# Patient Record
Sex: MALE | Race: WHITE | NOT HISPANIC OR LATINO | Employment: OTHER | URBAN - METROPOLITAN AREA
[De-identification: names, ages, dates, MRNs, and addresses within clinical notes are randomized per-mention and may not be internally consistent; named-entity substitution may affect disease eponyms.]

---

## 2024-01-01 ENCOUNTER — HOSPITAL ENCOUNTER (INPATIENT)
Dept: OTHER | Facility: HOSPITAL | Age: 79
Discharge: HOME | DRG: 871 | End: 2024-10-28
Payer: MEDICARE

## 2024-01-01 ENCOUNTER — APPOINTMENT (OUTPATIENT)
Dept: GENERAL RADIOLOGY | Facility: HOSPITAL | Age: 79
DRG: 871 | End: 2024-01-01
Payer: MEDICARE

## 2024-01-01 ENCOUNTER — APPOINTMENT (OUTPATIENT)
Dept: CARDIOLOGY | Facility: HOSPITAL | Age: 79
DRG: 871 | End: 2024-01-01
Payer: MEDICARE

## 2024-01-01 ENCOUNTER — APPOINTMENT (OUTPATIENT)
Dept: NEPHROLOGY | Facility: HOSPITAL | Age: 79
DRG: 871 | End: 2024-01-01
Payer: MEDICARE

## 2024-01-01 ENCOUNTER — HOSPITAL ENCOUNTER (INPATIENT)
Facility: HOSPITAL | Age: 79
LOS: 7 days | DRG: 871 | End: 2024-11-04
Attending: STUDENT IN AN ORGANIZED HEALTH CARE EDUCATION/TRAINING PROGRAM | Admitting: INTERNAL MEDICINE
Payer: MEDICARE

## 2024-01-01 VITALS
HEIGHT: 74 IN | WEIGHT: 246.91 LBS | BODY MASS INDEX: 31.69 KG/M2 | RESPIRATION RATE: 24 BRPM | TEMPERATURE: 99.5 F | OXYGEN SATURATION: 89 % | SYSTOLIC BLOOD PRESSURE: 78 MMHG | HEART RATE: 75 BPM | DIASTOLIC BLOOD PRESSURE: 40 MMHG

## 2024-01-01 DIAGNOSIS — R13.12 DYSPHAGIA, OROPHARYNGEAL: Primary | ICD-10-CM

## 2024-01-01 LAB
ALBUMIN SERPL-MCNC: 2.5 G/DL (ref 3.5–5.2)
ALBUMIN SERPL-MCNC: 2.5 G/DL (ref 3.5–5.2)
ALBUMIN SERPL-MCNC: 2.6 G/DL (ref 3.5–5.2)
ALBUMIN SERPL-MCNC: 2.7 G/DL (ref 3.5–5.2)
ALBUMIN SERPL-MCNC: 2.8 G/DL (ref 3.5–5.2)
ALBUMIN SERPL-MCNC: 3.1 G/DL (ref 3.5–5.2)
ALBUMIN/GLOB SERPL: 0.6 G/DL
ALBUMIN/GLOB SERPL: 0.7 G/DL
ALP SERPL-CCNC: 197 U/L (ref 39–117)
ALP SERPL-CCNC: 65 U/L (ref 39–117)
ALP SERPL-CCNC: 68 U/L (ref 39–117)
ALP SERPL-CCNC: 87 U/L (ref 39–117)
ALT SERPL W P-5'-P-CCNC: 10 U/L (ref 1–41)
ALT SERPL W P-5'-P-CCNC: 11 U/L (ref 1–41)
ALT SERPL W P-5'-P-CCNC: 12 U/L (ref 1–41)
ALT SERPL W P-5'-P-CCNC: 15 U/L (ref 1–41)
AMMONIA BLD-SCNC: 20 UMOL/L (ref 16–60)
ANION GAP SERPL CALCULATED.3IONS-SCNC: 13.9 MMOL/L (ref 5–15)
ANION GAP SERPL CALCULATED.3IONS-SCNC: 14.3 MMOL/L (ref 5–15)
ANION GAP SERPL CALCULATED.3IONS-SCNC: 15.6 MMOL/L (ref 5–15)
ANION GAP SERPL CALCULATED.3IONS-SCNC: 18.9 MMOL/L (ref 5–15)
ANION GAP SERPL CALCULATED.3IONS-SCNC: 19 MMOL/L (ref 5–15)
ANION GAP SERPL CALCULATED.3IONS-SCNC: 19.2 MMOL/L (ref 5–15)
ANION GAP SERPL CALCULATED.3IONS-SCNC: 19.5 MMOL/L (ref 5–15)
ANION GAP SERPL CALCULATED.3IONS-SCNC: 19.8 MMOL/L (ref 5–15)
ANION GAP SERPL CALCULATED.3IONS-SCNC: 25.4 MMOL/L (ref 5–15)
ANION GAP SERPL CALCULATED.3IONS-SCNC: 25.4 MMOL/L (ref 5–15)
ARTERIAL PATENCY WRIST A: ABNORMAL
ARTERIAL PATENCY WRIST A: POSITIVE
AST SERPL-CCNC: 22 U/L (ref 1–40)
AST SERPL-CCNC: 25 U/L (ref 1–40)
AST SERPL-CCNC: 28 U/L (ref 1–40)
AST SERPL-CCNC: 28 U/L (ref 1–40)
ATMOSPHERIC PRESS: 745.8 MMHG
ATMOSPHERIC PRESS: 748.2 MMHG
ATMOSPHERIC PRESS: 748.3 MMHG
ATMOSPHERIC PRESS: 753.1 MMHG
B BURGDOR IGG+IGM SER QL IA: NEGATIVE
B PARAPERT DNA SPEC QL NAA+PROBE: NOT DETECTED
B PERT DNA SPEC QL NAA+PROBE: NOT DETECTED
BACTERIA SPEC AEROBE CULT: NORMAL
BACTERIA SPEC AEROBE CULT: NORMAL
BACTERIA SPEC RESP CULT: ABNORMAL
BACTERIA SPEC RESP CULT: ABNORMAL
BASE EXCESS BLDA CALC-SCNC: -0.7 MMOL/L (ref -2–2)
BASE EXCESS BLDA CALC-SCNC: -2.2 MMOL/L (ref -2–2)
BASE EXCESS BLDA CALC-SCNC: -2.2 MMOL/L (ref -2–2)
BASE EXCESS BLDA CALC-SCNC: -3.7 MMOL/L (ref -2–2)
BASOPHILS # BLD AUTO: 0.05 10*3/MM3 (ref 0–0.2)
BASOPHILS # BLD AUTO: 0.05 10*3/MM3 (ref 0–0.2)
BASOPHILS NFR BLD AUTO: 0.4 % (ref 0–1.5)
BASOPHILS NFR BLD AUTO: 0.4 % (ref 0–1.5)
BDY SITE: ABNORMAL
BH CV ECHO MEAS - EDV(MOD-SP4): 64.9 ML
BH CV ECHO MEAS - EF(MOD-SP4): 61 %
BH CV ECHO MEAS - ESV(MOD-SP4): 25.3 ML
BH CV ECHO MEAS - IVS/LVPW: 0.93 CM
BH CV ECHO MEAS - IVSD: 1.3 CM
BH CV ECHO MEAS - LA DIMENSION: 4.6 CM
BH CV ECHO MEAS - LVIDD: 1.3 CM
BH CV ECHO MEAS - LVIDS: 1.1 CM
BH CV ECHO MEAS - LVOT DIAM: 2.1 CM
BH CV ECHO MEAS - LVPWD: 1.4 CM
BH CV ECHO MEAS - SV(MOD-SP4): 39.6 ML
BH CV ECHO MEAS - TR MAX PG: 20.4 MMHG
BH CV ECHO MEAS - TR MAX VEL: 225.8 CM/SEC
BH CV XLRA - TDI S': 15.9 CM/SEC
BILIRUB SERPL-MCNC: 1.4 MG/DL (ref 0–1.2)
BILIRUB SERPL-MCNC: 1.7 MG/DL (ref 0–1.2)
BILIRUB SERPL-MCNC: 1.8 MG/DL (ref 0–1.2)
BILIRUB SERPL-MCNC: 2.1 MG/DL (ref 0–1.2)
BUN SERPL-MCNC: 36 MG/DL (ref 8–23)
BUN SERPL-MCNC: 36 MG/DL (ref 8–23)
BUN SERPL-MCNC: 39 MG/DL (ref 8–23)
BUN SERPL-MCNC: 51 MG/DL (ref 8–23)
BUN SERPL-MCNC: 51 MG/DL (ref 8–23)
BUN SERPL-MCNC: 53 MG/DL (ref 8–23)
BUN SERPL-MCNC: 53 MG/DL (ref 8–23)
BUN SERPL-MCNC: 57 MG/DL (ref 8–23)
BUN SERPL-MCNC: 59 MG/DL (ref 8–23)
BUN SERPL-MCNC: 61 MG/DL (ref 8–23)
BUN/CREAT SERPL: 5.5 (ref 7–25)
BUN/CREAT SERPL: 5.9 (ref 7–25)
BUN/CREAT SERPL: 6 (ref 7–25)
BUN/CREAT SERPL: 6.1 (ref 7–25)
BUN/CREAT SERPL: 6.8 (ref 7–25)
BUN/CREAT SERPL: 6.8 (ref 7–25)
BUN/CREAT SERPL: 6.9 (ref 7–25)
BUN/CREAT SERPL: 7.8 (ref 7–25)
BUN/CREAT SERPL: 8.3 (ref 7–25)
BUN/CREAT SERPL: 9.6 (ref 7–25)
C PNEUM DNA NPH QL NAA+NON-PROBE: NOT DETECTED
CA-I BLDA-SCNC: 1.01 MMOL/L (ref 1.13–1.32)
CA-I BLDA-SCNC: 1.13 MMOL/L (ref 1.13–1.32)
CALCIUM SPEC-SCNC: 8 MG/DL (ref 8.6–10.5)
CALCIUM SPEC-SCNC: 8.4 MG/DL (ref 8.6–10.5)
CALCIUM SPEC-SCNC: 8.5 MG/DL (ref 8.6–10.5)
CALCIUM SPEC-SCNC: 8.5 MG/DL (ref 8.6–10.5)
CALCIUM SPEC-SCNC: 8.7 MG/DL (ref 8.6–10.5)
CALCIUM SPEC-SCNC: 8.7 MG/DL (ref 8.6–10.5)
CALCIUM SPEC-SCNC: 8.8 MG/DL (ref 8.6–10.5)
CALCIUM SPEC-SCNC: 9 MG/DL (ref 8.6–10.5)
CALCIUM SPEC-SCNC: 9.1 MG/DL (ref 8.6–10.5)
CALCIUM SPEC-SCNC: 9.1 MG/DL (ref 8.6–10.5)
CHLORIDE BLDA-SCNC: 101 MMOL/L (ref 98–107)
CHLORIDE BLDA-SCNC: 98 MMOL/L (ref 98–107)
CHLORIDE SERPL-SCNC: 91 MMOL/L (ref 98–107)
CHLORIDE SERPL-SCNC: 93 MMOL/L (ref 98–107)
CHLORIDE SERPL-SCNC: 93 MMOL/L (ref 98–107)
CHLORIDE SERPL-SCNC: 94 MMOL/L (ref 98–107)
CHLORIDE SERPL-SCNC: 94 MMOL/L (ref 98–107)
CHLORIDE SERPL-SCNC: 96 MMOL/L (ref 98–107)
CHLORIDE SERPL-SCNC: 97 MMOL/L (ref 98–107)
CHLORIDE SERPL-SCNC: 99 MMOL/L (ref 98–107)
CO2 SERPL-SCNC: 11.6 MMOL/L (ref 22–29)
CO2 SERPL-SCNC: 11.6 MMOL/L (ref 22–29)
CO2 SERPL-SCNC: 18.4 MMOL/L (ref 22–29)
CO2 SERPL-SCNC: 19.5 MMOL/L (ref 22–29)
CO2 SERPL-SCNC: 20 MMOL/L (ref 22–29)
CO2 SERPL-SCNC: 20.1 MMOL/L (ref 22–29)
CO2 SERPL-SCNC: 20.2 MMOL/L (ref 22–29)
CO2 SERPL-SCNC: 20.8 MMOL/L (ref 22–29)
CO2 SERPL-SCNC: 22.1 MMOL/L (ref 22–29)
CO2 SERPL-SCNC: 22.7 MMOL/L (ref 22–29)
CORTIS SERPL-MCNC: 39.74 MCG/DL
CREAT SERPL-MCNC: 10.09 MG/DL (ref 0.76–1.27)
CREAT SERPL-MCNC: 4.71 MG/DL (ref 0.76–1.27)
CREAT SERPL-MCNC: 5.25 MG/DL (ref 0.76–1.27)
CREAT SERPL-MCNC: 5.91 MG/DL (ref 0.76–1.27)
CREAT SERPL-MCNC: 6.52 MG/DL (ref 0.76–1.27)
CREAT SERPL-MCNC: 6.53 MG/DL (ref 0.76–1.27)
CREAT SERPL-MCNC: 7.76 MG/DL (ref 0.76–1.27)
CREAT SERPL-MCNC: 7.76 MG/DL (ref 0.76–1.27)
CREAT SERPL-MCNC: 8.7 MG/DL (ref 0.76–1.27)
CREAT SERPL-MCNC: 9.74 MG/DL (ref 0.76–1.27)
D-LACTATE SERPL-SCNC: 1.1 MMOL/L
D-LACTATE SERPL-SCNC: 1.3 MMOL/L
D-LACTATE SERPL-SCNC: 1.4 MMOL/L (ref 0.5–2)
D-LACTATE SERPL-SCNC: 2.1 MMOL/L (ref 0.5–2)
DEPRECATED RDW RBC AUTO: 53.5 FL (ref 37–54)
DEPRECATED RDW RBC AUTO: 54 FL (ref 37–54)
DEPRECATED RDW RBC AUTO: 54 FL (ref 37–54)
DEPRECATED RDW RBC AUTO: 54.2 FL (ref 37–54)
DEPRECATED RDW RBC AUTO: 54.7 FL (ref 37–54)
DEPRECATED RDW RBC AUTO: 58.1 FL (ref 37–54)
DEPRECATED RDW RBC AUTO: 59.1 FL (ref 37–54)
DEPRECATED RDW RBC AUTO: 61.4 FL (ref 37–54)
EGFRCR SERPLBLD CKD-EPI 2021: 10.7 ML/MIN/1.73
EGFRCR SERPLBLD CKD-EPI 2021: 12.1 ML/MIN/1.73
EGFRCR SERPLBLD CKD-EPI 2021: 4.9 ML/MIN/1.73
EGFRCR SERPLBLD CKD-EPI 2021: 5.1 ML/MIN/1.73
EGFRCR SERPLBLD CKD-EPI 2021: 5.8 ML/MIN/1.73
EGFRCR SERPLBLD CKD-EPI 2021: 6.7 ML/MIN/1.73
EGFRCR SERPLBLD CKD-EPI 2021: 6.7 ML/MIN/1.73
EGFRCR SERPLBLD CKD-EPI 2021: 8.2 ML/MIN/1.73
EGFRCR SERPLBLD CKD-EPI 2021: 8.2 ML/MIN/1.73
EGFRCR SERPLBLD CKD-EPI 2021: 9.2 ML/MIN/1.73
EOSINOPHIL # BLD AUTO: 0.07 10*3/MM3 (ref 0–0.4)
EOSINOPHIL # BLD AUTO: 0.09 10*3/MM3 (ref 0–0.4)
EOSINOPHIL NFR BLD AUTO: 0.5 % (ref 0.3–6.2)
EOSINOPHIL NFR BLD AUTO: 0.6 % (ref 0.3–6.2)
ERYTHROCYTE [DISTWIDTH] IN BLOOD BY AUTOMATED COUNT: 16 % (ref 12.3–15.4)
ERYTHROCYTE [DISTWIDTH] IN BLOOD BY AUTOMATED COUNT: 16.1 % (ref 12.3–15.4)
ERYTHROCYTE [DISTWIDTH] IN BLOOD BY AUTOMATED COUNT: 16.1 % (ref 12.3–15.4)
ERYTHROCYTE [DISTWIDTH] IN BLOOD BY AUTOMATED COUNT: 16.7 % (ref 12.3–15.4)
ERYTHROCYTE [DISTWIDTH] IN BLOOD BY AUTOMATED COUNT: 16.8 % (ref 12.3–15.4)
ERYTHROCYTE [DISTWIDTH] IN BLOOD BY AUTOMATED COUNT: 17.1 % (ref 12.3–15.4)
ERYTHROCYTE [DISTWIDTH] IN BLOOD BY AUTOMATED COUNT: 17.8 % (ref 12.3–15.4)
ERYTHROCYTE [DISTWIDTH] IN BLOOD BY AUTOMATED COUNT: 18.2 % (ref 12.3–15.4)
FLUAV SUBTYP SPEC NAA+PROBE: NOT DETECTED
FLUBV RNA ISLT QL NAA+PROBE: NOT DETECTED
FOLATE SERPL-MCNC: 4.88 NG/ML (ref 4.78–24.2)
GAS FLOW AIRWAY: 2 LPM
GAS FLOW AIRWAY: 3 LPM
GEN 5 2HR TROPONIN T REFLEX: 117 NG/L
GLOBULIN UR ELPH-MCNC: 3.7 GM/DL
GLOBULIN UR ELPH-MCNC: 3.9 GM/DL
GLOBULIN UR ELPH-MCNC: 4.3 GM/DL
GLOBULIN UR ELPH-MCNC: 4.4 GM/DL
GLUCOSE BLDC GLUCOMTR-MCNC: 112 MG/DL (ref 70–99)
GLUCOSE BLDC GLUCOMTR-MCNC: 117 MG/DL (ref 70–99)
GLUCOSE BLDC GLUCOMTR-MCNC: 131 MG/DL (ref 70–99)
GLUCOSE BLDC GLUCOMTR-MCNC: 140 MG/DL (ref 70–99)
GLUCOSE BLDC GLUCOMTR-MCNC: 142 MG/DL (ref 70–99)
GLUCOSE BLDC GLUCOMTR-MCNC: 143 MG/DL (ref 70–99)
GLUCOSE BLDC GLUCOMTR-MCNC: 147 MG/DL (ref 70–99)
GLUCOSE BLDC GLUCOMTR-MCNC: 153 MG/DL (ref 70–99)
GLUCOSE BLDC GLUCOMTR-MCNC: 154 MG/DL (ref 70–99)
GLUCOSE BLDC GLUCOMTR-MCNC: 155 MG/DL (ref 70–99)
GLUCOSE BLDC GLUCOMTR-MCNC: 157 MG/DL (ref 70–99)
GLUCOSE BLDC GLUCOMTR-MCNC: 164 MG/DL (ref 70–99)
GLUCOSE BLDC GLUCOMTR-MCNC: 165 MG/DL (ref 70–99)
GLUCOSE BLDC GLUCOMTR-MCNC: 167 MG/DL (ref 70–99)
GLUCOSE BLDC GLUCOMTR-MCNC: 170 MG/DL (ref 70–99)
GLUCOSE BLDC GLUCOMTR-MCNC: 172 MG/DL (ref 70–99)
GLUCOSE BLDC GLUCOMTR-MCNC: 178 MG/DL (ref 70–99)
GLUCOSE BLDC GLUCOMTR-MCNC: 185 MG/DL (ref 70–99)
GLUCOSE BLDC GLUCOMTR-MCNC: 185 MG/DL (ref 70–99)
GLUCOSE BLDC GLUCOMTR-MCNC: 189 MG/DL (ref 70–99)
GLUCOSE BLDC GLUCOMTR-MCNC: 189 MG/DL (ref 70–99)
GLUCOSE BLDC GLUCOMTR-MCNC: 201 MG/DL (ref 70–99)
GLUCOSE BLDC GLUCOMTR-MCNC: 214 MG/DL (ref 70–99)
GLUCOSE BLDC GLUCOMTR-MCNC: 220 MG/DL (ref 70–99)
GLUCOSE BLDC GLUCOMTR-MCNC: 234 MG/DL (ref 70–99)
GLUCOSE BLDC GLUCOMTR-MCNC: 236 MG/DL (ref 70–99)
GLUCOSE BLDC GLUCOMTR-MCNC: 243 MG/DL (ref 70–99)
GLUCOSE BLDC GLUCOMTR-MCNC: 246 MG/DL (ref 70–99)
GLUCOSE BLDC GLUCOMTR-MCNC: 251 MG/DL (ref 70–99)
GLUCOSE BLDC GLUCOMTR-MCNC: 256 MG/DL (ref 70–99)
GLUCOSE BLDC GLUCOMTR-MCNC: 277 MG/DL (ref 70–99)
GLUCOSE BLDC GLUCOMTR-MCNC: 282 MG/DL (ref 70–99)
GLUCOSE BLDC GLUCOMTR-MCNC: 288 MG/DL (ref 70–99)
GLUCOSE BLDC GLUCOMTR-MCNC: NORMAL MG/DL
GLUCOSE SERPL-MCNC: 125 MG/DL (ref 65–99)
GLUCOSE SERPL-MCNC: 146 MG/DL (ref 65–99)
GLUCOSE SERPL-MCNC: 171 MG/DL (ref 65–99)
GLUCOSE SERPL-MCNC: 178 MG/DL (ref 65–99)
GLUCOSE SERPL-MCNC: 178 MG/DL (ref 65–99)
GLUCOSE SERPL-MCNC: 184 MG/DL (ref 65–99)
GLUCOSE SERPL-MCNC: 197 MG/DL (ref 65–99)
GLUCOSE SERPL-MCNC: 214 MG/DL (ref 65–99)
GLUCOSE SERPL-MCNC: 267 MG/DL (ref 65–99)
GLUCOSE SERPL-MCNC: 329 MG/DL (ref 65–99)
GRAM STN SPEC: ABNORMAL
GRAM STN SPEC: ABNORMAL
HADV DNA SPEC NAA+PROBE: NOT DETECTED
HBV CORE AB SERPL QL IA: NEGATIVE
HBV SURFACE AB SER RIA-ACNC: NORMAL
HBV SURFACE AG SERPL QL IA: NORMAL
HCO3 BLDA-SCNC: 23 MMOL/L (ref 22–26)
HCO3 BLDA-SCNC: 23.4 MMOL/L (ref 22–26)
HCO3 BLDA-SCNC: 25.1 MMOL/L (ref 22–26)
HCO3 BLDA-SCNC: 26.1 MMOL/L (ref 22–26)
HCOV 229E RNA SPEC QL NAA+PROBE: NOT DETECTED
HCOV HKU1 RNA SPEC QL NAA+PROBE: NOT DETECTED
HCOV NL63 RNA SPEC QL NAA+PROBE: NOT DETECTED
HCOV OC43 RNA SPEC QL NAA+PROBE: NOT DETECTED
HCT VFR BLD AUTO: 32.5 % (ref 37.5–51)
HCT VFR BLD AUTO: 33.2 % (ref 37.5–51)
HCT VFR BLD AUTO: 33.8 % (ref 37.5–51)
HCT VFR BLD AUTO: 34.3 % (ref 37.5–51)
HCT VFR BLD AUTO: 34.5 % (ref 37.5–51)
HCT VFR BLD AUTO: 34.9 % (ref 37.5–51)
HCT VFR BLD AUTO: 38.3 % (ref 37.5–51)
HCT VFR BLD AUTO: 39.8 % (ref 37.5–51)
HCT VFR BLD CALC: 38 % (ref 38–51)
HCT VFR BLD CALC: 39 % (ref 38–51)
HCT VFR BLD CALC: 39 % (ref 38–51)
HCT VFR BLD CALC: 40 % (ref 38–51)
HEMODILUTION: NO
HGB BLD-MCNC: 10.3 G/DL (ref 13–17.7)
HGB BLD-MCNC: 10.5 G/DL (ref 13–17.7)
HGB BLD-MCNC: 10.6 G/DL (ref 13–17.7)
HGB BLD-MCNC: 10.6 G/DL (ref 13–17.7)
HGB BLD-MCNC: 10.9 G/DL (ref 13–17.7)
HGB BLD-MCNC: 11 G/DL (ref 13–17.7)
HGB BLD-MCNC: 11.8 G/DL (ref 13–17.7)
HGB BLD-MCNC: 12.4 G/DL (ref 13–17.7)
HGB BLDA-MCNC: 12.8 G/DL (ref 12–18)
HGB BLDA-MCNC: 13.2 G/DL (ref 12–18)
HGB BLDA-MCNC: 13.3 G/DL (ref 12–18)
HGB BLDA-MCNC: 13.6 G/DL (ref 12–18)
HMPV RNA NPH QL NAA+NON-PROBE: NOT DETECTED
HOLD SPECIMEN: NORMAL
HPIV1 RNA ISLT QL NAA+PROBE: NOT DETECTED
HPIV2 RNA SPEC QL NAA+PROBE: NOT DETECTED
HPIV3 RNA NPH QL NAA+PROBE: NOT DETECTED
HPIV4 P GENE NPH QL NAA+PROBE: NOT DETECTED
IMM GRANULOCYTES # BLD AUTO: 0.1 10*3/MM3 (ref 0–0.05)
IMM GRANULOCYTES # BLD AUTO: 0.14 10*3/MM3 (ref 0–0.05)
IMM GRANULOCYTES NFR BLD AUTO: 0.8 % (ref 0–0.5)
IMM GRANULOCYTES NFR BLD AUTO: 1 % (ref 0–0.5)
INHALED O2 CONCENTRATION: 28 %
INHALED O2 CONCENTRATION: 28 %
INHALED O2 CONCENTRATION: 30 %
INHALED O2 CONCENTRATION: 32 %
INR PPP: 1.44 (ref 0.86–1.15)
INR PPP: 1.47 (ref 0.86–1.15)
INR PPP: 1.48 (ref 0.86–1.15)
LYMPHOCYTES # BLD AUTO: 2.05 10*3/MM3 (ref 0.7–3.1)
LYMPHOCYTES # BLD AUTO: 2.21 10*3/MM3 (ref 0.7–3.1)
LYMPHOCYTES NFR BLD AUTO: 14.4 % (ref 19.6–45.3)
LYMPHOCYTES NFR BLD AUTO: 17.3 % (ref 19.6–45.3)
Lab: ABNORMAL
M PNEUMO IGG SER IA-ACNC: NOT DETECTED
MAGNESIUM SERPL-MCNC: 1.9 MG/DL (ref 1.6–2.4)
MAGNESIUM SERPL-MCNC: 1.9 MG/DL (ref 1.6–2.4)
MAGNESIUM SERPL-MCNC: 2 MG/DL (ref 1.6–2.4)
MAGNESIUM SERPL-MCNC: 2.2 MG/DL (ref 1.6–2.4)
MAGNESIUM SERPL-MCNC: 2.2 MG/DL (ref 1.6–2.4)
MCH RBC QN AUTO: 29.1 PG (ref 26.6–33)
MCH RBC QN AUTO: 29.2 PG (ref 26.6–33)
MCH RBC QN AUTO: 29.3 PG (ref 26.6–33)
MCH RBC QN AUTO: 29.6 PG (ref 26.6–33)
MCH RBC QN AUTO: 29.9 PG (ref 26.6–33)
MCH RBC QN AUTO: 29.9 PG (ref 26.6–33)
MCH RBC QN AUTO: 30 PG (ref 26.6–33)
MCH RBC QN AUTO: 30.2 PG (ref 26.6–33)
MCHC RBC AUTO-ENTMCNC: 30 G/DL (ref 31.5–35.7)
MCHC RBC AUTO-ENTMCNC: 30.8 G/DL (ref 31.5–35.7)
MCHC RBC AUTO-ENTMCNC: 31.1 G/DL (ref 31.5–35.7)
MCHC RBC AUTO-ENTMCNC: 31.2 G/DL (ref 31.5–35.7)
MCHC RBC AUTO-ENTMCNC: 31.2 G/DL (ref 31.5–35.7)
MCHC RBC AUTO-ENTMCNC: 31.9 G/DL (ref 31.5–35.7)
MCHC RBC AUTO-ENTMCNC: 31.9 G/DL (ref 31.5–35.7)
MCHC RBC AUTO-ENTMCNC: 32.6 G/DL (ref 31.5–35.7)
MCV RBC AUTO: 92.1 FL (ref 79–97)
MCV RBC AUTO: 92.7 FL (ref 79–97)
MCV RBC AUTO: 93.3 FL (ref 79–97)
MCV RBC AUTO: 93.8 FL (ref 79–97)
MCV RBC AUTO: 94.2 FL (ref 79–97)
MCV RBC AUTO: 95.9 FL (ref 79–97)
MCV RBC AUTO: 97.7 FL (ref 79–97)
MCV RBC AUTO: 98 FL (ref 79–97)
MODALITY: ABNORMAL
MONOCYTES # BLD AUTO: 1.5 10*3/MM3 (ref 0.1–0.9)
MONOCYTES # BLD AUTO: 1.66 10*3/MM3 (ref 0.1–0.9)
MONOCYTES NFR BLD AUTO: 11.6 % (ref 5–12)
MONOCYTES NFR BLD AUTO: 11.8 % (ref 5–12)
MRSA DNA SPEC QL NAA+PROBE: NORMAL
NEUTROPHILS NFR BLD AUTO: 10.28 10*3/MM3 (ref 1.7–7)
NEUTROPHILS NFR BLD AUTO: 69.2 % (ref 42.7–76)
NEUTROPHILS NFR BLD AUTO: 72 % (ref 42.7–76)
NEUTROPHILS NFR BLD AUTO: 8.82 10*3/MM3 (ref 1.7–7)
NOTIFIED WHO: ABNORMAL
NRBC BLD AUTO-RTO: 0.5 /100 WBC (ref 0–0.2)
NRBC BLD AUTO-RTO: 0.7 /100 WBC (ref 0–0.2)
NT-PROBNP SERPL-MCNC: ABNORMAL PG/ML (ref 0–1800)
NT-PROBNP SERPL-MCNC: ABNORMAL PG/ML (ref 0–1800)
PCO2 BLDA: 42.5 MM HG (ref 35–45)
PCO2 BLDA: 47.5 MM HG (ref 35–45)
PCO2 BLDA: 50.6 MM HG (ref 35–45)
PCO2 BLDA: 53.2 MM HG (ref 35–45)
PEEP RESPIRATORY: 5 CM[H2O]
PH BLDA: 7.28 PH UNITS (ref 7.35–7.45)
PH BLDA: 7.29 PH UNITS (ref 7.35–7.45)
PH BLDA: 7.32 PH UNITS (ref 7.35–7.45)
PH BLDA: 7.35 PH UNITS (ref 7.35–7.45)
PHOSPHATE SERPL-MCNC: 3.1 MG/DL (ref 2.5–4.5)
PHOSPHATE SERPL-MCNC: 3.2 MG/DL (ref 2.5–4.5)
PHOSPHATE SERPL-MCNC: 4.9 MG/DL (ref 2.5–4.5)
PHOSPHATE SERPL-MCNC: 5 MG/DL (ref 2.5–4.5)
PHOSPHATE SERPL-MCNC: 6.3 MG/DL (ref 2.5–4.5)
PHOSPHATE SERPL-MCNC: 8.2 MG/DL (ref 2.5–4.5)
PLATELET # BLD AUTO: 185 10*3/MM3 (ref 140–450)
PLATELET # BLD AUTO: 235 10*3/MM3 (ref 140–450)
PLATELET # BLD AUTO: 242 10*3/MM3 (ref 140–450)
PLATELET # BLD AUTO: 254 10*3/MM3 (ref 140–450)
PLATELET # BLD AUTO: 271 10*3/MM3 (ref 140–450)
PLATELET # BLD AUTO: 292 10*3/MM3 (ref 140–450)
PLATELET # BLD AUTO: 298 10*3/MM3 (ref 140–450)
PLATELET # BLD AUTO: 309 10*3/MM3 (ref 140–450)
PMV BLD AUTO: 11.1 FL (ref 6–12)
PMV BLD AUTO: 11.2 FL (ref 6–12)
PMV BLD AUTO: 11.3 FL (ref 6–12)
PMV BLD AUTO: 11.3 FL (ref 6–12)
PMV BLD AUTO: 11.4 FL (ref 6–12)
PMV BLD AUTO: 11.6 FL (ref 6–12)
PMV BLD AUTO: 11.9 FL (ref 6–12)
PMV BLD AUTO: 12.4 FL (ref 6–12)
PO2 BLD: 287 MM[HG] (ref 0–500)
PO2 BLD: 288 MM[HG] (ref 0–500)
PO2 BLD: 300 MM[HG] (ref 0–500)
PO2 BLD: 305 MM[HG] (ref 0–500)
PO2 BLDA: 80.6 MM HG (ref 80–100)
PO2 BLDA: 85.3 MM HG (ref 80–100)
PO2 BLDA: 86.2 MM HG (ref 80–100)
PO2 BLDA: 96.1 MM HG (ref 80–100)
POTASSIUM BLDA-SCNC: 4.1 MMOL/L (ref 3.5–5)
POTASSIUM BLDA-SCNC: 4.2 MMOL/L (ref 3.5–5)
POTASSIUM SERPL-SCNC: 3.8 MMOL/L (ref 3.5–5.2)
POTASSIUM SERPL-SCNC: 3.8 MMOL/L (ref 3.5–5.2)
POTASSIUM SERPL-SCNC: 4.1 MMOL/L (ref 3.5–5.2)
POTASSIUM SERPL-SCNC: 4.1 MMOL/L (ref 3.5–5.2)
POTASSIUM SERPL-SCNC: 4.2 MMOL/L (ref 3.5–5.2)
POTASSIUM SERPL-SCNC: 4.5 MMOL/L (ref 3.5–5.2)
POTASSIUM SERPL-SCNC: 4.5 MMOL/L (ref 3.5–5.2)
POTASSIUM SERPL-SCNC: 4.6 MMOL/L (ref 3.5–5.2)
POTASSIUM SERPL-SCNC: 5.6 MMOL/L (ref 3.5–5.2)
POTASSIUM SERPL-SCNC: 5.6 MMOL/L (ref 3.5–5.2)
PROCALCITONIN SERPL-MCNC: 0.9 NG/ML (ref 0–0.25)
PROCALCITONIN SERPL-MCNC: 1.96 NG/ML (ref 0–0.25)
PROT SERPL-MCNC: 6.3 G/DL (ref 6–8.5)
PROT SERPL-MCNC: 6.6 G/DL (ref 6–8.5)
PROT SERPL-MCNC: 6.9 G/DL (ref 6–8.5)
PROT SERPL-MCNC: 7.1 G/DL (ref 6–8.5)
PROTHROMBIN TIME: 17.8 SECONDS (ref 11.8–14.9)
PROTHROMBIN TIME: 18.1 SECONDS (ref 11.8–14.9)
PROTHROMBIN TIME: 18.2 SECONDS (ref 11.8–14.9)
PSV: 12 CMH2O
QT INTERVAL: 445 MS
QT INTERVAL: 500 MS
QTC INTERVAL: 499 MS
QTC INTERVAL: 586 MS
RBC # BLD AUTO: 3.51 10*6/MM3 (ref 4.14–5.8)
RBC # BLD AUTO: 3.53 10*6/MM3 (ref 4.14–5.8)
RBC # BLD AUTO: 3.58 10*6/MM3 (ref 4.14–5.8)
RBC # BLD AUTO: 3.59 10*6/MM3 (ref 4.14–5.8)
RBC # BLD AUTO: 3.68 10*6/MM3 (ref 4.14–5.8)
RBC # BLD AUTO: 3.74 10*6/MM3 (ref 4.14–5.8)
RBC # BLD AUTO: 3.91 10*6/MM3 (ref 4.14–5.8)
RBC # BLD AUTO: 4.15 10*6/MM3 (ref 4.14–5.8)
READ BACK: YES
RESPIRATORY RATE: 14
RHINOVIRUS RNA SPEC NAA+PROBE: NOT DETECTED
RSV RNA NPH QL NAA+NON-PROBE: NOT DETECTED
SAO2 % BLDCOA: 94.6 % (ref 95–99)
SAO2 % BLDCOA: 94.9 % (ref 95–99)
SAO2 % BLDCOA: 96 % (ref 95–99)
SAO2 % BLDCOA: 96.5 % (ref 95–99)
SARS-COV-2 RNA RESP QL NAA+PROBE: NOT DETECTED
SINUS: 2.6 CM
SODIUM BLD-SCNC: 133 MMOL/L (ref 131–143)
SODIUM BLD-SCNC: 136 MMOL/L (ref 131–143)
SODIUM SERPL-SCNC: 128 MMOL/L (ref 136–145)
SODIUM SERPL-SCNC: 129 MMOL/L (ref 136–145)
SODIUM SERPL-SCNC: 131 MMOL/L (ref 136–145)
SODIUM SERPL-SCNC: 131 MMOL/L (ref 136–145)
SODIUM SERPL-SCNC: 133 MMOL/L (ref 136–145)
SODIUM SERPL-SCNC: 135 MMOL/L (ref 136–145)
SODIUM SERPL-SCNC: 136 MMOL/L (ref 136–145)
SODIUM SERPL-SCNC: 138 MMOL/L (ref 136–145)
T-UPTAKE NFR SERPL: 0.92 TBI (ref 0.8–1.3)
T4 SERPL-MCNC: 4.38 MCG/DL (ref 4.5–11.7)
TROPONIN T DELTA: -2 NG/L
TROPONIN T SERPL HS-MCNC: 119 NG/L
TSH SERPL DL<=0.05 MIU/L-ACNC: 2.32 UIU/ML (ref 0.27–4.2)
VANCOMYCIN SERPL-MCNC: 15.1 MCG/ML (ref 5–40)
VIT B12 BLD-MCNC: 647 PG/ML (ref 211–946)
WBC NRBC COR # BLD AUTO: 12.75 10*3/MM3 (ref 3.4–10.8)
WBC NRBC COR # BLD AUTO: 13.53 10*3/MM3 (ref 3.4–10.8)
WBC NRBC COR # BLD AUTO: 14.27 10*3/MM3 (ref 3.4–10.8)
WBC NRBC COR # BLD AUTO: 14.52 10*3/MM3 (ref 3.4–10.8)
WBC NRBC COR # BLD AUTO: 15.86 10*3/MM3 (ref 3.4–10.8)
WBC NRBC COR # BLD AUTO: 16.23 10*3/MM3 (ref 3.4–10.8)
WBC NRBC COR # BLD AUTO: 18.44 10*3/MM3 (ref 3.4–10.8)
WBC NRBC COR # BLD AUTO: 18.62 10*3/MM3 (ref 3.4–10.8)

## 2024-01-01 PROCEDURE — 93306 TTE W/DOPPLER COMPLETE: CPT

## 2024-01-01 PROCEDURE — 25010000002 LEVOFLOXACIN PER 250 MG

## 2024-01-01 PROCEDURE — 87340 HEPATITIS B SURFACE AG IA: CPT | Performed by: INTERNAL MEDICINE

## 2024-01-01 PROCEDURE — 84100 ASSAY OF PHOSPHORUS: CPT | Performed by: INTERNAL MEDICINE

## 2024-01-01 PROCEDURE — 25010000002 HEPARIN (PORCINE) PER 1000 UNITS: Performed by: INTERNAL MEDICINE

## 2024-01-01 PROCEDURE — 80053 COMPREHEN METABOLIC PANEL: CPT | Performed by: INTERNAL MEDICINE

## 2024-01-01 PROCEDURE — 84145 PROCALCITONIN (PCT): CPT | Performed by: NURSE PRACTITIONER

## 2024-01-01 PROCEDURE — 86704 HEP B CORE ANTIBODY TOTAL: CPT | Performed by: INTERNAL MEDICINE

## 2024-01-01 PROCEDURE — 82948 REAGENT STRIP/BLOOD GLUCOSE: CPT

## 2024-01-01 PROCEDURE — 80051 ELECTROLYTE PANEL: CPT

## 2024-01-01 PROCEDURE — 80048 BASIC METABOLIC PNL TOTAL CA: CPT | Performed by: INTERNAL MEDICINE

## 2024-01-01 PROCEDURE — 80202 ASSAY OF VANCOMYCIN: CPT | Performed by: STUDENT IN AN ORGANIZED HEALTH CARE EDUCATION/TRAINING PROGRAM

## 2024-01-01 PROCEDURE — 94668 MNPJ CHEST WALL SBSQ: CPT

## 2024-01-01 PROCEDURE — 84436 ASSAY OF TOTAL THYROXINE: CPT | Performed by: PHYSICIAN ASSISTANT

## 2024-01-01 PROCEDURE — 82948 REAGENT STRIP/BLOOD GLUCOSE: CPT | Performed by: STUDENT IN AN ORGANIZED HEALTH CARE EDUCATION/TRAINING PROGRAM

## 2024-01-01 PROCEDURE — 94799 UNLISTED PULMONARY SVC/PX: CPT

## 2024-01-01 PROCEDURE — 84479 ASSAY OF THYROID (T3 OR T4): CPT | Performed by: PHYSICIAN ASSISTANT

## 2024-01-01 PROCEDURE — 87484 EHRLICHA CHAFFEENSIS AMP PRB: CPT | Performed by: INTERNAL MEDICINE

## 2024-01-01 PROCEDURE — 94664 DEMO&/EVAL PT USE INHALER: CPT

## 2024-01-01 PROCEDURE — 87077 CULTURE AEROBIC IDENTIFY: CPT | Performed by: NURSE PRACTITIONER

## 2024-01-01 PROCEDURE — 99291 CRITICAL CARE FIRST HOUR: CPT | Performed by: INTERNAL MEDICINE

## 2024-01-01 PROCEDURE — 93005 ELECTROCARDIOGRAM TRACING: CPT | Performed by: STUDENT IN AN ORGANIZED HEALTH CARE EDUCATION/TRAINING PROGRAM

## 2024-01-01 PROCEDURE — 85027 COMPLETE CBC AUTOMATED: CPT | Performed by: INTERNAL MEDICINE

## 2024-01-01 PROCEDURE — 25010000002 CEFEPIME PER 500 MG: Performed by: STUDENT IN AN ORGANIZED HEALTH CARE EDUCATION/TRAINING PROGRAM

## 2024-01-01 PROCEDURE — 5A1D70Z PERFORMANCE OF URINARY FILTRATION, INTERMITTENT, LESS THAN 6 HOURS PER DAY: ICD-10-PCS | Performed by: INTERNAL MEDICINE

## 2024-01-01 PROCEDURE — 86706 HEP B SURFACE ANTIBODY: CPT | Performed by: INTERNAL MEDICINE

## 2024-01-01 PROCEDURE — 25010000002 ONDANSETRON PER 1 MG: Performed by: STUDENT IN AN ORGANIZED HEALTH CARE EDUCATION/TRAINING PROGRAM

## 2024-01-01 PROCEDURE — 71045 X-RAY EXAM CHEST 1 VIEW: CPT

## 2024-01-01 PROCEDURE — 82607 VITAMIN B-12: CPT | Performed by: PHYSICIAN ASSISTANT

## 2024-01-01 PROCEDURE — 25010000002 SULFUR HEXAFLUORIDE MICROSPH 60.7-25 MG RECONSTITUTED SUSPENSION: Performed by: INTERNAL MEDICINE

## 2024-01-01 PROCEDURE — 82803 BLOOD GASES ANY COMBINATION: CPT

## 2024-01-01 PROCEDURE — 99233 SBSQ HOSP IP/OBS HIGH 50: CPT | Performed by: STUDENT IN AN ORGANIZED HEALTH CARE EDUCATION/TRAINING PROGRAM

## 2024-01-01 PROCEDURE — 94660 CPAP INITIATION&MGMT: CPT

## 2024-01-01 PROCEDURE — 36600 WITHDRAWAL OF ARTERIAL BLOOD: CPT

## 2024-01-01 PROCEDURE — 99291 CRITICAL CARE FIRST HOUR: CPT | Performed by: STUDENT IN AN ORGANIZED HEALTH CARE EDUCATION/TRAINING PROGRAM

## 2024-01-01 PROCEDURE — 83735 ASSAY OF MAGNESIUM: CPT | Performed by: INTERNAL MEDICINE

## 2024-01-01 PROCEDURE — 82746 ASSAY OF FOLIC ACID SERUM: CPT | Performed by: PHYSICIAN ASSISTANT

## 2024-01-01 PROCEDURE — 84100 ASSAY OF PHOSPHORUS: CPT | Performed by: STUDENT IN AN ORGANIZED HEALTH CARE EDUCATION/TRAINING PROGRAM

## 2024-01-01 PROCEDURE — 85610 PROTHROMBIN TIME: CPT | Performed by: STUDENT IN AN ORGANIZED HEALTH CARE EDUCATION/TRAINING PROGRAM

## 2024-01-01 PROCEDURE — 99233 SBSQ HOSP IP/OBS HIGH 50: CPT | Performed by: INTERNAL MEDICINE

## 2024-01-01 PROCEDURE — 25010000002 VANCOMYCIN 5 G RECONSTITUTED SOLUTION: Performed by: STUDENT IN AN ORGANIZED HEALTH CARE EDUCATION/TRAINING PROGRAM

## 2024-01-01 PROCEDURE — 63710000001 INSULIN LISPRO (HUMAN) PER 5 UNITS: Performed by: STUDENT IN AN ORGANIZED HEALTH CARE EDUCATION/TRAINING PROGRAM

## 2024-01-01 PROCEDURE — 82330 ASSAY OF CALCIUM: CPT

## 2024-01-01 PROCEDURE — 94760 N-INVAS EAR/PLS OXIMETRY 1: CPT

## 2024-01-01 PROCEDURE — 87798 DETECT AGENT NOS DNA AMP: CPT | Performed by: INTERNAL MEDICINE

## 2024-01-01 PROCEDURE — 84484 ASSAY OF TROPONIN QUANT: CPT | Performed by: STUDENT IN AN ORGANIZED HEALTH CARE EDUCATION/TRAINING PROGRAM

## 2024-01-01 PROCEDURE — 82040 ASSAY OF SERUM ALBUMIN: CPT | Performed by: STUDENT IN AN ORGANIZED HEALTH CARE EDUCATION/TRAINING PROGRAM

## 2024-01-01 PROCEDURE — 83605 ASSAY OF LACTIC ACID: CPT

## 2024-01-01 PROCEDURE — 99222 1ST HOSP IP/OBS MODERATE 55: CPT | Performed by: STUDENT IN AN ORGANIZED HEALTH CARE EDUCATION/TRAINING PROGRAM

## 2024-01-01 PROCEDURE — 87040 BLOOD CULTURE FOR BACTERIA: CPT | Performed by: STUDENT IN AN ORGANIZED HEALTH CARE EDUCATION/TRAINING PROGRAM

## 2024-01-01 PROCEDURE — 93306 TTE W/DOPPLER COMPLETE: CPT | Performed by: STUDENT IN AN ORGANIZED HEALTH CARE EDUCATION/TRAINING PROGRAM

## 2024-01-01 PROCEDURE — 83880 ASSAY OF NATRIURETIC PEPTIDE: CPT | Performed by: INTERNAL MEDICINE

## 2024-01-01 PROCEDURE — 83880 ASSAY OF NATRIURETIC PEPTIDE: CPT | Performed by: NURSE PRACTITIONER

## 2024-01-01 PROCEDURE — 82140 ASSAY OF AMMONIA: CPT | Performed by: PHYSICIAN ASSISTANT

## 2024-01-01 PROCEDURE — 83735 ASSAY OF MAGNESIUM: CPT | Performed by: STUDENT IN AN ORGANIZED HEALTH CARE EDUCATION/TRAINING PROGRAM

## 2024-01-01 PROCEDURE — 80048 BASIC METABOLIC PNL TOTAL CA: CPT | Performed by: STUDENT IN AN ORGANIZED HEALTH CARE EDUCATION/TRAINING PROGRAM

## 2024-01-01 PROCEDURE — 84145 PROCALCITONIN (PCT): CPT | Performed by: INTERNAL MEDICINE

## 2024-01-01 PROCEDURE — 87040 BLOOD CULTURE FOR BACTERIA: CPT | Performed by: INTERNAL MEDICINE

## 2024-01-01 PROCEDURE — 83605 ASSAY OF LACTIC ACID: CPT | Performed by: STUDENT IN AN ORGANIZED HEALTH CARE EDUCATION/TRAINING PROGRAM

## 2024-01-01 PROCEDURE — 0202U NFCT DS 22 TRGT SARS-COV-2: CPT | Performed by: STUDENT IN AN ORGANIZED HEALTH CARE EDUCATION/TRAINING PROGRAM

## 2024-01-01 PROCEDURE — 94761 N-INVAS EAR/PLS OXIMETRY MLT: CPT

## 2024-01-01 PROCEDURE — 5A09357 ASSISTANCE WITH RESPIRATORY VENTILATION, LESS THAN 24 CONSECUTIVE HOURS, CONTINUOUS POSITIVE AIRWAY PRESSURE: ICD-10-PCS | Performed by: STUDENT IN AN ORGANIZED HEALTH CARE EDUCATION/TRAINING PROGRAM

## 2024-01-01 PROCEDURE — 93010 ELECTROCARDIOGRAM REPORT: CPT | Performed by: INTERNAL MEDICINE

## 2024-01-01 PROCEDURE — 25810000003 SODIUM CHLORIDE 0.9 % SOLUTION: Performed by: STUDENT IN AN ORGANIZED HEALTH CARE EDUCATION/TRAINING PROGRAM

## 2024-01-01 PROCEDURE — 87205 SMEAR GRAM STAIN: CPT | Performed by: NURSE PRACTITIONER

## 2024-01-01 PROCEDURE — 86618 LYME DISEASE ANTIBODY: CPT | Performed by: INTERNAL MEDICINE

## 2024-01-01 PROCEDURE — 85025 COMPLETE CBC W/AUTO DIFF WBC: CPT | Performed by: STUDENT IN AN ORGANIZED HEALTH CARE EDUCATION/TRAINING PROGRAM

## 2024-01-01 PROCEDURE — 94667 MNPJ CHEST WALL 1ST: CPT

## 2024-01-01 PROCEDURE — 99223 1ST HOSP IP/OBS HIGH 75: CPT | Performed by: STUDENT IN AN ORGANIZED HEALTH CARE EDUCATION/TRAINING PROGRAM

## 2024-01-01 PROCEDURE — 84443 ASSAY THYROID STIM HORMONE: CPT | Performed by: PHYSICIAN ASSISTANT

## 2024-01-01 PROCEDURE — 87070 CULTURE OTHR SPECIMN AEROBIC: CPT | Performed by: NURSE PRACTITIONER

## 2024-01-01 PROCEDURE — 87186 SC STD MICRODIL/AGAR DIL: CPT | Performed by: NURSE PRACTITIONER

## 2024-01-01 PROCEDURE — 80048 BASIC METABOLIC PNL TOTAL CA: CPT | Performed by: NURSE PRACTITIONER

## 2024-01-01 PROCEDURE — 87468 ANAPLSMA PHGCYTOPHLM AMP PRB: CPT | Performed by: INTERNAL MEDICINE

## 2024-01-01 PROCEDURE — 82533 TOTAL CORTISOL: CPT | Performed by: PHYSICIAN ASSISTANT

## 2024-01-01 PROCEDURE — 25010000002 GLYCOPYRROLATE 0.2 MG/ML SOLUTION: Performed by: INTERNAL MEDICINE

## 2024-01-01 PROCEDURE — 94640 AIRWAY INHALATION TREATMENT: CPT

## 2024-01-01 PROCEDURE — 92610 EVALUATE SWALLOWING FUNCTION: CPT

## 2024-01-01 PROCEDURE — 87641 MR-STAPH DNA AMP PROBE: CPT | Performed by: NURSE PRACTITIONER

## 2024-01-01 RX ORDER — LORAZEPAM 0.5 MG/1
1 TABLET ORAL
Status: DISCONTINUED | OUTPATIENT
Start: 2024-01-01 | End: 2024-01-01

## 2024-01-01 RX ORDER — ACETAMINOPHEN 650 MG/1
650 SUPPOSITORY RECTAL EVERY 4 HOURS PRN
Status: DISCONTINUED | OUTPATIENT
Start: 2024-01-01 | End: 2024-01-01

## 2024-01-01 RX ORDER — IPRATROPIUM BROMIDE AND ALBUTEROL SULFATE 2.5; .5 MG/3ML; MG/3ML
3 SOLUTION RESPIRATORY (INHALATION)
Status: DISCONTINUED | OUTPATIENT
Start: 2024-01-01 | End: 2024-01-01

## 2024-01-01 RX ORDER — LORAZEPAM 2 MG/ML
0.5 CONCENTRATE ORAL
Status: DISCONTINUED | OUTPATIENT
Start: 2024-01-01 | End: 2024-01-01

## 2024-01-01 RX ORDER — BUDESONIDE 0.5 MG/2ML
0.5 INHALANT ORAL
Status: DISCONTINUED | OUTPATIENT
Start: 2024-01-01 | End: 2024-01-01

## 2024-01-01 RX ORDER — NITROGLYCERIN 0.4 MG/1
0.4 TABLET SUBLINGUAL
Status: DISCONTINUED | OUTPATIENT
Start: 2024-01-01 | End: 2024-11-05 | Stop reason: HOSPADM

## 2024-01-01 RX ORDER — GLYCOPYRROLATE 0.2 MG/ML
0.2 INJECTION INTRAMUSCULAR; INTRAVENOUS ONCE
Status: COMPLETED | OUTPATIENT
Start: 2024-01-01 | End: 2024-01-01

## 2024-01-01 RX ORDER — INSULIN LISPRO 100 [IU]/ML
2-9 INJECTION, SOLUTION INTRAVENOUS; SUBCUTANEOUS EVERY 6 HOURS SCHEDULED
Status: DISCONTINUED | OUTPATIENT
Start: 2024-01-01 | End: 2024-01-01

## 2024-01-01 RX ORDER — AMOXICILLIN 250 MG
2 CAPSULE ORAL 2 TIMES DAILY
Status: DISCONTINUED | OUTPATIENT
Start: 2024-01-01 | End: 2024-11-05 | Stop reason: HOSPADM

## 2024-01-01 RX ORDER — ACETAMINOPHEN 325 MG/1
650 TABLET ORAL EVERY 4 HOURS PRN
Status: DISCONTINUED | OUTPATIENT
Start: 2024-01-01 | End: 2024-01-01

## 2024-01-01 RX ORDER — ACETAMINOPHEN 160 MG/5ML
650 SOLUTION ORAL EVERY 4 HOURS PRN
Status: DISCONTINUED | OUTPATIENT
Start: 2024-01-01 | End: 2024-01-01

## 2024-01-01 RX ORDER — LORAZEPAM 2 MG/ML
0.5 INJECTION INTRAMUSCULAR
Status: DISCONTINUED | OUTPATIENT
Start: 2024-01-01 | End: 2024-01-01

## 2024-01-01 RX ORDER — SODIUM CHLORIDE 0.9 % (FLUSH) 0.9 %
10 SYRINGE (ML) INJECTION AS NEEDED
Status: DISCONTINUED | OUTPATIENT
Start: 2024-01-01 | End: 2024-11-05 | Stop reason: HOSPADM

## 2024-01-01 RX ORDER — LORAZEPAM 0.5 MG/1
0.5 TABLET ORAL
Status: DISCONTINUED | OUTPATIENT
Start: 2024-01-01 | End: 2024-01-01

## 2024-01-01 RX ORDER — ONDANSETRON 4 MG/1
4 TABLET, ORALLY DISINTEGRATING ORAL EVERY 6 HOURS PRN
Status: DISCONTINUED | OUTPATIENT
Start: 2024-01-01 | End: 2024-01-01

## 2024-01-01 RX ORDER — LORAZEPAM 2 MG/ML
2 CONCENTRATE ORAL
Status: DISCONTINUED | OUTPATIENT
Start: 2024-01-01 | End: 2024-01-01

## 2024-01-01 RX ORDER — MIDODRINE HYDROCHLORIDE 10 MG/1
10 TABLET ORAL
Status: DISCONTINUED | OUTPATIENT
Start: 2024-01-01 | End: 2024-01-01

## 2024-01-01 RX ORDER — HEPARIN SODIUM 1000 [USP'U]/ML
3800 INJECTION, SOLUTION INTRAVENOUS; SUBCUTANEOUS AS NEEDED
Status: DISCONTINUED | OUTPATIENT
Start: 2024-01-01 | End: 2024-01-01

## 2024-01-01 RX ORDER — DIPHENOXYLATE HYDROCHLORIDE AND ATROPINE SULFATE 2.5; .025 MG/1; MG/1
1 TABLET ORAL
Status: DISCONTINUED | OUTPATIENT
Start: 2024-01-01 | End: 2024-01-01

## 2024-01-01 RX ORDER — ONDANSETRON 2 MG/ML
4 INJECTION INTRAMUSCULAR; INTRAVENOUS EVERY 6 HOURS PRN
Status: DISCONTINUED | OUTPATIENT
Start: 2024-01-01 | End: 2024-11-05 | Stop reason: HOSPADM

## 2024-01-01 RX ORDER — POLYETHYLENE GLYCOL 3350 17 G/17G
17 POWDER, FOR SOLUTION ORAL DAILY PRN
Status: DISCONTINUED | OUTPATIENT
Start: 2024-01-01 | End: 2024-11-05 | Stop reason: HOSPADM

## 2024-01-01 RX ORDER — DEXTROSE MONOHYDRATE 25 G/50ML
25 INJECTION, SOLUTION INTRAVENOUS
Status: DISCONTINUED | OUTPATIENT
Start: 2024-01-01 | End: 2024-01-01

## 2024-01-01 RX ORDER — LEVOFLOXACIN 5 MG/ML
750 INJECTION, SOLUTION INTRAVENOUS ONCE
Status: COMPLETED | OUTPATIENT
Start: 2024-01-01 | End: 2024-01-01

## 2024-01-01 RX ORDER — NOREPINEPHRINE BITARTRATE 0.03 MG/ML
.02-.3 INJECTION, SOLUTION INTRAVENOUS
Status: DISCONTINUED | OUTPATIENT
Start: 2024-01-01 | End: 2024-01-01

## 2024-01-01 RX ORDER — ONDANSETRON 4 MG/1
4 TABLET, ORALLY DISINTEGRATING ORAL EVERY 6 HOURS PRN
Status: DISCONTINUED | OUTPATIENT
Start: 2024-01-01 | End: 2024-11-05 | Stop reason: HOSPADM

## 2024-01-01 RX ORDER — BISACODYL 10 MG
10 SUPPOSITORY, RECTAL RECTAL DAILY PRN
Status: DISCONTINUED | OUTPATIENT
Start: 2024-01-01 | End: 2024-01-01

## 2024-01-01 RX ORDER — LORAZEPAM 2 MG/ML
2 CONCENTRATE ORAL
Status: DISCONTINUED | OUTPATIENT
Start: 2024-01-01 | End: 2024-11-05 | Stop reason: HOSPADM

## 2024-01-01 RX ORDER — SODIUM CHLORIDE 0.9 % (FLUSH) 0.9 %
10 SYRINGE (ML) INJECTION EVERY 12 HOURS SCHEDULED
Status: DISCONTINUED | OUTPATIENT
Start: 2024-01-01 | End: 2024-11-05 | Stop reason: HOSPADM

## 2024-01-01 RX ORDER — LORAZEPAM 2 MG/1
2 TABLET ORAL
Status: DISCONTINUED | OUTPATIENT
Start: 2024-01-01 | End: 2024-01-01

## 2024-01-01 RX ORDER — DIPHENOXYLATE HYDROCHLORIDE AND ATROPINE SULFATE 2.5; .025 MG/1; MG/1
1 TABLET ORAL
Status: DISCONTINUED | OUTPATIENT
Start: 2024-01-01 | End: 2024-11-05 | Stop reason: HOSPADM

## 2024-01-01 RX ORDER — ACETAMINOPHEN 160 MG/5ML
650 SOLUTION ORAL EVERY 4 HOURS PRN
Status: DISCONTINUED | OUTPATIENT
Start: 2024-01-01 | End: 2024-11-05 | Stop reason: HOSPADM

## 2024-01-01 RX ORDER — LORAZEPAM 2 MG/ML
0.5 CONCENTRATE ORAL
Status: DISCONTINUED | OUTPATIENT
Start: 2024-01-01 | End: 2024-11-05 | Stop reason: HOSPADM

## 2024-01-01 RX ORDER — ACETAMINOPHEN 650 MG/1
650 SUPPOSITORY RECTAL EVERY 4 HOURS PRN
Status: DISCONTINUED | OUTPATIENT
Start: 2024-01-01 | End: 2024-11-05 | Stop reason: HOSPADM

## 2024-01-01 RX ORDER — LORAZEPAM 2 MG/ML
1 INJECTION INTRAMUSCULAR
Status: DISCONTINUED | OUTPATIENT
Start: 2024-01-01 | End: 2024-01-01

## 2024-01-01 RX ORDER — ACETAMINOPHEN 325 MG/1
650 TABLET ORAL EVERY 4 HOURS PRN
Status: DISCONTINUED | OUTPATIENT
Start: 2024-01-01 | End: 2024-11-05 | Stop reason: HOSPADM

## 2024-01-01 RX ORDER — LORAZEPAM 2 MG/ML
2 INJECTION INTRAMUSCULAR
Status: DISCONTINUED | OUTPATIENT
Start: 2024-01-01 | End: 2024-01-01

## 2024-01-01 RX ORDER — LORAZEPAM 2 MG/ML
0.5 INJECTION INTRAMUSCULAR
Status: DISCONTINUED | OUTPATIENT
Start: 2024-01-01 | End: 2024-11-05 | Stop reason: HOSPADM

## 2024-01-01 RX ORDER — LEVOFLOXACIN 5 MG/ML
500 INJECTION, SOLUTION INTRAVENOUS
Status: DISCONTINUED | OUTPATIENT
Start: 2024-01-01 | End: 2024-01-01

## 2024-01-01 RX ORDER — ONDANSETRON 2 MG/ML
4 INJECTION INTRAMUSCULAR; INTRAVENOUS EVERY 6 HOURS PRN
Status: DISCONTINUED | OUTPATIENT
Start: 2024-01-01 | End: 2024-01-01

## 2024-01-01 RX ORDER — AMOXICILLIN 250 MG
2 CAPSULE ORAL 2 TIMES DAILY
Status: DISCONTINUED | OUTPATIENT
Start: 2024-01-01 | End: 2024-01-01

## 2024-01-01 RX ORDER — MORPHINE SULFATE 2 MG/ML
1 INJECTION, SOLUTION INTRAMUSCULAR; INTRAVENOUS
Status: DISCONTINUED | OUTPATIENT
Start: 2024-01-01 | End: 2024-11-05 | Stop reason: HOSPADM

## 2024-01-01 RX ORDER — MORPHINE SULFATE 20 MG/ML
10 SOLUTION ORAL
Status: DISCONTINUED | OUTPATIENT
Start: 2024-01-01 | End: 2024-11-05 | Stop reason: HOSPADM

## 2024-01-01 RX ORDER — SODIUM CHLORIDE 9 MG/ML
40 INJECTION, SOLUTION INTRAVENOUS AS NEEDED
Status: DISCONTINUED | OUTPATIENT
Start: 2024-01-01 | End: 2024-01-01

## 2024-01-01 RX ORDER — BISACODYL 10 MG
10 SUPPOSITORY, RECTAL RECTAL DAILY PRN
Status: DISCONTINUED | OUTPATIENT
Start: 2024-01-01 | End: 2024-11-05 | Stop reason: HOSPADM

## 2024-01-01 RX ORDER — COLCHICINE 0.6 MG/1
0.3 TABLET ORAL EVERY 12 HOURS SCHEDULED
Status: DISCONTINUED | OUTPATIENT
Start: 2024-01-01 | End: 2024-01-01

## 2024-01-01 RX ORDER — ARFORMOTEROL TARTRATE 15 UG/2ML
15 SOLUTION RESPIRATORY (INHALATION)
Status: DISCONTINUED | OUTPATIENT
Start: 2024-01-01 | End: 2024-01-01

## 2024-01-01 RX ORDER — IBUPROFEN 600 MG/1
1 TABLET ORAL
Status: DISCONTINUED | OUTPATIENT
Start: 2024-01-01 | End: 2024-01-01

## 2024-01-01 RX ORDER — INSULIN LISPRO 100 [IU]/ML
2-9 INJECTION, SOLUTION INTRAVENOUS; SUBCUTANEOUS
Status: DISCONTINUED | OUTPATIENT
Start: 2024-01-01 | End: 2024-01-01

## 2024-01-01 RX ORDER — GLYCOPYRROLATE 1 MG/1
1 TABLET ORAL 3 TIMES DAILY
Status: DISCONTINUED | OUTPATIENT
Start: 2024-01-01 | End: 2024-01-01

## 2024-01-01 RX ORDER — LORAZEPAM 2 MG/ML
2 INJECTION INTRAMUSCULAR
Status: DISCONTINUED | OUTPATIENT
Start: 2024-01-01 | End: 2024-11-05 | Stop reason: HOSPADM

## 2024-01-01 RX ORDER — LORAZEPAM 2 MG/ML
1 INJECTION INTRAMUSCULAR
Status: DISCONTINUED | OUTPATIENT
Start: 2024-01-01 | End: 2024-11-05 | Stop reason: HOSPADM

## 2024-01-01 RX ORDER — MORPHINE SULFATE 20 MG/ML
20 SOLUTION ORAL
Status: DISCONTINUED | OUTPATIENT
Start: 2024-01-01 | End: 2024-11-05 | Stop reason: HOSPADM

## 2024-01-01 RX ORDER — NICOTINE POLACRILEX 4 MG
15 LOZENGE BUCCAL
Status: DISCONTINUED | OUTPATIENT
Start: 2024-01-01 | End: 2024-01-01

## 2024-01-01 RX ORDER — POLYETHYLENE GLYCOL 3350 17 G/17G
17 POWDER, FOR SOLUTION ORAL DAILY PRN
Status: DISCONTINUED | OUTPATIENT
Start: 2024-01-01 | End: 2024-01-01

## 2024-01-01 RX ORDER — LORAZEPAM 2 MG/ML
1 CONCENTRATE ORAL
Status: DISCONTINUED | OUTPATIENT
Start: 2024-01-01 | End: 2024-01-01

## 2024-01-01 RX ORDER — BISACODYL 5 MG/1
5 TABLET, DELAYED RELEASE ORAL DAILY PRN
Status: DISCONTINUED | OUTPATIENT
Start: 2024-01-01 | End: 2024-01-01

## 2024-01-01 RX ORDER — IPRATROPIUM BROMIDE AND ALBUTEROL SULFATE 2.5; .5 MG/3ML; MG/3ML
3 SOLUTION RESPIRATORY (INHALATION) EVERY 4 HOURS PRN
Status: DISCONTINUED | OUTPATIENT
Start: 2024-01-01 | End: 2024-11-05 | Stop reason: HOSPADM

## 2024-01-01 RX ORDER — LORAZEPAM 2 MG/ML
1 CONCENTRATE ORAL
Status: DISCONTINUED | OUTPATIENT
Start: 2024-01-01 | End: 2024-11-05 | Stop reason: HOSPADM

## 2024-01-01 RX ORDER — HEPARIN SODIUM 5000 [USP'U]/ML
5000 INJECTION, SOLUTION INTRAVENOUS; SUBCUTANEOUS EVERY 8 HOURS SCHEDULED
Status: DISCONTINUED | OUTPATIENT
Start: 2024-01-01 | End: 2024-01-01

## 2024-01-01 RX ORDER — GLYCOPYRROLATE 0.2 MG/ML
0.2 INJECTION INTRAMUSCULAR; INTRAVENOUS EVERY 6 HOURS PRN
Status: DISCONTINUED | OUTPATIENT
Start: 2024-01-01 | End: 2024-11-05 | Stop reason: HOSPADM

## 2024-01-01 RX ORDER — MIDODRINE HYDROCHLORIDE 5 MG/1
5 TABLET ORAL
Status: DISCONTINUED | OUTPATIENT
Start: 2024-01-01 | End: 2024-01-01

## 2024-01-01 RX ADMIN — NOREPINEPHRINE BITARTRATE 0.1 MCG/KG/MIN: 0.03 INJECTION, SOLUTION INTRAVENOUS at 04:04

## 2024-01-01 RX ADMIN — LEVOTHYROXINE SODIUM 125 MCG: 75 TABLET ORAL at 06:05

## 2024-01-01 RX ADMIN — INSULIN LISPRO 6 UNITS: 100 INJECTION, SOLUTION INTRAVENOUS; SUBCUTANEOUS at 06:30

## 2024-01-01 RX ADMIN — IPRATROPIUM BROMIDE AND ALBUTEROL SULFATE 3 ML: .5; 3 SOLUTION RESPIRATORY (INHALATION) at 07:16

## 2024-01-01 RX ADMIN — Medication 10 ML: at 21:17

## 2024-01-01 RX ADMIN — BUDESONIDE 0.5 MG: 0.5 SUSPENSION RESPIRATORY (INHALATION) at 07:09

## 2024-01-01 RX ADMIN — NOREPINEPHRINE BITARTRATE 0.1 MCG/KG/MIN: 0.03 INJECTION, SOLUTION INTRAVENOUS at 20:04

## 2024-01-01 RX ADMIN — MIDODRINE HYDROCHLORIDE 10 MG: 10 TABLET ORAL at 10:02

## 2024-01-01 RX ADMIN — IPRATROPIUM BROMIDE AND ALBUTEROL SULFATE 3 ML: .5; 3 SOLUTION RESPIRATORY (INHALATION) at 01:13

## 2024-01-01 RX ADMIN — IPRATROPIUM BROMIDE AND ALBUTEROL SULFATE 3 ML: .5; 3 SOLUTION RESPIRATORY (INHALATION) at 06:54

## 2024-01-01 RX ADMIN — HEPARIN SODIUM 5000 UNITS: 5000 INJECTION INTRAVENOUS; SUBCUTANEOUS at 21:31

## 2024-01-01 RX ADMIN — IPRATROPIUM BROMIDE AND ALBUTEROL SULFATE 3 ML: .5; 3 SOLUTION RESPIRATORY (INHALATION) at 19:17

## 2024-01-01 RX ADMIN — APIXABAN 2.5 MG: 2.5 TABLET, FILM COATED ORAL at 20:29

## 2024-01-01 RX ADMIN — NOREPINEPHRINE BITARTRATE 0.1 MCG/KG/MIN: 0.03 INJECTION, SOLUTION INTRAVENOUS at 02:18

## 2024-01-01 RX ADMIN — BUDESONIDE 0.5 MG: 0.5 SUSPENSION RESPIRATORY (INHALATION) at 07:16

## 2024-01-01 RX ADMIN — BUDESONIDE 0.5 MG: 0.5 SUSPENSION RESPIRATORY (INHALATION) at 19:16

## 2024-01-01 RX ADMIN — NOREPINEPHRINE BITARTRATE 0.08 MCG/KG/MIN: 0.03 INJECTION, SOLUTION INTRAVENOUS at 15:53

## 2024-01-01 RX ADMIN — VASOPRESSIN 0.04 UNITS/MIN: 0.2 SOLUTION INTRAVENOUS at 23:00

## 2024-01-01 RX ADMIN — BUDESONIDE 0.5 MG: 0.5 SUSPENSION RESPIRATORY (INHALATION) at 07:54

## 2024-01-01 RX ADMIN — ARFORMOTEROL TARTRATE 15 MCG: 15 SOLUTION RESPIRATORY (INHALATION) at 07:16

## 2024-01-01 RX ADMIN — CEFEPIME 2000 MG: 2 INJECTION, POWDER, FOR SOLUTION INTRAVENOUS at 23:19

## 2024-01-01 RX ADMIN — APIXABAN 2.5 MG: 2.5 TABLET, FILM COATED ORAL at 10:02

## 2024-01-01 RX ADMIN — INSULIN LISPRO 4 UNITS: 100 INJECTION, SOLUTION INTRAVENOUS; SUBCUTANEOUS at 17:58

## 2024-01-01 RX ADMIN — HEPARIN SODIUM 3800 UNITS: 1000 INJECTION INTRAVENOUS; SUBCUTANEOUS at 14:35

## 2024-01-01 RX ADMIN — IPRATROPIUM BROMIDE AND ALBUTEROL SULFATE 3 ML: .5; 3 SOLUTION RESPIRATORY (INHALATION) at 07:09

## 2024-01-01 RX ADMIN — IPRATROPIUM BROMIDE AND ALBUTEROL SULFATE 3 ML: .5; 3 SOLUTION RESPIRATORY (INHALATION) at 00:25

## 2024-01-01 RX ADMIN — Medication 10 ML: at 21:32

## 2024-01-01 RX ADMIN — BUDESONIDE 0.5 MG: 0.5 SUSPENSION RESPIRATORY (INHALATION) at 06:54

## 2024-01-01 RX ADMIN — BUDESONIDE 0.5 MG: 0.5 SUSPENSION RESPIRATORY (INHALATION) at 19:35

## 2024-01-01 RX ADMIN — GLYCOPYRROLATE 0.2 MG: 0.2 INJECTION INTRAMUSCULAR; INTRAVENOUS at 18:57

## 2024-01-01 RX ADMIN — INSULIN LISPRO 4 UNITS: 100 INJECTION, SOLUTION INTRAVENOUS; SUBCUTANEOUS at 17:49

## 2024-01-01 RX ADMIN — BUDESONIDE 0.5 MG: 0.5 SUSPENSION RESPIRATORY (INHALATION) at 19:19

## 2024-01-01 RX ADMIN — INSULIN LISPRO 2 UNITS: 100 INJECTION, SOLUTION INTRAVENOUS; SUBCUTANEOUS at 07:56

## 2024-01-01 RX ADMIN — IPRATROPIUM BROMIDE AND ALBUTEROL SULFATE 3 ML: .5; 3 SOLUTION RESPIRATORY (INHALATION) at 13:35

## 2024-01-01 RX ADMIN — HEPARIN SODIUM 5000 UNITS: 5000 INJECTION INTRAVENOUS; SUBCUTANEOUS at 06:21

## 2024-01-01 RX ADMIN — HEPARIN SODIUM 5000 UNITS: 5000 INJECTION INTRAVENOUS; SUBCUTANEOUS at 14:22

## 2024-01-01 RX ADMIN — Medication 10 ML: at 08:18

## 2024-01-01 RX ADMIN — MIDODRINE HYDROCHLORIDE 10 MG: 10 TABLET ORAL at 14:09

## 2024-01-01 RX ADMIN — HEPARIN SODIUM 5000 UNITS: 5000 INJECTION INTRAVENOUS; SUBCUTANEOUS at 21:39

## 2024-01-01 RX ADMIN — INSULIN LISPRO 2 UNITS: 100 INJECTION, SOLUTION INTRAVENOUS; SUBCUTANEOUS at 20:05

## 2024-01-01 RX ADMIN — IPRATROPIUM BROMIDE AND ALBUTEROL SULFATE 3 ML: .5; 3 SOLUTION RESPIRATORY (INHALATION) at 02:01

## 2024-01-01 RX ADMIN — LEVOFLOXACIN 750 MG: 750 INJECTION, SOLUTION INTRAVENOUS at 14:05

## 2024-01-01 RX ADMIN — NOREPINEPHRINE BITARTRATE 0.16 MCG/KG/MIN: 0.03 INJECTION, SOLUTION INTRAVENOUS at 08:58

## 2024-01-01 RX ADMIN — NOREPINEPHRINE BITARTRATE 0.1 MCG/KG/MIN: 0.03 INJECTION, SOLUTION INTRAVENOUS at 20:30

## 2024-01-01 RX ADMIN — Medication 10 ML: at 10:06

## 2024-01-01 RX ADMIN — ARFORMOTEROL TARTRATE 15 MCG: 15 SOLUTION RESPIRATORY (INHALATION) at 07:13

## 2024-01-01 RX ADMIN — MORPHINE SULFATE 10 MG: 10 SOLUTION ORAL at 12:28

## 2024-01-01 RX ADMIN — MUPIROCIN 1 APPLICATION: 20 OINTMENT TOPICAL at 21:38

## 2024-01-01 RX ADMIN — BUDESONIDE 0.5 MG: 0.5 SUSPENSION RESPIRATORY (INHALATION) at 07:13

## 2024-01-01 RX ADMIN — APIXABAN 2.5 MG: 2.5 TABLET, FILM COATED ORAL at 17:15

## 2024-01-01 RX ADMIN — LEVOTHYROXINE SODIUM 125 MCG: 75 TABLET ORAL at 05:21

## 2024-01-01 RX ADMIN — MORPHINE SULFATE 20 MG: 10 SOLUTION ORAL at 14:17

## 2024-01-01 RX ADMIN — MUPIROCIN 1 APPLICATION: 20 OINTMENT TOPICAL at 11:40

## 2024-01-01 RX ADMIN — INSULIN LISPRO 4 UNITS: 100 INJECTION, SOLUTION INTRAVENOUS; SUBCUTANEOUS at 20:29

## 2024-01-01 RX ADMIN — NOREPINEPHRINE BITARTRATE 0.12 MCG/KG/MIN: 0.03 INJECTION, SOLUTION INTRAVENOUS at 15:07

## 2024-01-01 RX ADMIN — ACETAMINOPHEN 650 MG: 325 TABLET ORAL at 22:23

## 2024-01-01 RX ADMIN — ARFORMOTEROL TARTRATE 15 MCG: 15 SOLUTION RESPIRATORY (INHALATION) at 19:35

## 2024-01-01 RX ADMIN — ONDANSETRON 4 MG: 2 INJECTION INTRAMUSCULAR; INTRAVENOUS at 07:27

## 2024-01-01 RX ADMIN — SENNOSIDES AND DOCUSATE SODIUM 2 TABLET: 50; 8.6 TABLET ORAL at 20:29

## 2024-01-01 RX ADMIN — IPRATROPIUM BROMIDE AND ALBUTEROL SULFATE 3 ML: .5; 3 SOLUTION RESPIRATORY (INHALATION) at 07:13

## 2024-01-01 RX ADMIN — Medication 10 ML: at 20:25

## 2024-01-01 RX ADMIN — Medication 10 ML: at 21:56

## 2024-01-01 RX ADMIN — VASOPRESSIN 0.04 UNITS/MIN: 0.2 SOLUTION INTRAVENOUS at 17:19

## 2024-01-01 RX ADMIN — APIXABAN 2.5 MG: 2.5 TABLET, FILM COATED ORAL at 21:17

## 2024-01-01 RX ADMIN — MORPHINE SULFATE 10 MG: 10 SOLUTION ORAL at 10:11

## 2024-01-01 RX ADMIN — INSULIN LISPRO 4 UNITS: 100 INJECTION, SOLUTION INTRAVENOUS; SUBCUTANEOUS at 08:17

## 2024-01-01 RX ADMIN — CEFEPIME 2000 MG: 2 INJECTION, POWDER, FOR SOLUTION INTRAVENOUS at 21:39

## 2024-01-01 RX ADMIN — HEPARIN SODIUM 5000 UNITS: 5000 INJECTION INTRAVENOUS; SUBCUTANEOUS at 13:34

## 2024-01-01 RX ADMIN — IPRATROPIUM BROMIDE AND ALBUTEROL SULFATE 3 ML: .5; 3 SOLUTION RESPIRATORY (INHALATION) at 19:27

## 2024-01-01 RX ADMIN — ARFORMOTEROL TARTRATE 15 MCG: 15 SOLUTION RESPIRATORY (INHALATION) at 19:19

## 2024-01-01 RX ADMIN — ARFORMOTEROL TARTRATE 15 MCG: 15 SOLUTION RESPIRATORY (INHALATION) at 07:54

## 2024-01-01 RX ADMIN — INSULIN LISPRO 6 UNITS: 100 INJECTION, SOLUTION INTRAVENOUS; SUBCUTANEOUS at 06:40

## 2024-01-01 RX ADMIN — MUPIROCIN 1 APPLICATION: 20 OINTMENT TOPICAL at 08:52

## 2024-01-01 RX ADMIN — ACETAMINOPHEN 650 MG: 325 TABLET ORAL at 18:03

## 2024-01-01 RX ADMIN — MUPIROCIN 1 APPLICATION: 20 OINTMENT TOPICAL at 21:32

## 2024-01-01 RX ADMIN — INSULIN LISPRO 6 UNITS: 100 INJECTION, SOLUTION INTRAVENOUS; SUBCUTANEOUS at 21:17

## 2024-01-01 RX ADMIN — CEFEPIME 2000 MG: 2 INJECTION, POWDER, FOR SOLUTION INTRAVENOUS at 23:37

## 2024-01-01 RX ADMIN — MUPIROCIN 1 APPLICATION: 20 OINTMENT TOPICAL at 10:08

## 2024-01-01 RX ADMIN — NOREPINEPHRINE BITARTRATE 0.1 MCG/KG/MIN: 0.03 INJECTION, SOLUTION INTRAVENOUS at 14:46

## 2024-01-01 RX ADMIN — MIDODRINE HYDROCHLORIDE 10 MG: 10 TABLET ORAL at 21:17

## 2024-01-01 RX ADMIN — CEFEPIME 2000 MG: 2 INJECTION, POWDER, FOR SOLUTION INTRAVENOUS at 22:08

## 2024-01-01 RX ADMIN — IPRATROPIUM BROMIDE AND ALBUTEROL SULFATE 3 ML: .5; 3 SOLUTION RESPIRATORY (INHALATION) at 00:18

## 2024-01-01 RX ADMIN — MORPHINE SULFATE 10 MG: 10 SOLUTION ORAL at 18:57

## 2024-01-01 RX ADMIN — MIDODRINE HYDROCHLORIDE 10 MG: 10 TABLET ORAL at 14:05

## 2024-01-01 RX ADMIN — MUPIROCIN 1 APPLICATION: 20 OINTMENT TOPICAL at 21:39

## 2024-01-01 RX ADMIN — HEPARIN SODIUM 5000 UNITS: 5000 INJECTION INTRAVENOUS; SUBCUTANEOUS at 06:45

## 2024-01-01 RX ADMIN — Medication 10 ML: at 08:36

## 2024-01-01 RX ADMIN — NOREPINEPHRINE BITARTRATE 0.08 MCG/KG/MIN: 0.03 INJECTION, SOLUTION INTRAVENOUS at 01:02

## 2024-01-01 RX ADMIN — VANCOMYCIN HYDROCHLORIDE 2250 MG: 5 INJECTION, POWDER, LYOPHILIZED, FOR SOLUTION INTRAVENOUS at 00:58

## 2024-01-01 RX ADMIN — IPRATROPIUM BROMIDE AND ALBUTEROL SULFATE 3 ML: .5; 3 SOLUTION RESPIRATORY (INHALATION) at 19:02

## 2024-01-01 RX ADMIN — MIDODRINE HYDROCHLORIDE 10 MG: 10 TABLET ORAL at 08:36

## 2024-01-01 RX ADMIN — HEPARIN SODIUM 5000 UNITS: 5000 INJECTION INTRAVENOUS; SUBCUTANEOUS at 21:38

## 2024-01-01 RX ADMIN — INSULIN LISPRO 2 UNITS: 100 INJECTION, SOLUTION INTRAVENOUS; SUBCUTANEOUS at 17:46

## 2024-01-01 RX ADMIN — MIDODRINE HYDROCHLORIDE 10 MG: 10 TABLET ORAL at 17:15

## 2024-01-01 RX ADMIN — IPRATROPIUM BROMIDE AND ALBUTEROL SULFATE 3 ML: .5; 3 SOLUTION RESPIRATORY (INHALATION) at 11:49

## 2024-01-01 RX ADMIN — ARFORMOTEROL TARTRATE 15 MCG: 15 SOLUTION RESPIRATORY (INHALATION) at 10:39

## 2024-01-01 RX ADMIN — Medication 10 ML: at 20:30

## 2024-01-01 RX ADMIN — ACETAMINOPHEN 650 MG: 325 TABLET ORAL at 20:29

## 2024-01-01 RX ADMIN — SENNOSIDES AND DOCUSATE SODIUM 2 TABLET: 50; 8.6 TABLET ORAL at 21:38

## 2024-01-01 RX ADMIN — INSULIN LISPRO 2 UNITS: 100 INJECTION, SOLUTION INTRAVENOUS; SUBCUTANEOUS at 17:14

## 2024-01-01 RX ADMIN — ARFORMOTEROL TARTRATE 15 MCG: 15 SOLUTION RESPIRATORY (INHALATION) at 06:24

## 2024-01-01 RX ADMIN — ARFORMOTEROL TARTRATE 15 MCG: 15 SOLUTION RESPIRATORY (INHALATION) at 19:17

## 2024-01-01 RX ADMIN — ARFORMOTEROL TARTRATE 15 MCG: 15 SOLUTION RESPIRATORY (INHALATION) at 19:02

## 2024-01-01 RX ADMIN — HEPARIN SODIUM 3800 UNITS: 1000 INJECTION INTRAVENOUS; SUBCUTANEOUS at 13:00

## 2024-01-01 RX ADMIN — ACETAMINOPHEN 650 MG: 325 TABLET ORAL at 15:28

## 2024-01-01 RX ADMIN — IPRATROPIUM BROMIDE AND ALBUTEROL SULFATE 3 ML: .5; 3 SOLUTION RESPIRATORY (INHALATION) at 06:58

## 2024-01-01 RX ADMIN — CEFEPIME 2000 MG: 2 INJECTION, POWDER, FOR SOLUTION INTRAVENOUS at 21:37

## 2024-01-01 RX ADMIN — APIXABAN 2.5 MG: 2.5 TABLET, FILM COATED ORAL at 08:36

## 2024-01-01 RX ADMIN — GLYCOPYRROLATE 0.2 MG: 0.2 INJECTION INTRAMUSCULAR; INTRAVENOUS at 16:05

## 2024-01-01 RX ADMIN — MIDODRINE HYDROCHLORIDE 10 MG: 10 TABLET ORAL at 20:29

## 2024-01-01 RX ADMIN — HEPARIN SODIUM 3800 UNITS: 1000 INJECTION INTRAVENOUS; SUBCUTANEOUS at 16:50

## 2024-01-01 RX ADMIN — IPRATROPIUM BROMIDE AND ALBUTEROL SULFATE 3 ML: .5; 3 SOLUTION RESPIRATORY (INHALATION) at 06:24

## 2024-01-01 RX ADMIN — BUDESONIDE 0.5 MG: 0.5 SUSPENSION RESPIRATORY (INHALATION) at 19:17

## 2024-01-01 RX ADMIN — ARFORMOTEROL TARTRATE 15 MCG: 15 SOLUTION RESPIRATORY (INHALATION) at 19:16

## 2024-01-01 RX ADMIN — SENNOSIDES AND DOCUSATE SODIUM 2 TABLET: 50; 8.6 TABLET ORAL at 20:05

## 2024-01-01 RX ADMIN — BUDESONIDE 0.5 MG: 0.5 SUSPENSION RESPIRATORY (INHALATION) at 10:39

## 2024-01-01 RX ADMIN — NOREPINEPHRINE BITARTRATE 0.1 MCG/KG/MIN: 0.03 INJECTION, SOLUTION INTRAVENOUS at 03:14

## 2024-01-01 RX ADMIN — IPRATROPIUM BROMIDE AND ALBUTEROL SULFATE 3 ML: .5; 3 SOLUTION RESPIRATORY (INHALATION) at 00:31

## 2024-01-01 RX ADMIN — HEPARIN SODIUM 5000 UNITS: 5000 INJECTION INTRAVENOUS; SUBCUTANEOUS at 15:53

## 2024-01-01 RX ADMIN — Medication 10 ML: at 21:40

## 2024-01-01 RX ADMIN — INSULIN LISPRO 2 UNITS: 100 INJECTION, SOLUTION INTRAVENOUS; SUBCUTANEOUS at 11:40

## 2024-01-01 RX ADMIN — Medication 10 ML: at 08:52

## 2024-01-01 RX ADMIN — LEVOTHYROXINE SODIUM 125 MCG: 75 TABLET ORAL at 05:37

## 2024-01-01 RX ADMIN — INSULIN LISPRO 4 UNITS: 100 INJECTION, SOLUTION INTRAVENOUS; SUBCUTANEOUS at 11:27

## 2024-01-01 RX ADMIN — IPRATROPIUM BROMIDE AND ALBUTEROL SULFATE 3 ML: .5; 3 SOLUTION RESPIRATORY (INHALATION) at 19:19

## 2024-01-01 RX ADMIN — MUPIROCIN 1 APPLICATION: 20 OINTMENT TOPICAL at 20:05

## 2024-01-01 RX ADMIN — IPRATROPIUM BROMIDE AND ALBUTEROL SULFATE 3 ML: .5; 3 SOLUTION RESPIRATORY (INHALATION) at 19:35

## 2024-01-01 RX ADMIN — IPRATROPIUM BROMIDE AND ALBUTEROL SULFATE 3 ML: .5; 3 SOLUTION RESPIRATORY (INHALATION) at 11:42

## 2024-01-01 RX ADMIN — HEPARIN SODIUM 5000 UNITS: 5000 INJECTION INTRAVENOUS; SUBCUTANEOUS at 05:20

## 2024-01-01 RX ADMIN — INSULIN LISPRO 2 UNITS: 100 INJECTION, SOLUTION INTRAVENOUS; SUBCUTANEOUS at 21:40

## 2024-01-01 RX ADMIN — BUDESONIDE 0.5 MG: 0.5 SUSPENSION RESPIRATORY (INHALATION) at 19:27

## 2024-01-01 RX ADMIN — IPRATROPIUM BROMIDE AND ALBUTEROL SULFATE 3 ML: .5; 3 SOLUTION RESPIRATORY (INHALATION) at 19:16

## 2024-01-01 RX ADMIN — ARFORMOTEROL TARTRATE 15 MCG: 15 SOLUTION RESPIRATORY (INHALATION) at 07:09

## 2024-01-01 RX ADMIN — MUPIROCIN 1 APPLICATION: 20 OINTMENT TOPICAL at 21:31

## 2024-01-01 RX ADMIN — IPRATROPIUM BROMIDE AND ALBUTEROL SULFATE 3 ML: .5; 3 SOLUTION RESPIRATORY (INHALATION) at 11:08

## 2024-01-01 RX ADMIN — NOREPINEPHRINE BITARTRATE 0.04 MCG/KG/MIN: 0.03 INJECTION, SOLUTION INTRAVENOUS at 10:07

## 2024-01-01 RX ADMIN — SENNOSIDES AND DOCUSATE SODIUM 2 TABLET: 50; 8.6 TABLET ORAL at 08:36

## 2024-01-01 RX ADMIN — SENNOSIDES AND DOCUSATE SODIUM 2 TABLET: 50; 8.6 TABLET ORAL at 21:17

## 2024-01-01 RX ADMIN — INSULIN LISPRO 2 UNITS: 100 INJECTION, SOLUTION INTRAVENOUS; SUBCUTANEOUS at 11:29

## 2024-01-01 RX ADMIN — NOREPINEPHRINE BITARTRATE 0.1 MCG/KG/MIN: 0.03 INJECTION, SOLUTION INTRAVENOUS at 03:48

## 2024-01-01 RX ADMIN — MUPIROCIN 1 APPLICATION: 20 OINTMENT TOPICAL at 08:17

## 2024-01-01 RX ADMIN — BUDESONIDE 0.5 MG: 0.5 SUSPENSION RESPIRATORY (INHALATION) at 06:24

## 2024-01-01 RX ADMIN — IPRATROPIUM BROMIDE AND ALBUTEROL SULFATE 3 ML: .5; 3 SOLUTION RESPIRATORY (INHALATION) at 07:54

## 2024-01-01 RX ADMIN — SULFUR HEXAFLUORIDE 5 ML: KIT at 10:38

## 2024-01-01 RX ADMIN — ARFORMOTEROL TARTRATE 15 MCG: 15 SOLUTION RESPIRATORY (INHALATION) at 06:54

## 2024-01-01 RX ADMIN — IPRATROPIUM BROMIDE AND ALBUTEROL SULFATE 3 ML: .5; 3 SOLUTION RESPIRATORY (INHALATION) at 12:34

## 2024-01-01 RX ADMIN — BUDESONIDE 0.5 MG: 0.5 SUSPENSION RESPIRATORY (INHALATION) at 19:02

## 2024-01-01 RX ADMIN — ARFORMOTEROL TARTRATE 15 MCG: 15 SOLUTION RESPIRATORY (INHALATION) at 19:27

## 2024-01-01 RX ADMIN — IPRATROPIUM BROMIDE AND ALBUTEROL SULFATE 3 ML: .5; 3 SOLUTION RESPIRATORY (INHALATION) at 23:59

## 2024-09-02 ENCOUNTER — APPOINTMENT (OUTPATIENT)
Dept: GENERAL RADIOLOGY | Facility: HOSPITAL | Age: 79
End: 2024-09-02
Payer: MEDICARE

## 2024-09-02 ENCOUNTER — HOSPITAL ENCOUNTER (INPATIENT)
Facility: HOSPITAL | Age: 79
LOS: 8 days | Discharge: HOME HEALTH | End: 2024-09-10
Attending: INTERNAL MEDICINE | Admitting: FAMILY MEDICINE
Payer: MEDICARE

## 2024-09-02 DIAGNOSIS — Z78.9 IMPAIRED MOBILITY AND ADLS: Primary | ICD-10-CM

## 2024-09-02 DIAGNOSIS — R26.2 DIFFICULTY WALKING: ICD-10-CM

## 2024-09-02 DIAGNOSIS — Z74.09 IMPAIRED MOBILITY AND ADLS: Primary | ICD-10-CM

## 2024-09-02 PROBLEM — E87.5 HYPERKALEMIA: Status: ACTIVE | Noted: 2024-09-02

## 2024-09-02 LAB
ALBUMIN SERPL-MCNC: 2.8 G/DL (ref 3.5–5.2)
ALBUMIN/GLOB SERPL: 0.7 G/DL
ALP SERPL-CCNC: 119 U/L (ref 39–117)
ALT SERPL W P-5'-P-CCNC: 5 U/L (ref 1–41)
ANION GAP SERPL CALCULATED.3IONS-SCNC: 16.8 MMOL/L (ref 5–15)
AST SERPL-CCNC: 25 U/L (ref 1–40)
BASOPHILS # BLD AUTO: 0.07 10*3/MM3 (ref 0–0.2)
BASOPHILS NFR BLD AUTO: 0.8 % (ref 0–1.5)
BILIRUB SERPL-MCNC: 0.5 MG/DL (ref 0–1.2)
BUN SERPL-MCNC: 66 MG/DL (ref 8–23)
BUN/CREAT SERPL: 6.7 (ref 7–25)
CALCIUM SPEC-SCNC: 7.8 MG/DL (ref 8.6–10.5)
CHLORIDE SERPL-SCNC: 100 MMOL/L (ref 98–107)
CO2 SERPL-SCNC: 23.2 MMOL/L (ref 22–29)
CREAT SERPL-MCNC: 9.88 MG/DL (ref 0.76–1.27)
D-LACTATE SERPL-SCNC: 0.8 MMOL/L (ref 0.5–2)
DEPRECATED RDW RBC AUTO: 56.7 FL (ref 37–54)
EGFRCR SERPLBLD CKD-EPI 2021: 5 ML/MIN/1.73
EOSINOPHIL # BLD AUTO: 0.21 10*3/MM3 (ref 0–0.4)
EOSINOPHIL NFR BLD AUTO: 2.4 % (ref 0.3–6.2)
ERYTHROCYTE [DISTWIDTH] IN BLOOD BY AUTOMATED COUNT: 15.9 % (ref 12.3–15.4)
GLOBULIN UR ELPH-MCNC: 3.9 GM/DL
GLUCOSE SERPL-MCNC: 114 MG/DL (ref 65–99)
HCT VFR BLD AUTO: 34.9 % (ref 37.5–51)
HGB BLD-MCNC: 11 G/DL (ref 13–17.7)
IMM GRANULOCYTES # BLD AUTO: 0.05 10*3/MM3 (ref 0–0.05)
IMM GRANULOCYTES NFR BLD AUTO: 0.6 % (ref 0–0.5)
LYMPHOCYTES # BLD AUTO: 3.12 10*3/MM3 (ref 0.7–3.1)
LYMPHOCYTES NFR BLD AUTO: 35.3 % (ref 19.6–45.3)
MAGNESIUM SERPL-MCNC: 2 MG/DL (ref 1.6–2.4)
MCH RBC QN AUTO: 30.9 PG (ref 26.6–33)
MCHC RBC AUTO-ENTMCNC: 31.5 G/DL (ref 31.5–35.7)
MCV RBC AUTO: 98 FL (ref 79–97)
MONOCYTES # BLD AUTO: 1.17 10*3/MM3 (ref 0.1–0.9)
MONOCYTES NFR BLD AUTO: 13.2 % (ref 5–12)
NEUTROPHILS NFR BLD AUTO: 4.22 10*3/MM3 (ref 1.7–7)
NEUTROPHILS NFR BLD AUTO: 47.7 % (ref 42.7–76)
NRBC BLD AUTO-RTO: 0 /100 WBC (ref 0–0.2)
NT-PROBNP SERPL-MCNC: ABNORMAL PG/ML (ref 0–1800)
PHOSPHATE SERPL-MCNC: 8.7 MG/DL (ref 2.5–4.5)
PLATELET # BLD AUTO: 237 10*3/MM3 (ref 140–450)
PMV BLD AUTO: 10.2 FL (ref 6–12)
POTASSIUM SERPL-SCNC: 5.1 MMOL/L (ref 3.5–5.2)
PROCALCITONIN SERPL-MCNC: 0.57 NG/ML (ref 0–0.25)
PROT SERPL-MCNC: 6.7 G/DL (ref 6–8.5)
RBC # BLD AUTO: 3.56 10*6/MM3 (ref 4.14–5.8)
SODIUM SERPL-SCNC: 140 MMOL/L (ref 136–145)
WBC NRBC COR # BLD AUTO: 8.84 10*3/MM3 (ref 3.4–10.8)

## 2024-09-02 PROCEDURE — 71045 X-RAY EXAM CHEST 1 VIEW: CPT

## 2024-09-02 PROCEDURE — 83605 ASSAY OF LACTIC ACID: CPT | Performed by: FAMILY MEDICINE

## 2024-09-02 PROCEDURE — 25010000002 HEPARIN (PORCINE) PER 1000 UNITS: Performed by: FAMILY MEDICINE

## 2024-09-02 PROCEDURE — 94799 UNLISTED PULMONARY SVC/PX: CPT

## 2024-09-02 PROCEDURE — 85025 COMPLETE CBC W/AUTO DIFF WBC: CPT | Performed by: FAMILY MEDICINE

## 2024-09-02 PROCEDURE — 80053 COMPREHEN METABOLIC PANEL: CPT | Performed by: FAMILY MEDICINE

## 2024-09-02 PROCEDURE — 83735 ASSAY OF MAGNESIUM: CPT | Performed by: FAMILY MEDICINE

## 2024-09-02 PROCEDURE — 93010 ELECTROCARDIOGRAM REPORT: CPT | Performed by: INTERNAL MEDICINE

## 2024-09-02 PROCEDURE — 84100 ASSAY OF PHOSPHORUS: CPT | Performed by: FAMILY MEDICINE

## 2024-09-02 PROCEDURE — 83880 ASSAY OF NATRIURETIC PEPTIDE: CPT | Performed by: FAMILY MEDICINE

## 2024-09-02 PROCEDURE — 93005 ELECTROCARDIOGRAM TRACING: CPT | Performed by: FAMILY MEDICINE

## 2024-09-02 PROCEDURE — 84145 PROCALCITONIN (PCT): CPT | Performed by: FAMILY MEDICINE

## 2024-09-02 PROCEDURE — 99223 1ST HOSP IP/OBS HIGH 75: CPT | Performed by: FAMILY MEDICINE

## 2024-09-02 RX ORDER — BISACODYL 5 MG/1
5 TABLET, DELAYED RELEASE ORAL DAILY PRN
Status: DISCONTINUED | OUTPATIENT
Start: 2024-09-02 | End: 2024-09-10 | Stop reason: HOSPADM

## 2024-09-02 RX ORDER — POLYETHYLENE GLYCOL 3350 17 G/17G
17 POWDER, FOR SOLUTION ORAL DAILY PRN
Status: DISCONTINUED | OUTPATIENT
Start: 2024-09-02 | End: 2024-09-10 | Stop reason: HOSPADM

## 2024-09-02 RX ORDER — AMOXICILLIN 250 MG
2 CAPSULE ORAL 2 TIMES DAILY PRN
Status: DISCONTINUED | OUTPATIENT
Start: 2024-09-02 | End: 2024-09-10 | Stop reason: HOSPADM

## 2024-09-02 RX ORDER — SODIUM CHLORIDE 9 MG/ML
40 INJECTION, SOLUTION INTRAVENOUS AS NEEDED
Status: DISCONTINUED | OUTPATIENT
Start: 2024-09-02 | End: 2024-09-10 | Stop reason: HOSPADM

## 2024-09-02 RX ORDER — BISACODYL 10 MG
10 SUPPOSITORY, RECTAL RECTAL DAILY PRN
Status: DISCONTINUED | OUTPATIENT
Start: 2024-09-02 | End: 2024-09-10 | Stop reason: HOSPADM

## 2024-09-02 RX ORDER — SODIUM CHLORIDE 0.9 % (FLUSH) 0.9 %
10 SYRINGE (ML) INJECTION EVERY 12 HOURS SCHEDULED
Status: DISCONTINUED | OUTPATIENT
Start: 2024-09-02 | End: 2024-09-10 | Stop reason: HOSPADM

## 2024-09-02 RX ORDER — IPRATROPIUM BROMIDE AND ALBUTEROL SULFATE 2.5; .5 MG/3ML; MG/3ML
3 SOLUTION RESPIRATORY (INHALATION)
Status: DISCONTINUED | OUTPATIENT
Start: 2024-09-03 | End: 2024-09-10 | Stop reason: HOSPADM

## 2024-09-02 RX ORDER — HEPARIN SODIUM 5000 [USP'U]/ML
5000 INJECTION, SOLUTION INTRAVENOUS; SUBCUTANEOUS EVERY 12 HOURS SCHEDULED
Status: DISCONTINUED | OUTPATIENT
Start: 2024-09-02 | End: 2024-09-03

## 2024-09-02 RX ORDER — IPRATROPIUM BROMIDE AND ALBUTEROL SULFATE 2.5; .5 MG/3ML; MG/3ML
3 SOLUTION RESPIRATORY (INHALATION) EVERY 4 HOURS PRN
Status: DISCONTINUED | OUTPATIENT
Start: 2024-09-02 | End: 2024-09-10 | Stop reason: HOSPADM

## 2024-09-02 RX ORDER — GUAIFENESIN/DEXTROMETHORPHAN 100-10MG/5
10 SYRUP ORAL EVERY 6 HOURS PRN
Status: DISCONTINUED | OUTPATIENT
Start: 2024-09-02 | End: 2024-09-10 | Stop reason: HOSPADM

## 2024-09-02 RX ORDER — ONDANSETRON 2 MG/ML
4 INJECTION INTRAMUSCULAR; INTRAVENOUS EVERY 6 HOURS PRN
Status: DISCONTINUED | OUTPATIENT
Start: 2024-09-02 | End: 2024-09-10 | Stop reason: HOSPADM

## 2024-09-02 RX ORDER — GUAIFENESIN 600 MG/1
600 TABLET, EXTENDED RELEASE ORAL EVERY 12 HOURS SCHEDULED
Status: DISCONTINUED | OUTPATIENT
Start: 2024-09-02 | End: 2024-09-10 | Stop reason: HOSPADM

## 2024-09-02 RX ORDER — SODIUM CHLORIDE 0.9 % (FLUSH) 0.9 %
10 SYRINGE (ML) INJECTION AS NEEDED
Status: DISCONTINUED | OUTPATIENT
Start: 2024-09-02 | End: 2024-09-10 | Stop reason: HOSPADM

## 2024-09-02 RX ADMIN — Medication 10 ML: at 21:48

## 2024-09-02 RX ADMIN — HEPARIN SODIUM 5000 UNITS: 5000 INJECTION INTRAVENOUS; SUBCUTANEOUS at 21:47

## 2024-09-02 RX ADMIN — GUAIFENESIN 600 MG: 600 TABLET ORAL at 21:47

## 2024-09-02 NOTE — PLAN OF CARE
Pedro Tilley is a 78-year-old gentleman with past medical history of end-stage renal disease on dialysis who presented to [facility] not feeling well.  Patient was found to be COVID-positive.  He is also found to have a new oxygen requirement and bilateral infiltrates on chest x-ray.  There is concern for viral pneumonia versus bacterial pneumonia patient was given ceftriaxone.  He had potassium of 5.6 but all other electrolytes are within normal limits.  Nephrology was consulted and stated the patient could be transferred here as he would not need emergent dialysis.  The patient will be admitted here to the hospital for acute hypoxic respiratory failure, hyperkalemia, and COVID-19.

## 2024-09-03 ENCOUNTER — APPOINTMENT (OUTPATIENT)
Dept: CT IMAGING | Facility: HOSPITAL | Age: 79
End: 2024-09-03
Payer: MEDICARE

## 2024-09-03 PROBLEM — I25.10 CAD (CORONARY ARTERY DISEASE): Status: ACTIVE | Noted: 2024-09-03

## 2024-09-03 PROBLEM — J44.1 COPD WITH ACUTE EXACERBATION: Status: ACTIVE | Noted: 2024-09-03

## 2024-09-03 PROBLEM — N18.6 ESRD (END STAGE RENAL DISEASE): Status: ACTIVE | Noted: 2024-09-03

## 2024-09-03 PROBLEM — E11.9 DM2 (DIABETES MELLITUS, TYPE 2): Status: ACTIVE | Noted: 2024-09-03

## 2024-09-03 PROBLEM — J96.20 ACUTE ON CHRONIC RESPIRATORY FAILURE: Status: ACTIVE | Noted: 2024-09-03

## 2024-09-03 PROBLEM — I10 HTN (HYPERTENSION): Status: ACTIVE | Noted: 2024-09-03

## 2024-09-03 LAB
ATMOSPHERIC PRESS: 747.8 MMHG
BASE EXCESS BLDV CALC-SCNC: -4.6 MMOL/L (ref -2–2)
GLUCOSE BLDC GLUCOMTR-MCNC: 147 MG/DL (ref 70–99)
GLUCOSE BLDC GLUCOMTR-MCNC: 198 MG/DL (ref 70–99)
GLUCOSE BLDC GLUCOMTR-MCNC: 245 MG/DL (ref 70–99)
HCO3 BLDV-SCNC: 23 MMOL/L (ref 22–26)
HGB BLDA-MCNC: 11.3 G/DL (ref 12–18)
INHALED O2 CONCENTRATION: 21 %
L PNEUMO1 AG UR QL IA: NEGATIVE
M PNEUMO IGM SER QL: POSITIVE
MODALITY: ABNORMAL
NOTIFIED WHO: ABNORMAL
PCO2 BLDV: 53.3 MM HG (ref 41–51)
PH BLDV: 7.24 PH UNITS (ref 7.31–7.41)
PO2 BLDV: 44.7 MM HG (ref 35–42)
QT INTERVAL: 445 MS
QTC INTERVAL: 499 MS
S PNEUM AG SPEC QL LA: NEGATIVE
SAO2 % BLDCOV: 71.9 % (ref 45–75)

## 2024-09-03 PROCEDURE — 94799 UNLISTED PULMONARY SVC/PX: CPT

## 2024-09-03 PROCEDURE — 93010 ELECTROCARDIOGRAM REPORT: CPT | Performed by: SPECIALIST

## 2024-09-03 PROCEDURE — 99223 1ST HOSP IP/OBS HIGH 75: CPT | Performed by: INTERNAL MEDICINE

## 2024-09-03 PROCEDURE — 86738 MYCOPLASMA ANTIBODY: CPT | Performed by: FAMILY MEDICINE

## 2024-09-03 PROCEDURE — 82948 REAGENT STRIP/BLOOD GLUCOSE: CPT | Performed by: FAMILY MEDICINE

## 2024-09-03 PROCEDURE — 63710000001 INSULIN LISPRO (HUMAN) PER 5 UNITS: Performed by: FAMILY MEDICINE

## 2024-09-03 PROCEDURE — 94761 N-INVAS EAR/PLS OXIMETRY MLT: CPT

## 2024-09-03 PROCEDURE — 82948 REAGENT STRIP/BLOOD GLUCOSE: CPT

## 2024-09-03 PROCEDURE — 99232 SBSQ HOSP IP/OBS MODERATE 35: CPT | Performed by: FAMILY MEDICINE

## 2024-09-03 PROCEDURE — 82803 BLOOD GASES ANY COMBINATION: CPT

## 2024-09-03 PROCEDURE — 71250 CT THORAX DX C-: CPT

## 2024-09-03 PROCEDURE — 87899 AGENT NOS ASSAY W/OPTIC: CPT | Performed by: FAMILY MEDICINE

## 2024-09-03 PROCEDURE — 93005 ELECTROCARDIOGRAM TRACING: CPT | Performed by: FAMILY MEDICINE

## 2024-09-03 PROCEDURE — 25010000002 ALTEPLASE PER 1 MG: Performed by: INTERNAL MEDICINE

## 2024-09-03 PROCEDURE — 25010000002 CEFTRIAXONE PER 250 MG: Performed by: FAMILY MEDICINE

## 2024-09-03 PROCEDURE — 5A1D70Z PERFORMANCE OF URINARY FILTRATION, INTERMITTENT, LESS THAN 6 HOURS PER DAY: ICD-10-PCS | Performed by: STUDENT IN AN ORGANIZED HEALTH CARE EDUCATION/TRAINING PROGRAM

## 2024-09-03 PROCEDURE — 94660 CPAP INITIATION&MGMT: CPT

## 2024-09-03 PROCEDURE — 25010000002 METHYLPREDNISOLONE PER 40 MG: Performed by: FAMILY MEDICINE

## 2024-09-03 PROCEDURE — 94640 AIRWAY INHALATION TREATMENT: CPT

## 2024-09-03 PROCEDURE — 87449 NOS EACH ORGANISM AG IA: CPT | Performed by: FAMILY MEDICINE

## 2024-09-03 RX ORDER — FENOFIBRATE 160 MG/1
160 TABLET ORAL DAILY
COMMUNITY

## 2024-09-03 RX ORDER — LEVOTHYROXINE SODIUM 125 UG/1
125 TABLET ORAL DAILY
COMMUNITY

## 2024-09-03 RX ORDER — AZITHROMYCIN 250 MG/1
250 TABLET, FILM COATED ORAL EVERY 24 HOURS
Status: COMPLETED | OUTPATIENT
Start: 2024-09-03 | End: 2024-09-06

## 2024-09-03 RX ORDER — GABAPENTIN 100 MG/1
100 CAPSULE ORAL EVERY MORNING
COMMUNITY

## 2024-09-03 RX ORDER — CARVEDILOL 6.25 MG/1
6.25 TABLET ORAL 2 TIMES DAILY WITH MEALS
COMMUNITY

## 2024-09-03 RX ORDER — CETIRIZINE HYDROCHLORIDE 10 MG/1
10 TABLET ORAL DAILY
Status: ON HOLD | COMMUNITY
End: 2024-09-03

## 2024-09-03 RX ORDER — DEXTROSE MONOHYDRATE 25 G/50ML
25 INJECTION, SOLUTION INTRAVENOUS
Status: DISCONTINUED | OUTPATIENT
Start: 2024-09-03 | End: 2024-09-10 | Stop reason: HOSPADM

## 2024-09-03 RX ORDER — PRIMIDONE 50 MG/1
50 TABLET ORAL 3 TIMES WEEKLY
COMMUNITY

## 2024-09-03 RX ORDER — FERROUS SULFATE 325(65) MG
325 TABLET ORAL
Status: DISCONTINUED | OUTPATIENT
Start: 2024-09-03 | End: 2024-09-07

## 2024-09-03 RX ORDER — HYDROCORTISONE 2.5 %
1 CREAM (GRAM) TOPICAL 2 TIMES DAILY
COMMUNITY
End: 2024-09-10 | Stop reason: HOSPADM

## 2024-09-03 RX ORDER — ATORVASTATIN CALCIUM 10 MG/1
10 TABLET, FILM COATED ORAL NIGHTLY
Status: DISCONTINUED | OUTPATIENT
Start: 2024-09-03 | End: 2024-09-10 | Stop reason: HOSPADM

## 2024-09-03 RX ORDER — FERROUS SULFATE 325(65) MG
325 TABLET ORAL
COMMUNITY

## 2024-09-03 RX ORDER — BUDESONIDE 0.5 MG/2ML
0.5 INHALANT ORAL
Status: DISCONTINUED | OUTPATIENT
Start: 2024-09-03 | End: 2024-09-10 | Stop reason: HOSPADM

## 2024-09-03 RX ORDER — NICOTINE POLACRILEX 4 MG
15 LOZENGE BUCCAL
Status: DISCONTINUED | OUTPATIENT
Start: 2024-09-03 | End: 2024-09-10 | Stop reason: HOSPADM

## 2024-09-03 RX ORDER — CALCIUM ACETATE 667 MG/1
1334 CAPSULE ORAL 3 TIMES DAILY
COMMUNITY

## 2024-09-03 RX ORDER — MUPIROCIN 20 MG/G
1 OINTMENT TOPICAL 3 TIMES DAILY
COMMUNITY

## 2024-09-03 RX ORDER — GABAPENTIN 100 MG/1
100 CAPSULE ORAL 3 TIMES DAILY
Status: DISCONTINUED | OUTPATIENT
Start: 2024-09-03 | End: 2024-09-04

## 2024-09-03 RX ORDER — GABAPENTIN 300 MG/1
300 CAPSULE ORAL NIGHTLY
COMMUNITY

## 2024-09-03 RX ORDER — LEVOTHYROXINE SODIUM 125 UG/1
125 TABLET ORAL
Status: DISCONTINUED | OUTPATIENT
Start: 2024-09-03 | End: 2024-09-10 | Stop reason: HOSPADM

## 2024-09-03 RX ORDER — METOCLOPRAMIDE 5 MG/1
5 TABLET ORAL 2 TIMES DAILY
COMMUNITY

## 2024-09-03 RX ORDER — PANTOPRAZOLE SODIUM 40 MG/1
40 TABLET, DELAYED RELEASE ORAL DAILY
Status: DISCONTINUED | OUTPATIENT
Start: 2024-09-03 | End: 2024-09-10 | Stop reason: HOSPADM

## 2024-09-03 RX ORDER — ARFORMOTEROL TARTRATE 15 UG/2ML
15 SOLUTION RESPIRATORY (INHALATION)
Status: DISCONTINUED | OUTPATIENT
Start: 2024-09-03 | End: 2024-09-10 | Stop reason: HOSPADM

## 2024-09-03 RX ORDER — IBUPROFEN 600 MG/1
1 TABLET ORAL
Status: DISCONTINUED | OUTPATIENT
Start: 2024-09-03 | End: 2024-09-10 | Stop reason: HOSPADM

## 2024-09-03 RX ORDER — BUDESONIDE, GLYCOPYRROLATE, AND FORMOTEROL FUMARATE 160; 9; 4.8 UG/1; UG/1; UG/1
2 AEROSOL, METERED RESPIRATORY (INHALATION) 2 TIMES DAILY
COMMUNITY

## 2024-09-03 RX ORDER — ALLOPURINOL 100 MG/1
100 TABLET ORAL DAILY
COMMUNITY

## 2024-09-03 RX ORDER — LORATADINE 10 MG/1
10 TABLET ORAL DAILY
COMMUNITY

## 2024-09-03 RX ORDER — MIDODRINE HYDROCHLORIDE 5 MG/1
5 TABLET ORAL
COMMUNITY

## 2024-09-03 RX ORDER — ERGOCALCIFEROL 1.25 MG/1
50000 CAPSULE, LIQUID FILLED ORAL WEEKLY
COMMUNITY

## 2024-09-03 RX ORDER — BUMETANIDE 2 MG/1
2 TABLET ORAL 2 TIMES DAILY
COMMUNITY

## 2024-09-03 RX ORDER — PANTOPRAZOLE SODIUM 40 MG/1
40 TABLET, DELAYED RELEASE ORAL DAILY
COMMUNITY

## 2024-09-03 RX ORDER — ATORVASTATIN CALCIUM 10 MG/1
10 TABLET, FILM COATED ORAL NIGHTLY
COMMUNITY

## 2024-09-03 RX ORDER — INSULIN LISPRO 100 [IU]/ML
2-7 INJECTION, SOLUTION INTRAVENOUS; SUBCUTANEOUS
Status: DISCONTINUED | OUTPATIENT
Start: 2024-09-03 | End: 2024-09-10 | Stop reason: HOSPADM

## 2024-09-03 RX ADMIN — GUAIFENESIN 600 MG: 600 TABLET ORAL at 21:59

## 2024-09-03 RX ADMIN — FERROUS SULFATE TAB 325 MG (65 MG ELEMENTAL FE) 325 MG: 325 (65 FE) TAB at 10:00

## 2024-09-03 RX ADMIN — Medication 10 ML: at 10:01

## 2024-09-03 RX ADMIN — APIXABAN 2.5 MG: 2.5 TABLET, FILM COATED ORAL at 10:00

## 2024-09-03 RX ADMIN — Medication 1 APPLICATION: at 22:04

## 2024-09-03 RX ADMIN — WATER 40 MG: 1 INJECTION INTRAMUSCULAR; INTRAVENOUS; SUBCUTANEOUS at 17:23

## 2024-09-03 RX ADMIN — CEFTRIAXONE SODIUM 1000 MG: 1 INJECTION, POWDER, FOR SOLUTION INTRAMUSCULAR; INTRAVENOUS at 12:36

## 2024-09-03 RX ADMIN — AZITHROMYCIN DIHYDRATE 250 MG: 250 TABLET ORAL at 12:37

## 2024-09-03 RX ADMIN — Medication: at 14:47

## 2024-09-03 RX ADMIN — PANTOPRAZOLE SODIUM 40 MG: 40 TABLET, DELAYED RELEASE ORAL at 10:00

## 2024-09-03 RX ADMIN — IPRATROPIUM BROMIDE AND ALBUTEROL SULFATE 3 ML: .5; 3 SOLUTION RESPIRATORY (INHALATION) at 06:53

## 2024-09-03 RX ADMIN — ARFORMOTEROL TARTRATE 15 MCG: 15 SOLUTION RESPIRATORY (INHALATION) at 06:53

## 2024-09-03 RX ADMIN — CARVEDILOL 9.38 MG: 6.25 TABLET, FILM COATED ORAL at 17:24

## 2024-09-03 RX ADMIN — BUDESONIDE 0.5 MG: 0.5 SUSPENSION RESPIRATORY (INHALATION) at 06:53

## 2024-09-03 RX ADMIN — WATER 40 MG: 1 INJECTION INTRAMUSCULAR; INTRAVENOUS; SUBCUTANEOUS at 03:39

## 2024-09-03 RX ADMIN — INSULIN LISPRO 2 UNITS: 100 INJECTION, SOLUTION INTRAVENOUS; SUBCUTANEOUS at 12:36

## 2024-09-03 RX ADMIN — INSULIN LISPRO 3 UNITS: 100 INJECTION, SOLUTION INTRAVENOUS; SUBCUTANEOUS at 22:03

## 2024-09-03 RX ADMIN — ATORVASTATIN CALCIUM 10 MG: 10 TABLET, FILM COATED ORAL at 21:59

## 2024-09-03 RX ADMIN — LEVOTHYROXINE SODIUM 125 MCG: 125 TABLET ORAL at 10:00

## 2024-09-03 RX ADMIN — GABAPENTIN 100 MG: 100 CAPSULE ORAL at 21:59

## 2024-09-03 RX ADMIN — GABAPENTIN 100 MG: 100 CAPSULE ORAL at 17:23

## 2024-09-03 RX ADMIN — APIXABAN 2.5 MG: 2.5 TABLET, FILM COATED ORAL at 21:59

## 2024-09-03 RX ADMIN — IPRATROPIUM BROMIDE AND ALBUTEROL SULFATE 3 ML: .5; 3 SOLUTION RESPIRATORY (INHALATION) at 00:07

## 2024-09-03 RX ADMIN — GUAIFENESIN 600 MG: 600 TABLET ORAL at 10:00

## 2024-09-03 RX ADMIN — GABAPENTIN 100 MG: 100 CAPSULE ORAL at 12:37

## 2024-09-03 RX ADMIN — INSULIN LISPRO 2 UNITS: 100 INJECTION, SOLUTION INTRAVENOUS; SUBCUTANEOUS at 17:23

## 2024-09-03 RX ADMIN — Medication 10 ML: at 21:59

## 2024-09-03 NOTE — PROGRESS NOTES
RT EQUIPMENT DEVICE RELATED - SKIN ASSESSMENT    RT Medical Equipment/Device:     NIV Mask:  Under-the-nose   size:  B    Skin Assessment:      MOUTh, CHEEKS, NOSE:  Intact    Device Skin Pressure Protection:  Positioning supports utilized    Nurse Notification:  Ernestina Matthews, RRT

## 2024-09-03 NOTE — CONSULTS
Pulmonary / Critical Care Consult Note      Patient Name: Pedro Tilley  : 1945  MRN: [id number]  Primary Care Physician:  Yessica Sr, WENDY  Referring Physician: Yessica Sr,*  Date of admission: 2024    Subjective   Subjective     Reason for Consult/ Chief Complaint: Acute on chronic COPD exacerbation, COVID-19 respiratory infection    HPI:  Pedro Tilley is a 78 y.o. male past medical history of COPD with as needed oxygen, CHF, ESRD on dialysis, and CAD presented to the ED from [facility] facility for worsening shortness of breath and fatigue with a productive cough.  While at [facility] patient tested positive for COVID-19 and CXR demonstrated possible pneumonia.  In the ED, proBNP was 30,538, and procalcitonin was 0.57.   CXR demonstrating stable cardiac enlargement with chronic changes in the lung bases.  Chest CT was ordered.  The service was consulted to assist in management treatment of the patient's acute issues.  Upon assessment, patient was returning from CT while on 2 L nasal cannula in no apparent respiratory distress.  Findings and plan were discussed with the patient.      Personal History     Past Medical History:   Diagnosis Date    Arthritis     Cancer     Coronary artery disease     Diabetes mellitus     Dialysis patient     History of transfusion     Hypertension     Kidney disease        History reviewed. No pertinent surgical history.    Family History: No known family history of chronic lung or heart disease.     Social History:  reports that he has been smoking cigarettes. He started smoking about 65 years ago. He has a 65 pack-year smoking history. He does not have any smokeless tobacco history on file. He reports that he does not drink alcohol and does not use drugs.    Home Medications:  Insulin Degludec, SITagliptin, allopurinol, apixaban, atorvastatin, bumetanide, calcium acetate, carvedilol, cetirizine, fenofibrate, ferrous sulfate, gabapentin,  hydrocortisone, levothyroxine, pantoprazole, primidone, and sodium zirconium cyclosilicate    Allergies:  Allergies   Allergen Reactions    Ketamine Anaphylaxis       Objective    Objective     Vitals:   Temp:  [97.2 °F (36.2 °C)-98.7 °F (37.1 °C)] 97.2 °F (36.2 °C)  Heart Rate:  [75-87] 87  Resp:  [16-20] 20  BP: ()/(46-74) 129/46  Flow (L/min):  [1-2] 1    Physical Exam:  Vital Signs Reviewed   General:  WDWN, Alert, NAD.  Chronically ill-appearing male, in wheelchair  HEENT:  PERRL, EOMI.  OP, nares clear, no sinus tenderness  Neck:  Supple, no JVD, no thyromegaly  Lymph: no axillary, cervical, supraclavicular lymphadenopathy noted bilaterally  Chest: Good aeration with scattered rhonchi and Velcro-like crackles, no wheezing noted, on 2 L  CV: RRR, no MGR, pulses 2+, equal.  Abd:  Soft, NT, ND, + BS, no HSM, obese  EXT:  no clubbing, no cyanosis, no edema, no joint tenderness  Neuro:  A&Ox3, CN grossly intact, no focal deficits.  Skin: No rashes or lesions noted      Result Review    Result Review:  I have personally reviewed the results from the time of this admission to 9/3/2024 08:09 EDT and agree with these findings:  [x]  Laboratory  [x]  Microbiology  [x]  Radiology  []  EKG/Telemetry   []  Cardiology/Vascular   []  Pathology  []  Old records  []  Other:  Most notable findings include:         Lab 09/02/24  2132 08/28/24  0000   WBC 8.84  --    HEMOGLOBIN 11.0* 11.0*  33.0*   HEMATOCRIT 34.9*  --    PLATELETS 237  --    SODIUM 140  --    POTASSIUM 5.1  --    CHLORIDE 100  --    CO2 23.2  --    BUN 66*  --    CREATININE 9.88*  --    GLUCOSE 114*  --    CALCIUM 7.8*  --    PHOSPHORUS 8.7*  --    TOTAL PROTEIN 6.7  --    ALBUMIN 2.8*  --    GLOBULIN 3.9  --      CT Chest Without Contrast Diagnostic    Result Date: 9/3/2024  CT CHEST WO CONTRAST DIAGNOSTIC Date of Exam: 9/3/2024 9:00 AM EDT Indication: hypercapnea with acute hypoxemia. Comparison: 2/20/2020 Technique: Axial CT images were obtained of the  chest without contrast administration.  Reconstructed coronal and sagittal images were also obtained. Automated exposure control and iterative construction methods were used. Findings: Cardiomegaly is present. There is minimal pericardial fluid. There are small pleural effusions. There is interlobular septal thickening in the lower lung fields consistent with pulmonary edema. There is mild patchy airspace consolidation likely atelectasis. Pneumonia is also possible. There are scattered calcified granulomas. There is no mediastinal, axillary or hilar adenopathy. There is a left-sided cardiac pacemaker/AICD. There is a right IJ central venous catheter. Gallstones are present in  the gallbladder. The left kidney is not visualized and may be surgically absent. Surgical clips are present in the left upper quadrant. There are a few hypodense masses in the visualized right kidney measuring up to 2 cm. They may represent cysts. They are indeterminate without contrast. There is bilateral gynecomastia. Degenerative changes are present in the thoracic spine. There is a 4 cm duodenal diverticulum. There is mild stranding around the pancreas.     Impression: Impression: 1. Findings suggest congestive heart failure with small pleural effusions. Mild airspace consolidation in the lower lung fields is likely atelectasis. Superimposed pneumonia is also possible. 2. Cholelithiasis. 3. Mild stranding around the pancreas. Suggest correlation with any history of acute pancreatitis. 4. Hypodense right renal masses measuring up to 2 cm possibly representing cysts. They're indeterminate without contrast. 5. 4 cm duodenal diverticulum. Electronically Signed: Matthew David MD  9/3/2024 11:06 AM EDT  Workstation ID: [id number]       Assessment & Plan   Assessment / Plan     Active Hospital Problems:  Active Hospital Problems    Diagnosis     **COPD with acute exacerbation     Acute on chronic respiratory failure     ESRD (end stage renal  disease)     HTN (hypertension)     DM2 (diabetes mellitus, type 2)     CAD (coronary artery disease)     Hyperkalemia      Impression:  Acute on chronic hypoxic respiratory failure requiring supplemental oxygen  Acute on chronic COPD exacerbation  COVID-19 respiratory tract infection  ESRD on dialysis  Elevated proBNP, 30,538  Question congestive diastolic heart failure  A-fib on Eliquis    -Currently on 1 liter nasal cannula.  Continue supplemental oxygen maintain CO2 greater than 90%  -9/2 CXR with chronic changes in the lung bases.  Chest CT without contrast pending  -Continue azithromycin and ceftriaxone for pneumonia  -Strep pneumo, Legionella, respiratory sputum culture pending  -Continue Solu-Medrol 40 mg IV twice daily  -Continue Brovana, Pulmicort, DuoNebs  -Continue bronchopulmonary hygiene.  Encourage I-S and flutter  -Encourage activity as tolerated  -Continue Eliquis for A-fib  -PT/OT on board.  Appreciate assistance  -Pulmonary rehab consulted      Labs, managing, and medications personally reviewed  Discussed with primary and patient    Electronically signed by WENDY Vaughan, 09/03/24, 10:54 AM EDT.    This visit was performed by BOTH a physician and an APC. I personally evaluated and examined the patient. I performed all aspects of MDM as documented. , I have reviewed and confirmed the accuracy of the patient's history as documented in this note., and I have reexamined the patient and the results are consistent with the previously documented exam. I have updated the documentation as necessary.     Electronically signed by Sagar Nicole MD, 09/03/24, 2:11 PM EDT.       Electronically signed by Sagar Nicole MD, 9/3/2024, 08:09 EDT.

## 2024-09-03 NOTE — PLAN OF CARE
Goal Outcome Evaluation:  Plan of Care Reviewed With: patient        Progress: no change  Outcome Evaluation: Pt is AO x4, able to make needs known. Pt is currently on 1L NC, no signs of respiratory distress noted. Blood glucose monitored during shift, treated per MAR.No complaints of pain or discomfort at this time. No acute changes throughout shift. Continue with current plan of care.

## 2024-09-03 NOTE — PLAN OF CARE
Goal Outcome Evaluation:         A/O, pt on 2LNC, pt has had expiratory wheezing, pt is paced. Pt had one hour of diaylsis Friday and missed Monday due to not feeling good. Will continue plan of care.

## 2024-09-03 NOTE — PROGRESS NOTES
[facility]   Hospitalist Progress Note  Date: 9/3/2024  Patient Name: Pedro Tilley  : 1945  MRN: [id number]  Date of admission: 2024      Subjective   Subjective     Chief complaint: Shortness of breath, generalized weakness    Summary:  78-year-old male with end-stage renal disease on hemodialysis, diabetes, hypertension, CAD, COPD, GERD without esophagitis, chronic diastolic heart failure, hospitalized on 2024 as a transfer from an outside facility for weakness and shortness of breath/chief complaint, he was placed on antibiotics after chest x-ray showed pneumonia and he is found to be COVID-19 positive, he was hypoxemic upon transfer to our facility, he tested positive for mycoplasma, started on azithromycin, steroids, pulmonary consulted, nephrology consulted.    Interval follow-up: Seen and examined this morning, no acute distress, no acute medronate events, breathing better after transfer to our facility, found to have mycoplasma IgM positivity in addition to testing positive for SARS-CoV-2 at the outside facility.  On steroids, supplemental oxygen, BNP elevated at 30,000, creatinine 9.88, bicarb 23, potassium 5.1, white blood cell count 8000.  He may worsen with underlying COVID and mycoplasma before he gets better.  Telemetry reviewed, no acute major rhythmic events.  Ventricular paced rhythm at 75 bpm.    Review of systems:  All systems reviewed and negative except for weakness, fatigue, cough, shortness of breath    Objective   Objective     Vitals:   Temp:  [96.6 °F (35.9 °C)-98.7 °F (37.1 °C)] 96.6 °F (35.9 °C)  Heart Rate:  [75-87] 75  Resp:  [16-20] 20  BP: ()/(46-74) 117/51  Flow (L/min):  [1-2] 1  Physical Exam               Constitutional: Awake, alert, ill-appearing              Eyes: PERRLA, sclerae anicteric, no conjunctival injection              HENT: NCAT, mucous membranes moist              Neck: Supple, full range of motion              Respiratory: Bilateral  wheezing, rhonchi, no tachypnea, on nasal cannula              Cardiovascular: RRR, no murmurs, rubs, or gallops, palpable pedal pulses bilaterally              Gastrointestinal: Positive bowel sounds, soft, nontender, nondistended              Musculoskeletal: Bilateral lower extremity edema, no clubbing or cyanosis to extremities              Psychiatric: Appropriate affect, cooperative              Neurologic: Oriented x 3, strength symmetric in all extremities, Cranial Nerves grossly intact to confrontation, speech mumbles but understandable              Skin: No rashes     Result Review    Result Review:  I have personally reviewed the pertinent results from the past 24 hours to 9/3/2024 15:18 EDT and agree with these findings:  [x]  Laboratory   CBC          8/21/2024    00:00 8/28/2024    00:00 9/2/2024    21:32   CBC   WBC   8.84    RBC   3.56    Hemoglobin 11.3        33.9     11.0        33.0     11.0    Hematocrit   34.9    MCV   98.0    MCH   30.9    MCHC   31.5    RDW   15.9    Platelets   237       Details          This result is from an external source.             BMP          9/2/2024    21:32   BMP   BUN 66    Creatinine 9.88    Sodium 140    Potassium 5.1    Chloride 100    CO2 23.2    Calcium 7.8      LIVER FUNCTION TESTS:      Lab 09/02/24  2132   TOTAL PROTEIN 6.7   ALBUMIN 2.8*   GLOBULIN 3.9   ALT (SGPT) 5   AST (SGOT) 25   BILIRUBIN 0.5   ALK PHOS 119*       [x]  Microbiology   Microbiology Results (last 10 days)       Procedure Component Value - Date/Time    S. Pneumo Ag Urine or CSF - Urine, Urine, Clean Catch [[id number]]  (Normal) Collected: 09/03/24 1207    Lab Status: Final result Specimen: Urine, Clean Catch Updated: 09/03/24 1302     Strep Pneumo Ag Negative    Legionella Antigen, Urine - Urine, Urine, Clean Catch [[id number]]  (Normal) Collected: 09/03/24 1207    Lab Status: Final result Specimen: Urine, Clean Catch Updated: 09/03/24 1302     LEGIONELLA ANTIGEN, URINE Negative     Mycoplasma Pneumoniae Antibody, IgM - Blood, Arm, Left [[id number]]  (Abnormal) Collected: 09/03/24 0806    Lab Status: Final result Specimen: Blood from Arm, Left Updated: 09/03/24 0914     Mycoplasma pneumo IgM Positive              [x]  Radiology CT Chest Without Contrast Diagnostic    Result Date: 9/3/2024  Impression: 1. Findings suggest congestive heart failure with small pleural effusions. Mild airspace consolidation in the lower lung fields is likely atelectasis. Superimposed pneumonia is also possible. 2. Cholelithiasis. 3. Mild stranding around the pancreas. Suggest correlation with any history of acute pancreatitis. 4. Hypodense right renal masses measuring up to 2 cm possibly representing cysts. They're indeterminate without contrast. 5. 4 cm duodenal diverticulum. Electronically Signed: Matthew David MD  9/3/2024 11:06 AM EDT  Workstation ID: [id number]    XR Chest 1 View    Result Date: 9/2/2024  Stable cardiac enlargement with chronic changes in the lung bases. Electronically Signed: Lam Reed MD  9/2/2024 9:34 PM EDT  Workstation ID: [id number]       [x]  EKG/Telemetry   ECG 12 Lead Electrolyte Imbalance   Preliminary Result   HEART RATE=75  bpm   RR Nstqfrey=163  ms   UT Interval=  ms   P Horizontal Axis=  deg   P Front Axis=  deg   QRSD Kjjvwtvl=013  ms   QT Uluimvlg=109  ms   WBgR=921  ms   QRS Axis=244  deg   T Wave Axis=57  deg   - ABNORMAL ECG -   Afib/flutter and ventricular-paced rhythm   Biventricular paced rhythm   No further analysis attempted due to paced rhythm   Date and Time of Study:2024-09-02 21:33:59          []  Cardiology/Vascular   []  Pathology  [x]  Old records  []  Other:    Assessment & Plan   Assessment / Plan     Assessment/Plan:  Assessment:  Acute hypoxemic respiratory failure  COPD with acute exacerbation    SARS-CoV-2 infection  COVID-19 causing lower respiratory tract infection  Mycoplasma pneumonia  Acute on chronic respiratory failure    ESRD (end stage renal  disease)    Paroxysmal atrial fibrillation  HTN (hypertension)    DM2 (diabetes mellitus, type 2)    CAD (coronary artery disease)    Hyperkalemia      Plan:  Labs and imaging reviewed  Consulted pulmonary discussed with Dr. Nicole, recommendations appreciated  Start azithromycin for treatment for mycoplasma pneumonia  Continue ceftriaxone  Continue Solu-Medrol 40 mg IV twice daily  Start Brovana Pulmicort nebs twice daily  Coreg resumed at higher dose at 9.375 mg p.o. twice daily  Continue Lipitor 10 mg nightly  Continue reduced dose gabapentin at 100 mg 3 times daily  Continue Protonix 40 mg daily  Wean oxygen per tolerance  Nephrology consulted for dialysis orders  PT/OT  AM labs  Full code  DVT prophylaxis Eliquis  Clinical course to dictate further management  Discussed with nurse at the bedside      VTE Prophylaxis:  Pharmacologic VTE prophylaxis orders are present.        CODE STATUS:   Level Of Support Discussed With: Patient  Code Status (Patient has no pulse and is not breathing): CPR (Attempt to Resuscitate)  Medical Interventions (Patient has pulse or is breathing): Full Support        Electronically signed by Kana Tavares MD, 9/3/2024, 15:18 EDT.    Portions of this documentation were transcribed electronically from a voice recognition software.  I confirm all data accurately represents the service(s) I performed at today's visit.

## 2024-09-03 NOTE — H&P
[facility]   HISTORY AND PHYSICAL    Patient Name: Pedro Tilley  : 1945  MRN: [id number]  Primary Care Physician:  Yessica Sr, WENDY  Date of admission: 2024    Subjective   Subjective     Chief Complaint: Generalized weakness, shortness of breath    HPI:    Pedro Tilley is a 78 y.o. male with past medical history of end-stage renal disease on hemodialysis, diabetes, hypertension, hyperlipidemia, CAD, COPD on as needed oxygen, CHF, obesity, and GERD was transferred to this facility from [facility] for management of his end-stage renal disease.  Patient presented to outside facility with complaints of feeling weak for the previous 3 days along with worsening shortness of breath, a productive cough, lightheadedness, and decreased appetite.  Patient went to his hemodialysis session on Friday but due to feeling so ill and weak only 1 hour was done.  Patient symptoms continue to worsen so he went to the ED for further evaluation.  While there he was found to be COVID-19 positive with chest x-ray showing possible pneumonia and thus started on antibiotics.  Due to lack of nephrology coverage patient was transferred here for further management and hemodialysis.  At our facility patient was slightly hypoxic on arrival with significant wheezing and tachypnea.  ABG showed he was in hypercapnic respiratory failure with labs showing significantly elevated proBNP, procalcitonin, and anemia.  Lactic acid was within normal limits.  Chest ray showed stable chronic changes.  When seen patient was resting comfortably and denied any recent fevers, headaches, focal weakness, abdominal pain, nausea, vomiting, diarrhea, constipation, dysuria, hematuria, hematochezia, melena, or anxiety.  Patient admitted for further evaluation and treatment    Review of Systems   All systems were reviewed and negative except for: As per HPI    Personal History     Past Medical History:   Diagnosis Date    Arthritis      Cancer     Coronary artery disease     Diabetes mellitus     Dialysis patient     History of transfusion     Hypertension     Kidney disease        History reviewed. No pertinent surgical history.    Family History: family history is not on file. Otherwise pertinent FHx was reviewed and not pertinent to current issue.    Social History:  reports that he has been smoking cigarettes. He started smoking about 65 years ago. He has a 65 pack-year smoking history. He does not have any smokeless tobacco history on file. He reports that he does not drink alcohol and does not use drugs.    Home Medications:         Allergies:  Allergies   Allergen Reactions    Ketamine Anaphylaxis       Objective   Objective     Vitals:   Temp:  [97.5 °F (36.4 °C)-98.7 °F (37.1 °C)] 97.5 °F (36.4 °C)  Heart Rate:  [75] 75  Resp:  [18-20] 18  BP: ()/(57-74) 91/74  Flow (L/min):  [2] 2  Physical Exam    Constitutional: Awake, alert, ill-appearing   Eyes: PERRLA, sclerae anicteric, no conjunctival injection   HENT: NCAT, mucous membranes moist   Neck: Supple, no thyromegaly, no lymphadenopathy, trachea midline   Respiratory: Bilateral wheezing, rhonchi, no tachypnea, on nasal cannula   Cardiovascular: RRR, no murmurs, rubs, or gallops, palpable pedal pulses bilaterally   Gastrointestinal: Positive bowel sounds, soft, nontender, nondistended   Musculoskeletal: Bilateral lower extremity edema  , no clubbing or cyanosis to extremities   Psychiatric: Appropriate affect, cooperative   Neurologic: Oriented x 3, strength symmetric in all extremities, Cranial Nerves grossly intact to confrontation, speech clear   Skin: No rashes     Result Review    Result Review:  I have personally reviewed the results from the time of this admission to 9/2/2024 22:09 EDT and agree with these findings:  [x]  Laboratory list / accordion  []  Microbiology  [x]  Radiology  [x]  EKG/Telemetry   []  Cardiology/Vascular   []  Pathology  []  Old records  []   Other:  Most notable findings include: Hypercapnic respiratory failure, end-stage renal disease, elevated troponin, anemia, positive COVID-19, chest x-ray unremarkable      Assessment & Plan   Assessment / Plan     Brief Patient Summary:  Pedro Tilley is a 78 y.o. male with past medical history of end-stage renal disease on hemodialysis, diabetes, hypertension, hyperlipidemia, CAD, COPD on as needed oxygen, CHF, obesity, and GERD was transferred to this facility from [facility] for management of his end-stage renal disease.    Active Hospital Problems:  Active Hospital Problems    Diagnosis     **COPD with acute exacerbation     Acute on chronic respiratory failure     ESRD (end stage renal disease)     HTN (hypertension)     DM2 (diabetes mellitus, type 2)     CAD (coronary artery disease)     Hyperkalemia      Plan:     COPD Exacerbation  -Admit to telemetry  -Patient COVID-19 positive  -Imaging reviewed  -Supplemental oxygen as needed, wean down as tolerated  -ABG reviewed  -BiPAP  -IV Solu-Medrol  -DuoNeb 3 times daily and as needed  -Empiric antibiotics  -Mucinex, Tessalon Perles  -Incentive spirometry  -Consult pulmonology if warranted  -Supportive care    End-stage renal disease  -Patient with missed dialysis sessions  -Nephrology consulted  -Dialysis per nephrology    Diabetes  -Insulin sliding scale  -Titrate as needed    HTN  -Currently well controlled  -PRN BP meds  -Resume home meds when available  -Titrate if needed    Hx CAD  Hx CHF    GI ppx  DVT ppx    VTE Prophylaxis:  Pharmacologic VTE prophylaxis orders are present.        CODE STATUS:    Level Of Support Discussed With: Patient  Code Status (Patient has no pulse and is not breathing): CPR (Attempt to Resuscitate)  Medical Interventions (Patient has pulse or is breathing): Full Support    Admission Status:  I believe this patient meets inpatient status.      Electronically signed by Gera Pacheco MD, 09/02/24, 10:09 PM EDT.

## 2024-09-03 NOTE — PROGRESS NOTES
RT EQUIPMENT DEVICE RELATED - SKIN ASSESSMENT    RT Medical Equipment/Device:     NIV Mask:  Under-the-nose   size:       Skin Assessment:      Cheek:  Intact  Chin:  Intact  Nares:  Intact  Nose:  Intact    Device Skin Pressure Protection:  Positioning supports utilized    Nurse Notification:  Ernestina Lu, RRT

## 2024-09-03 NOTE — PROGRESS NOTES
Respiratory Therapist Broncho-Pulmonary Hygiene Progress Note      Patient Name:  Pedro Tilley  YOB: 1945    Pedro Tilley meets the qualification for Level 2 of the Bronco-Pulmonary Hygiene Protocol. This was based on my daily patient assessment and includes review of chest x-ray results, cough ability and quality, oxygenation, secretions or risk for secretion development and patient mobility.     Broncho-Pulmonary Hygiene Assessment:    Level of Movement: Actively changing positions without assistance  Alert/ oriented/ cooperative    Breath Sounds: Diminished and/or coarse rhonchi    Cough: Strong, effective    Chest X-Ray: Possible signs of consolidation and/or atelectasis or clear.     Sputum Productions: None or small amount of thin or watery secretions with effective cough    History and Physical: None    SpO2 to Oxygen Need: greater than 92% on room air or  less than 3L nasal canula    Current SpO2 is: 97% on 1L nasal cannula     Based on this information, I have completed the following interventions: Aerobika with bronchodialtor medication or TID      Electronically signed by Mary Lu RRT, 09/03/24, 7:03 AM EDT.

## 2024-09-03 NOTE — SIGNIFICANT NOTE
09/03/24 1101   OTHER   Discipline occupational therapist   Rehab Time/Intention   Session Not Performed patient unavailable for evaluation  (at dialysis)   Therapy Assessment/Plan (PT)   Criteria for Skilled Interventions Met (PT) yes;meets criteria;skilled treatment is necessary

## 2024-09-03 NOTE — SIGNIFICANT NOTE
Wound Eval / Progress Noted    RENEE Coleman     Patient Name: Pedro Tilley  : 1945  MRN: [id number]  Today's Date: 9/3/2024                 Admit Date: 2024    Visit Dx:  No diagnosis found.      COPD with acute exacerbation    Hyperkalemia    Acute on chronic respiratory failure    ESRD (end stage renal disease)    HTN (hypertension)    DM2 (diabetes mellitus, type 2)    CAD (coronary artery disease)        Past Medical History:   Diagnosis Date    Arthritis     Cancer     Coronary artery disease     Diabetes mellitus     Dialysis patient     History of transfusion     Hypertension     Kidney disease         History reviewed. No pertinent surgical history.      Physical Assessment:  Wound 24 Right upper arm Other (comment) (Active)   Wound Image   24 1120   Dressing Appearance open to air 24 1120   Closure Glue 24 1120   Base closed/resurfaced;dry;other (see comments) 24 1120   Periwound dry;redness;ecchymosis 24 1120   Periwound Temperature warm 24 1120   Periwound Skin Turgor soft 24 1120   Edges rolled/closed 24 1120   Drainage Amount none 24 1120   Dressing Care open to air 24 1120   Periwound Care dry periwound area maintained 24 1120       Wound 24 2136 Right lower arm Skin Tear (Active)   Wound Image   24 1120   Pressure Injury Stage O 24 1120   Dressing Appearance dry;intact 24 1120   Closure None 24 1120   Base moist;red 24 1120   Red (%), Wound Tissue Color 100 24 1120   Periwound dry;ecchymotic 24 1120   Periwound Temperature warm 24 1120   Periwound Skin Turgor soft 24 1120   Edges rolled/closed 24 1120   Drainage Characteristics/Odor serosanguineous 24 1120   Drainage Amount scant 24 1120   Care, Wound cleansed with;sterile normal saline 24 1120   Dressing Care dressing removed;dressing  applied;non-adherent;petroleum-based;gauze;silicone;border dressing 09/03/24 1120   Periwound Care absorptive dressing applied 09/03/24 1120       Wound 09/03/24 1120 Right upper arm Skin Tear (Active)   Wound Image   09/03/24 1120   Dressing Appearance dry;intact 09/03/24 1120   Closure None 09/03/24 1120   Base moist;red 09/03/24 1120   Periwound dry;ecchymotic 09/03/24 1120   Periwound Temperature warm 09/03/24 1120   Periwound Skin Turgor soft 09/03/24 1120   Edges open 09/03/24 1120   Drainage Characteristics/Odor serosanguineous 09/03/24 1120   Drainage Amount scant 09/03/24 1120   Care, Wound cleansed with;sterile normal saline 09/03/24 1120   Dressing Care dressing removed;dressing applied;non-adherent;petroleum-based;gauze;silicone;border dressing 09/03/24 1120   Periwound Care absorptive dressing applied 09/03/24 1120      09/03/24 1301   Skin   Skin WDL X;all   Skin Color/Characteristics other (see comments)  (scattered ecchymosis)   Skin Temperature warm   Skin Moisture dry   Skin Integrity scab  (scattered scabs)    Right heel    Left foot toes      Wound Check / Follow-up: Seen today for wound consult.  Patient is awake, alert, and oriented.  Patient reports his right upper arm incision with placement of a dialysis fistula/shunt and that is to be left open to air.  Patient states he often gets bumps and scrapes and that he bruises and develops openings to his skin easily.    Right upper arm incision is closed with glue.  Dry, red crusted tissue noted intermittently to incision line.  Periwound tissue is dry with redness and ecchymosis noted.  Will defer site care to surgeon.    Right lower arm with a category 1 skin tear.  Right posterior upper arm with category 2 skin tear.  Skin tear wound bases are noted be moist and red.  Periwound tissue is noted to be dry and ecchymotic.  Cleansed with normal saline and gauze, blotted dry.  Recommending every other day dressing changes with nonadherent  petroleum-based gauze and silicone border dressing.    Scattered ecchymosis noted to extremities.  Nonblanchable ecchymosis noted to right heel, cannot rule out pressure.  Scattered scabs noted to anterior aspect of toes on left foot.  Cleansed with normal saline and gauze, blotted dry.  Recommending quality skin care and hygiene with application of Aveeno moisturizer 2 times a day.    Gluteal aspects are noted to have blanchable redness and intact tissue.  Recommending quality skin care and hygiene with application of blue top moisture barrier 3 times a day and as needed for incontinence.  Implement every 2 hour turns and offload at all times.  Keep patient clean, dry, and free from moisture.    Impression: Right upper arm incision closed with glue, right lower arm with category 1 skin tear, right posterior upper arm with category 2 skin tear, scattered bruising and scabbing, blanchable redness to gluteal aspects.    Short term goals: Regain skin integrity, skin protection, moisture prevention, quality skin care and hygiene, every other day dressing changes, pressure reduction.    Jenny Schmidt RN    9/3/2024    18:21 EDT

## 2024-09-03 NOTE — CONSULTS
Patient Care Team:  Yessica Sr APRN as PCP - General (Family Medicine)    Chief complaint: ESRD    Consults   Subjective     History of Present Illness  77yo with h/o ESRD on dialysis m/w/f at [facility] in [place].  He had missed last 2 treatments of dialysis and presented to outside ER with weakness and dyspnea.  He was requiring supplemental oxygen and transferred to [facility].    Review of Systems   Constitutional:  Positive for fatigue.   Respiratory:  Positive for cough and shortness of breath.    Cardiovascular:  Negative for chest pain and leg swelling.   Gastrointestinal:  Negative for abdominal pain, diarrhea, nausea and vomiting.   Genitourinary:  Negative for dysuria.   Musculoskeletal:  Negative for back pain.        Past Medical History:   Diagnosis Date    Arthritis     Cancer     Coronary artery disease     Diabetes mellitus     Dialysis patient     History of transfusion     Hypertension     Kidney disease    , History reviewed. No pertinent surgical history., History reviewed. No pertinent family history.,   Social History     Socioeconomic History    Marital status:    Tobacco Use    Smoking status: Every Day     Current packs/day: 1.00     Average packs/day: 1 pack/day for 65.0 years (65.0 ttl pk-yrs)     Types: Cigarettes     Start date: 9/2/1959   Vaping Use    Vaping status: Never Used   Substance and Sexual Activity    Alcohol use: Never    Drug use: Never    Sexual activity: Defer     E-cigarette/Vaping    E-cigarette/Vaping Use Never User     Passive Exposure No     Counseling Given No      E-cigarette/Vaping Substances    Nicotine No     THC No     Flavoring No      E-cigarette/Vaping Devices    Disposable No     Pre-filled or Refillable Cartridge No     Refillable Tank No     Pre-filled Pod No          ,   Medications Prior to Admission   Medication Sig Dispense Refill Last Dose    allopurinol (ZYLOPRIM) 100 MG tablet Take 1 tablet by mouth Daily.       apixaban  (ELIQUIS) 2.5 MG tablet tablet Take 1 tablet by mouth 2 (Two) Times a Day.       atorvastatin (LIPITOR) 10 MG tablet Take 1 tablet by mouth Every Night.       bumetanide (BUMEX) 2 MG tablet Take 1 tablet by mouth 2 (Two) Times a Day.       calcium acetate (PHOS BINDER,) 667 MG capsule capsule Take 2 capsules by mouth 3 (Three) Times a Day.       carvedilol (COREG) 6.25 MG tablet Take 1.5 tablets by mouth 2 (Two) Times a Day With Meals.       cetirizine (zyrTEC) 10 MG tablet Take 1 tablet by mouth Daily.       fenofibrate 160 MG tablet Take 1 tablet by mouth Daily.       ferrous sulfate 325 (65 FE) MG tablet Take 1 tablet by mouth Daily With Breakfast.       gabapentin (NEURONTIN) 100 MG capsule Take 1 capsule by mouth 3 (Three) Times a Day.       hydrocortisone 2.5 % cream Apply 1 Application topically to the appropriate area as directed 2 (Two) Times a Day.       Insulin Degludec (TRESIBA FLEXTOUCH) 200 UNIT/ML solution pen-injector pen injection Inject 40 Units under the skin into the appropriate area as directed Every Night.       levothyroxine (SYNTHROID, LEVOTHROID) 125 MCG tablet Take 1 tablet by mouth Daily.       pantoprazole (PROTONIX) 40 MG EC tablet Take 1 tablet by mouth Daily.       primidone (MYSOLINE) 50 MG tablet Take 1 tablet by mouth 4 (Four) Times a Day.       SITagliptin (JANUVIA) 50 MG tablet Take 1 tablet by mouth Daily.       sodium zirconium cyclosilicate (LOKELMA) 10 g packet Take  by mouth. Empty entire contents of the packet(s) into a glass with at least 3 tablespoons (45 mL) of water. Stir well and drink immediately; if powder remains in the glass, add water, stir and drink immediately; repeat until no powder remains. Administer other oral medications at least 2 hours before or 2 hours after dose.      , Scheduled Meds:  apixaban, 2.5 mg, Oral, BID  arformoterol, 15 mcg, Nebulization, BID - RT  atorvastatin, 10 mg, Oral, Nightly  azithromycin, 250 mg, Oral, Q24H  budesonide, 0.5 mg,  Nebulization, BID - RT  carvedilol, 9.375 mg, Oral, BID With Meals  cefTRIAXone, 1,000 mg, Intravenous, Q24H  ferrous sulfate, 325 mg, Oral, Daily With Breakfast  gabapentin, 100 mg, Oral, TID  guaiFENesin, 600 mg, Oral, Q12H  insulin lispro, 2-7 Units, Subcutaneous, 4x Daily AC & at Bedtime  ipratropium-albuterol, 3 mL, Nebulization, Q6H - RT  levothyroxine, 125 mcg, Oral, Q AM  methylPREDNISolone sodium succinate, 40 mg, Intravenous, Q12H  pantoprazole, 40 mg, Oral, Daily  sodium chloride, 10 mL, Intravenous, Q12H   , Continuous Infusions:   , PRN Meds:    senna-docusate sodium **AND** polyethylene glycol **AND** bisacodyl **AND** bisacodyl    dextrose    dextrose    glucagon (human recombinant)    guaiFENesin-dextromethorphan    ipratropium-albuterol    ondansetron    sodium chloride    sodium chloride, and Allergies:  Ketamine    Objective     Vital Signs  Temp:  [97.2 °F (36.2 °C)-98.7 °F (37.1 °C)] 97.2 °F (36.2 °C)  Heart Rate:  [75-87] 87  Resp:  [16-20] 20  BP: ()/(46-74) 129/46    I/O this shift:  In: 240 [P.O.:240]  Out: -   I/O last 3 completed shifts:  In: 240 [P.O.:240]  Out: -     Physical Exam  Constitutional:       Appearance: Normal appearance.   HENT:      Nose: Nose normal.      Mouth/Throat:      Mouth: Mucous membranes are moist.   Eyes:      General: No scleral icterus.     Pupils: Pupils are equal, round, and reactive to light.   Cardiovascular:      Rate and Rhythm: Normal rate and regular rhythm.      Pulses: Normal pulses.      Heart sounds: Normal heart sounds.   Pulmonary:      Effort: Pulmonary effort is normal.      Breath sounds: Normal breath sounds.   Abdominal:      General: Abdomen is flat.      Palpations: Abdomen is soft.   Musculoskeletal:         General: Normal range of motion.      Right lower leg: No edema.      Left lower leg: No edema.   Neurological:      General: No focal deficit present.      Mental Status: He is alert.         Results Review:    I reviewed the  "patient's new clinical results.  I reviewed the patient's new imaging results and agree with the interpretation.  I reviewed the patient's other test results and agree with the interpretation    WBC WBC   Date Value Ref Range Status   09/02/2024 8.84 3.40 - 10.80 10*3/mm3 Final      HGB Hemoglobin   Date Value Ref Range Status   09/02/2024 11.0 (L) 13.0 - 17.7 g/dL Final      HCT Hematocrit   Date Value Ref Range Status   09/02/2024 34.9 (L) 37.5 - 51.0 % Final      Platlets No results found for: \"LABPLAT\"   MCV MCV   Date Value Ref Range Status   09/02/2024 98.0 (H) 79.0 - 97.0 fL Final          Sodium Sodium   Date Value Ref Range Status   09/02/2024 140 136 - 145 mmol/L Final      Potassium Potassium   Date Value Ref Range Status   09/02/2024 5.1 3.5 - 5.2 mmol/L Final      Chloride Chloride   Date Value Ref Range Status   09/02/2024 100 98 - 107 mmol/L Final      CO2 CO2   Date Value Ref Range Status   09/02/2024 23.2 22.0 - 29.0 mmol/L Final      BUN BUN   Date Value Ref Range Status   09/02/2024 66 (H) 8 - 23 mg/dL Final      Creatinine Creatinine   Date Value Ref Range Status   09/02/2024 9.88 (H) 0.76 - 1.27 mg/dL Final      Calcium Calcium   Date Value Ref Range Status   09/02/2024 7.8 (L) 8.6 - 10.5 mg/dL Final      PO4 No results found for: \"CAPO4\"   Albumin Albumin   Date Value Ref Range Status   09/02/2024 2.8 (L) 3.5 - 5.2 g/dL Final      Magnesium Magnesium   Date Value Ref Range Status   09/02/2024 2.0 1.6 - 2.4 mg/dL Final      Uric Acid No results found for: \"URICACID\"         Assessment & Plan       COPD with acute exacerbation    Hyperkalemia    Acute on chronic respiratory failure    ESRD (end stage renal disease)    HTN (hypertension)    DM2 (diabetes mellitus, type 2)    CAD (coronary artery disease)      Assessment & Plan  ESRD-  Will plan dialysis today and continue regular schedule.  Continue m/w/f schedule.      HTN-  restart home bp meds.    DMII-  diabetic diet.    PNA-  + covid.  IV abx " started also for emperic coverage.    I discussed the patients findings and my recommendations with patient, family, and primary care team    Nain Wetzel MD  09/03/24  09:01 EDT

## 2024-09-03 NOTE — NURSING NOTE
Duration of Treatment 4.0 Hours                                                50 min   Access Site Tunneled Dialysis Catheter   Dialyzer Revaclear    mL/min   Dialysate Temperature (C) 36   BFR-As tolerated to a maximum of: 400 mL/min   Dialysate Solution Bath: K+ = 2 mEq, Ca = 2.5mEq   Bicarb 40 mEq   Na+ 137 meq   Fluid Removal: 4L                                                            600 ml       Pt was ordered for 4 hour treatment but due to difficulty with TDC pt only had 50 min of treatment.  Blood flow rate was decreased to 200 and nurses tried to reverse lines and arterial pressures still remained very low.  Dr. Wetzel notified and cathflo was ordered to sit overnight in each line, cathflo was placed and lines were covered and labeled.  Pt is scheduled for HD 9/4/24.  No other orders at this time.  Pt was at baseline during short time in HD unit.  Last BP was 135/61.  Report given to Melanie.

## 2024-09-04 LAB
ALBUMIN SERPL-MCNC: 2.8 G/DL (ref 3.5–5.2)
ALP SERPL-CCNC: 137 U/L (ref 39–117)
ALT SERPL W P-5'-P-CCNC: 5 U/L (ref 1–41)
ANION GAP SERPL CALCULATED.3IONS-SCNC: 19.4 MMOL/L (ref 5–15)
ARTERIAL PATENCY WRIST A: ABNORMAL
ARTERIAL PATENCY WRIST A: ABNORMAL
AST SERPL-CCNC: 22 U/L (ref 1–40)
ATMOSPHERIC PRESS: 745.7 MMHG
ATMOSPHERIC PRESS: 747.7 MMHG
BASE EXCESS BLDA CALC-SCNC: 1.3 MMOL/L (ref -2–2)
BASE EXCESS BLDA CALC-SCNC: 2.1 MMOL/L (ref -2–2)
BASOPHILS # BLD AUTO: 0.04 10*3/MM3 (ref 0–0.2)
BASOPHILS NFR BLD AUTO: 0.5 % (ref 0–1.5)
BDY SITE: ABNORMAL
BDY SITE: ABNORMAL
BILIRUB CONJ SERPL-MCNC: 0.2 MG/DL (ref 0–0.3)
BILIRUB INDIRECT SERPL-MCNC: 0.2 MG/DL
BILIRUB SERPL-MCNC: 0.4 MG/DL (ref 0–1.2)
BUN SERPL-MCNC: 79 MG/DL (ref 8–23)
BUN/CREAT SERPL: 7.9 (ref 7–25)
CA-I BLDA-SCNC: 0.99 MMOL/L (ref 1.13–1.32)
CALCIUM SPEC-SCNC: 8 MG/DL (ref 8.6–10.5)
CHLORIDE BLDA-SCNC: 97 MMOL/L (ref 98–107)
CHLORIDE SERPL-SCNC: 97 MMOL/L (ref 98–107)
CO2 SERPL-SCNC: 20.6 MMOL/L (ref 22–29)
CREAT SERPL-MCNC: 10.01 MG/DL (ref 0.76–1.27)
D-LACTATE SERPL-SCNC: 1.5 MMOL/L
DEPRECATED RDW RBC AUTO: 57.1 FL (ref 37–54)
EGFRCR SERPLBLD CKD-EPI 2021: 4.9 ML/MIN/1.73
EOSINOPHIL # BLD AUTO: 0 10*3/MM3 (ref 0–0.4)
EOSINOPHIL NFR BLD AUTO: 0 % (ref 0.3–6.2)
ERYTHROCYTE [DISTWIDTH] IN BLOOD BY AUTOMATED COUNT: 15.7 % (ref 12.3–15.4)
GAS FLOW AIRWAY: 2 LPM
GAS FLOW AIRWAY: 3 LPM
GLUCOSE BLDC GLUCOMTR-MCNC: 170 MG/DL (ref 70–99)
GLUCOSE BLDC GLUCOMTR-MCNC: 175 MG/DL (ref 70–99)
GLUCOSE BLDC GLUCOMTR-MCNC: 192 MG/DL (ref 70–99)
GLUCOSE BLDC GLUCOMTR-MCNC: 92 MG/DL (ref 70–99)
GLUCOSE BLDC GLUCOMTR-MCNC: 99 MG/DL (ref 70–99)
GLUCOSE SERPL-MCNC: 238 MG/DL (ref 65–99)
HCO3 BLDA-SCNC: 26.1 MMOL/L (ref 22–26)
HCO3 BLDA-SCNC: 28.2 MMOL/L (ref 22–26)
HCT VFR BLD AUTO: 35.9 % (ref 37.5–51)
HCT VFR BLD CALC: 34 % (ref 38–51)
HCT VFR BLD CALC: 36 % (ref 38–51)
HEMODILUTION: NO
HEMODILUTION: NO
HGB BLD-MCNC: 11.1 G/DL (ref 13–17.7)
HGB BLDA-MCNC: 11.6 G/DL (ref 12–18)
HGB BLDA-MCNC: 12.3 G/DL (ref 12–18)
IMM GRANULOCYTES # BLD AUTO: 0.13 10*3/MM3 (ref 0–0.05)
IMM GRANULOCYTES NFR BLD AUTO: 1.7 % (ref 0–0.5)
INHALED O2 CONCENTRATION: 28 %
LYMPHOCYTES # BLD AUTO: 1.88 10*3/MM3 (ref 0.7–3.1)
LYMPHOCYTES NFR BLD AUTO: 24.2 % (ref 19.6–45.3)
MAGNESIUM SERPL-MCNC: 2 MG/DL (ref 1.6–2.4)
MCH RBC QN AUTO: 30.7 PG (ref 26.6–33)
MCHC RBC AUTO-ENTMCNC: 30.9 G/DL (ref 31.5–35.7)
MCV RBC AUTO: 99.2 FL (ref 79–97)
MODALITY: ABNORMAL
MODALITY: ABNORMAL
MONOCYTES # BLD AUTO: 0.6 10*3/MM3 (ref 0.1–0.9)
MONOCYTES NFR BLD AUTO: 7.7 % (ref 5–12)
NEUTROPHILS NFR BLD AUTO: 5.11 10*3/MM3 (ref 1.7–7)
NEUTROPHILS NFR BLD AUTO: 65.9 % (ref 42.7–76)
NRBC BLD AUTO-RTO: 0 /100 WBC (ref 0–0.2)
PCO2 BLDA: 41.3 MM HG (ref 35–45)
PCO2 BLDA: 49.1 MM HG (ref 35–45)
PH BLDA: 7.37 PH UNITS (ref 7.35–7.45)
PH BLDA: 7.41 PH UNITS (ref 7.35–7.45)
PHOSPHATE SERPL-MCNC: 9.6 MG/DL (ref 2.5–4.5)
PLATELET # BLD AUTO: 234 10*3/MM3 (ref 140–450)
PMV BLD AUTO: 11 FL (ref 6–12)
PO2 BLD: 299 MM[HG] (ref 0–500)
PO2 BLDA: 83.7 MM HG (ref 80–100)
PO2 BLDA: 92.4 MM HG (ref 80–100)
POTASSIUM BLDA-SCNC: 3.9 MMOL/L (ref 3.5–5)
POTASSIUM SERPL-SCNC: 5.4 MMOL/L (ref 3.5–5.2)
PROT SERPL-MCNC: 6.8 G/DL (ref 6–8.5)
QT INTERVAL: 440 MS
QTC INTERVAL: 493 MS
RBC # BLD AUTO: 3.62 10*6/MM3 (ref 4.14–5.8)
SAO2 % BLDCOA: 96.3 % (ref 95–99)
SAO2 % BLDCOA: 96.8 % (ref 95–99)
SODIUM BLD-SCNC: 136 MMOL/L (ref 131–143)
SODIUM SERPL-SCNC: 137 MMOL/L (ref 136–145)
WBC NRBC COR # BLD AUTO: 7.76 10*3/MM3 (ref 3.4–10.8)

## 2024-09-04 PROCEDURE — 83605 ASSAY OF LACTIC ACID: CPT

## 2024-09-04 PROCEDURE — 85025 COMPLETE CBC W/AUTO DIFF WBC: CPT | Performed by: FAMILY MEDICINE

## 2024-09-04 PROCEDURE — 36600 WITHDRAWAL OF ARTERIAL BLOOD: CPT

## 2024-09-04 PROCEDURE — 36600 WITHDRAWAL OF ARTERIAL BLOOD: CPT | Performed by: FAMILY MEDICINE

## 2024-09-04 PROCEDURE — 82330 ASSAY OF CALCIUM: CPT

## 2024-09-04 PROCEDURE — 94799 UNLISTED PULMONARY SVC/PX: CPT

## 2024-09-04 PROCEDURE — 25010000002 HALOPERIDOL LACTATE PER 5 MG: Performed by: PHYSICIAN ASSISTANT

## 2024-09-04 PROCEDURE — 80048 BASIC METABOLIC PNL TOTAL CA: CPT | Performed by: FAMILY MEDICINE

## 2024-09-04 PROCEDURE — 82803 BLOOD GASES ANY COMBINATION: CPT

## 2024-09-04 PROCEDURE — 25010000002 CEFTRIAXONE PER 250 MG: Performed by: FAMILY MEDICINE

## 2024-09-04 PROCEDURE — 94660 CPAP INITIATION&MGMT: CPT

## 2024-09-04 PROCEDURE — 82948 REAGENT STRIP/BLOOD GLUCOSE: CPT | Performed by: FAMILY MEDICINE

## 2024-09-04 PROCEDURE — 83735 ASSAY OF MAGNESIUM: CPT | Performed by: FAMILY MEDICINE

## 2024-09-04 PROCEDURE — 82803 BLOOD GASES ANY COMBINATION: CPT | Performed by: FAMILY MEDICINE

## 2024-09-04 PROCEDURE — 99233 SBSQ HOSP IP/OBS HIGH 50: CPT | Performed by: INTERNAL MEDICINE

## 2024-09-04 PROCEDURE — 94664 DEMO&/EVAL PT USE INHALER: CPT

## 2024-09-04 PROCEDURE — 84100 ASSAY OF PHOSPHORUS: CPT | Performed by: FAMILY MEDICINE

## 2024-09-04 PROCEDURE — 80051 ELECTROLYTE PANEL: CPT

## 2024-09-04 PROCEDURE — 80076 HEPATIC FUNCTION PANEL: CPT | Performed by: FAMILY MEDICINE

## 2024-09-04 PROCEDURE — 25010000002 HEPARIN (PORCINE) PER 1000 UNITS: Performed by: INTERNAL MEDICINE

## 2024-09-04 PROCEDURE — 25010000002 METHYLPREDNISOLONE PER 40 MG: Performed by: FAMILY MEDICINE

## 2024-09-04 PROCEDURE — 99232 SBSQ HOSP IP/OBS MODERATE 35: CPT | Performed by: FAMILY MEDICINE

## 2024-09-04 PROCEDURE — 82948 REAGENT STRIP/BLOOD GLUCOSE: CPT

## 2024-09-04 PROCEDURE — 94761 N-INVAS EAR/PLS OXIMETRY MLT: CPT

## 2024-09-04 RX ORDER — GABAPENTIN 100 MG/1
100 CAPSULE ORAL EVERY MORNING
Status: DISCONTINUED | OUTPATIENT
Start: 2024-09-04 | End: 2024-09-05

## 2024-09-04 RX ORDER — HEPARIN SODIUM 1000 [USP'U]/ML
3200 INJECTION, SOLUTION INTRAVENOUS; SUBCUTANEOUS AS NEEDED
Status: DISCONTINUED | OUTPATIENT
Start: 2024-09-04 | End: 2024-09-10 | Stop reason: HOSPADM

## 2024-09-04 RX ORDER — QUETIAPINE FUMARATE 25 MG/1
12.5 TABLET, FILM COATED ORAL ONCE
Status: COMPLETED | OUTPATIENT
Start: 2024-09-05 | End: 2024-09-04

## 2024-09-04 RX ORDER — PREDNISONE 20 MG/1
40 TABLET ORAL
Status: DISCONTINUED | OUTPATIENT
Start: 2024-09-05 | End: 2024-09-07

## 2024-09-04 RX ORDER — HALOPERIDOL 5 MG/ML
2 INJECTION INTRAMUSCULAR ONCE
Status: COMPLETED | OUTPATIENT
Start: 2024-09-04 | End: 2024-09-04

## 2024-09-04 RX ADMIN — GUAIFENESIN 600 MG: 600 TABLET ORAL at 21:11

## 2024-09-04 RX ADMIN — WATER 40 MG: 1 INJECTION INTRAMUSCULAR; INTRAVENOUS; SUBCUTANEOUS at 03:55

## 2024-09-04 RX ADMIN — LEVOTHYROXINE SODIUM 125 MCG: 125 TABLET ORAL at 05:34

## 2024-09-04 RX ADMIN — GABAPENTIN 100 MG: 100 CAPSULE ORAL at 16:04

## 2024-09-04 RX ADMIN — HALOPERIDOL LACTATE 2 MG: 5 INJECTION, SOLUTION INTRAMUSCULAR at 21:26

## 2024-09-04 RX ADMIN — Medication 1 APPLICATION: at 18:39

## 2024-09-04 RX ADMIN — ARFORMOTEROL TARTRATE 15 MCG: 15 SOLUTION RESPIRATORY (INHALATION) at 07:38

## 2024-09-04 RX ADMIN — Medication 10 ML: at 16:04

## 2024-09-04 RX ADMIN — ATORVASTATIN CALCIUM 10 MG: 10 TABLET, FILM COATED ORAL at 21:11

## 2024-09-04 RX ADMIN — GUAIFENESIN 600 MG: 600 TABLET ORAL at 16:04

## 2024-09-04 RX ADMIN — IPRATROPIUM BROMIDE AND ALBUTEROL SULFATE 3 ML: .5; 3 SOLUTION RESPIRATORY (INHALATION) at 18:31

## 2024-09-04 RX ADMIN — IPRATROPIUM BROMIDE AND ALBUTEROL SULFATE 3 ML: .5; 3 SOLUTION RESPIRATORY (INHALATION) at 00:20

## 2024-09-04 RX ADMIN — CARVEDILOL 9.38 MG: 6.25 TABLET, FILM COATED ORAL at 21:11

## 2024-09-04 RX ADMIN — IPRATROPIUM BROMIDE AND ALBUTEROL SULFATE 3 ML: .5; 3 SOLUTION RESPIRATORY (INHALATION) at 07:43

## 2024-09-04 RX ADMIN — APIXABAN 2.5 MG: 2.5 TABLET, FILM COATED ORAL at 16:04

## 2024-09-04 RX ADMIN — ARFORMOTEROL TARTRATE 15 MCG: 15 SOLUTION RESPIRATORY (INHALATION) at 18:31

## 2024-09-04 RX ADMIN — CEFTRIAXONE SODIUM 1000 MG: 1 INJECTION, POWDER, FOR SOLUTION INTRAMUSCULAR; INTRAVENOUS at 16:06

## 2024-09-04 RX ADMIN — SODIUM ZIRCONIUM CYCLOSILICATE 10 G: 10 POWDER, FOR SUSPENSION ORAL at 16:04

## 2024-09-04 RX ADMIN — Medication 10 ML: at 21:11

## 2024-09-04 RX ADMIN — BUDESONIDE 0.5 MG: 0.5 SUSPENSION RESPIRATORY (INHALATION) at 07:38

## 2024-09-04 RX ADMIN — AZITHROMYCIN DIHYDRATE 250 MG: 250 TABLET ORAL at 16:04

## 2024-09-04 RX ADMIN — BUDESONIDE 0.5 MG: 0.5 SUSPENSION RESPIRATORY (INHALATION) at 18:31

## 2024-09-04 RX ADMIN — HEPARIN SODIUM 3200 UNITS: 1000 INJECTION INTRAVENOUS; SUBCUTANEOUS at 13:42

## 2024-09-04 RX ADMIN — QUETIAPINE FUMARATE 12.5 MG: 25 TABLET ORAL at 23:16

## 2024-09-04 RX ADMIN — PANTOPRAZOLE SODIUM 40 MG: 40 TABLET, DELAYED RELEASE ORAL at 16:04

## 2024-09-04 RX ADMIN — Medication 1 APPLICATION: at 21:12

## 2024-09-04 RX ADMIN — APIXABAN 2.5 MG: 2.5 TABLET, FILM COATED ORAL at 21:11

## 2024-09-04 NOTE — PROGRESS NOTES
Pulmonary / Critical Care Progress Note      Patient Name: Pedro Tilley  : 1945  MRN: [id number]  Primary Care Physician:  Yessica Sr, WENDY  Date of admission: 2024    Subjective   Subjective   Follow-up for acute on chronic COPD exacerbation, COVID-19 respiratory infection    Over past 24 hours: Underwent dialysis, remains on azithromycin and ceftriaxone, continues on Solu-Medrol, remains on nebulizers.    No acute events overnight.     This morning,   Seen in dialysis today  Remains on 2 L nasal cannula  Dyspnea improving  Overall feeling better  Cough improving  600 mL removed in dialysis yesterday  Hyperkalemic  No fever or chills      Objective   Objective     Vitals:   Temp:  [96.6 °F (35.9 °C)-97.7 °F (36.5 °C)] 96.8 °F (36 °C)  Heart Rate:  [75-77] 75  Resp:  [16-20] 16  BP: (105-135)/(51-62) 105/61  Flow (L/min):  [1] 1    Physical Exam   Vital Signs Reviewed   General: Chronically ill-appearing male, Alert, NAD, lying in bed.    Chest:  good aeration, scattered rhonchi with Velcro-like crackles to auscultation bilaterally, no work of breathing noted on 2 L NC  CV: RRR, no MGR, pulses 2+, equal.  Abd:  Soft, NT, ND, + BS, no HSM  EXT:  no clubbing, no cyanosis, no edema  Neuro:  A&Ox3, CN grossly intact, no focal deficits.  Skin: No rashes or lesions noted      Result Review    Result Review:  I have personally reviewed the results from the time of this admission to 2024 07:41 EDT and agree with these findings:  [x]  Laboratory  [x]  Microbiology  [x]  Radiology  [x]  EKG/Telemetry   [x]  Cardiology/Vascular   []  Pathology  []  Old records  []  Other:  Most notable findings include:   proBNP 30,538      Lab 24  0519 24  2132   WBC 7.76 8.84   HEMOGLOBIN 11.1* 11.0*   HEMATOCRIT 35.9* 34.9*   PLATELETS 234 237   SODIUM 137 140   POTASSIUM 5.4* 5.1   CHLORIDE 97* 100   CO2 20.6* 23.2   BUN 79* 66*   CREATININE 10.01* 9.88*   GLUCOSE 238* 114*   CALCIUM 8.0* 7.8*    PHOSPHORUS 9.6* 8.7*   TOTAL PROTEIN 6.8 6.7   ALBUMIN 2.8* 2.8*   GLOBULIN  --  3.9     CT Chest Without Contrast Diagnostic    Result Date: 9/3/2024  CT CHEST WO CONTRAST DIAGNOSTIC Date of Exam: 9/3/2024 9:00 AM EDT Indication: hypercapnea with acute hypoxemia. Comparison: 2/20/2020 Technique: Axial CT images were obtained of the chest without contrast administration.  Reconstructed coronal and sagittal images were also obtained. Automated exposure control and iterative construction methods were used. Findings: Cardiomegaly is present. There is minimal pericardial fluid. There are small pleural effusions. There is interlobular septal thickening in the lower lung fields consistent with pulmonary edema. There is mild patchy airspace consolidation likely atelectasis. Pneumonia is also possible. There are scattered calcified granulomas. There is no mediastinal, axillary or hilar adenopathy. There is a left-sided cardiac pacemaker/AICD. There is a right IJ central venous catheter. Gallstones are present in  the gallbladder. The left kidney is not visualized and may be surgically absent. Surgical clips are present in the left upper quadrant. There are a few hypodense masses in the visualized right kidney measuring up to 2 cm. They may represent cysts. They are indeterminate without contrast. There is bilateral gynecomastia. Degenerative changes are present in the thoracic spine. There is a 4 cm duodenal diverticulum. There is mild stranding around the pancreas.     Impression: Impression: 1. Findings suggest congestive heart failure with small pleural effusions. Mild airspace consolidation in the lower lung fields is likely atelectasis. Superimposed pneumonia is also possible. 2. Cholelithiasis. 3. Mild stranding around the pancreas. Suggest correlation with any history of acute pancreatitis. 4. Hypodense right renal masses measuring up to 2 cm possibly representing cysts. They're indeterminate without contrast. 5. 4  cm duodenal diverticulum. Electronically Signed: Matthew David MD  9/3/2024 11:06 AM EDT  Workstation ID: [id number]     Assessment & Plan   Assessment / Plan     Active Hospital Problems:  Active Hospital Problems    Diagnosis     **COPD with acute exacerbation     Acute on chronic respiratory failure     ESRD (end stage renal disease)     HTN (hypertension)     DM2 (diabetes mellitus, type 2)     CAD (coronary artery disease)     Hyperkalemia      Impression:   Acute on chronic hypoxic respiratory failure requiring supplemental oxygen  Acute on chronic COPD exacerbation  Mycoplasma pneumonia  COVID-19 respiratory tract infection  ESRD on dialysis  Elevated proBNP, 30,538  HFpEF  A-fib on Eliquis    Plan:   -Currently on 2 L nasal cannula.  Continue to wean O2 to maintain SpO2 greater than 90%  -CT chest reviewed revealing small bilateral pleural effusions with mild airspace consolidation in lower lung field likely related to atelectasis.  Superimposed pneumonia also possible.  -Continue azithromycin and ceftriaxone for pneumonia  -Mycoplasma IgM positive.  S pneumo and Legionella negative.  Sputum culture pending.  -Solu-Medrol de-escalated to prednisone 40 mg daily  -Continue Brovana, Pulmicort, DuoNebs  -Continue bronchopulmonary hygiene.  Encourage I-S and flutter  -Encourage activity as tolerated  -Continue Eliquis for A-fib  -Trend electrolytes and renal panel.  Agree with [facility] for hyperkalemia  -Nephrology on board.  Appreciate assistance. HD timing per nephrology.  -PT/OT on board.  Appreciate assistance  -Pulmonary rehab consulted        VTE Prophylaxis:  Pharmacologic VTE prophylaxis orders are present.    CODE STATUS:   Level Of Support Discussed With: Patient  Code Status (Patient has no pulse and is not breathing): CPR (Attempt to Resuscitate)  Medical Interventions (Patient has pulse or is breathing): Full Support      I personally reviewed pertinent labs, imaging and provider notes. Discussed with  bedside nurse and will discuss with primary service.     Electronically signed by Sagar Nicole MD, 9/4/2024, 07:41 EDT.    Electronically signed by WENDY Villalobos, 09/04/24, 1:19 PM EDT.    This visit was performed by BOTH a physician and an APC. I personally evaluated and examined the patient. I performed all aspects of MDM as documented. , I have reviewed and confirmed the accuracy of the patient's history as documented in this note., and I have reexamined the patient and the results are consistent with the previously documented exam. I have updated the documentation as necessary.     Electronically signed by Sagar Nicole MD, 09/04/24, 3:20 PM EDT.

## 2024-09-04 NOTE — PROGRESS NOTES
RT EQUIPMENT DEVICE RELATED - SKIN ASSESSMENT    RT Medical Equipment/Device:     NIV Mask:  Under-the-nose   size:  b    Skin Assessment:      Cheek:  Intact  Chin:  Intact  Nares:  Intact  Nose:  Intact  Lips:  Intact    Device Skin Pressure Protection:  Pressure points protected    Nurse Notification:  Ernestina Gutierrez, CRT

## 2024-09-04 NOTE — PROGRESS NOTES
" LOS: 2 days   Patient Care Team:  Yessica Sr APRN as PCP - General (Family Medicine)    Chief Complaint: ESRD    Subjective     History of Present Illness  Pt without any new  complaints      Subjective    History taken from: patient chart RN    Objective     Vital Sign Min/Max for last 24 hours  Temp  Min: 96.6 °F (35.9 °C)  Max: 97.7 °F (36.5 °C)   BP  Min: 116/53  Max: 135/60   Pulse  Min: 75  Max: 87   Resp  Min: 18  Max: 20   SpO2  Min: 96 %  Max: 100 %   Flow (L/min)  Min: 1  Max: 1   No data recorded     Flowsheet Rows      Flowsheet Row First Filed Value   Admission Height 180.3 cm (71\") Documented at 09/02/2024 2147   Admission Weight 117 kg (257 lb 15 oz) Documented at 09/02/2024 2059            No intake/output data recorded.  I/O last 3 completed shifts:  In: 1200 [P.O.:1200]  Out: 600     Objective:  General Appearance:  Comfortable.    Vital signs: (most recent): Blood pressure 135/60, pulse 75, temperature 97.3 °F (36.3 °C), temperature source Oral, resp. rate 18, height 180.3 cm (71\"), weight 117 kg (257 lb 15 oz), SpO2 97%.  Vital signs are normal.    HEENT: Normal HEENT exam.    Lungs:  Normal effort and normal respiratory rate.    Heart: Normal rate.  Regular rhythm.    Abdomen: Abdomen is soft.  Bowel sounds are normal.   There is no abdominal tenderness.     Extremities: Normal range of motion.  There is no dependent edema.    Pulses: Distal pulses are intact.    Neurological: Patient is alert and oriented to person, place and time.    Pupils:  Pupils are equal, round, and reactive to light.    Skin:  Warm and dry.                Results Review:     I reviewed the patient's new clinical results.  I reviewed the patient's new imaging results and agree with the interpretation.  I reviewed the patient's other test results and agree with the interpretation    WBC WBC   Date Value Ref Range Status   09/04/2024 7.76 3.40 - 10.80 10*3/mm3 Final   09/02/2024 8.84 3.40 - 10.80 10*3/mm3 " "Final      HGB Hemoglobin   Date Value Ref Range Status   09/04/2024 11.1 (L) 13.0 - 17.7 g/dL Final   09/02/2024 11.0 (L) 13.0 - 17.7 g/dL Final      HCT Hematocrit   Date Value Ref Range Status   09/04/2024 35.9 (L) 37.5 - 51.0 % Final   09/02/2024 34.9 (L) 37.5 - 51.0 % Final      Platlets No results found for: \"LABPLAT\"   MCV MCV   Date Value Ref Range Status   09/04/2024 99.2 (H) 79.0 - 97.0 fL Final   09/02/2024 98.0 (H) 79.0 - 97.0 fL Final          Sodium Sodium   Date Value Ref Range Status   09/04/2024 137 136 - 145 mmol/L Final   09/02/2024 140 136 - 145 mmol/L Final      Potassium Potassium   Date Value Ref Range Status   09/04/2024 5.4 (H) 3.5 - 5.2 mmol/L Final   09/02/2024 5.1 3.5 - 5.2 mmol/L Final      Chloride Chloride   Date Value Ref Range Status   09/04/2024 97 (L) 98 - 107 mmol/L Final   09/02/2024 100 98 - 107 mmol/L Final      CO2 CO2   Date Value Ref Range Status   09/04/2024 20.6 (L) 22.0 - 29.0 mmol/L Final   09/02/2024 23.2 22.0 - 29.0 mmol/L Final      BUN BUN   Date Value Ref Range Status   09/04/2024 79 (H) 8 - 23 mg/dL Final   09/02/2024 66 (H) 8 - 23 mg/dL Final      Creatinine Creatinine   Date Value Ref Range Status   09/04/2024 10.01 (H) 0.76 - 1.27 mg/dL Final   09/02/2024 9.88 (H) 0.76 - 1.27 mg/dL Final      Calcium Calcium   Date Value Ref Range Status   09/04/2024 8.0 (L) 8.6 - 10.5 mg/dL Final   09/02/2024 7.8 (L) 8.6 - 10.5 mg/dL Final      PO4 No results found for: \"CAPO4\"   Albumin Albumin   Date Value Ref Range Status   09/02/2024 2.8 (L) 3.5 - 5.2 g/dL Final      Magnesium Magnesium   Date Value Ref Range Status   09/04/2024 2.0 1.6 - 2.4 mg/dL Final   09/02/2024 2.0 1.6 - 2.4 mg/dL Final      Uric Acid No results found for: \"URICACID\"     Medication Review:   apixaban, 2.5 mg, Oral, BID  arformoterol, 15 mcg, Nebulization, BID - RT  atorvastatin, 10 mg, Oral, Nightly  azithromycin, 250 mg, Oral, Q24H  budesonide, 0.5 mg, Nebulization, BID - RT  carvedilol, 9.375 mg, " Oral, BID With Meals  cefTRIAXone, 1,000 mg, Intravenous, Q24H  dimethicone, 1 Application, Topical, 2 times per day  ferrous sulfate, 325 mg, Oral, Daily With Breakfast  gabapentin, 100 mg, Oral, TID  guaiFENesin, 600 mg, Oral, Q12H  insulin lispro, 2-7 Units, Subcutaneous, 4x Daily AC & at Bedtime  ipratropium-albuterol, 3 mL, Nebulization, Q6H - RT  levothyroxine, 125 mcg, Oral, Q AM  methylPREDNISolone sodium succinate, 40 mg, Intravenous, Q12H  pantoprazole, 40 mg, Oral, Daily  sodium chloride, 10 mL, Intravenous, Q12H          Assessment & Plan       COPD with acute exacerbation    Hyperkalemia    Acute on chronic respiratory failure    ESRD (end stage renal disease)    HTN (hypertension)    DM2 (diabetes mellitus, type 2)    CAD (coronary artery disease)      Assessment & Plan  ESRD-  dialysis again today and continue regular schedule.  Continue m/w/f schedule.  RIJ TDC for Access.  Did cathflo yesterday due to poor catheter function.     HTN-  restart home bp meds.     DMII-  diabetic diet.     PNA-  + covid.  IV abx started also for emperic coverage.    Nain Wetzel MD  09/04/24  06:27 EDT

## 2024-09-04 NOTE — PLAN OF CARE
Problem: Noninvasive Ventilation Acute  Goal: Effective Unassisted Ventilation and Oxygenation  Intervention: Monitor and Manage Noninvasive Ventilation  Flowsheets  Taken 9/4/2024 0850  NPPV/CPAP Maintenance:   mask secure   mask adjusted  Taken 9/4/2024 0745  Airway/Ventilation Management: airway patency maintained   Goal Outcome Evaluation: PT seemed in no distress this AM, stated he didn't wear his machine during the night, was on 1 l/m but cannula was out of nose- on RA 98%

## 2024-09-04 NOTE — PLAN OF CARE
Goal Outcome Evaluation:   Took patient off 1Lnc last night but was back on whenever RT took over floor @ 1800. Patient sat well into the 90's on/off nc. Patient refused v60 tonight and only tolerated last night for about 10 minutes. No issues or changes at this time.

## 2024-09-04 NOTE — SIGNIFICANT NOTE
09/04/24 0952   OTHER   Discipline occupational therapist   Rehab Time/Intention   Session Not Performed   (off unit: dialysis)

## 2024-09-04 NOTE — NURSING NOTE
Duration of Treatment 4.0 Hours   Access Site Tunneled Dialysis Catheter   Dialyzer Revaclear    mL/min   Dialysate Temperature (C) 36   BFR-As tolerated to a maximum of: 400 mL/min   Dialysate Solution Bath: K+ = 2 mEq, Ca = 2.5mEq   Bicarb 40 mEq   Na+ 137 meq   Fluid Removal: 3L     Pt completed entire tx. Lines reversed. Removed 3L. Post /56.     Report given to Cleo WING.

## 2024-09-04 NOTE — PROGRESS NOTES
[facility]   Hospitalist Progress Note  Date: 2024  Patient Name: Pedro Tilley  : 1945  MRN: [id number]  Date of admission: 2024      Subjective   Subjective     Chief complaint: Shortness of breath, generalized weakness    Summary:  78-year-old male with end-stage renal disease on hemodialysis, diabetes, hypertension, CAD, COPD, GERD without esophagitis, chronic diastolic heart failure, hospitalized on 2024 as a transfer from an outside facility for weakness and shortness of breath/chief complaint, he was placed on antibiotics after chest x-ray showed pneumonia and he is found to be COVID-19 positive, he was hypoxemic upon transfer to our facility, he tested positive for mycoplasma, started on azithromycin, steroids, pulmonary consulted, nephrology consulted.    Interval follow-up: Seen and examined this morning, no acute distress, no acute major overnight events, breathing continues to improve, remains weak and fatigued.  No nausea or vomiting.  Plans have dialysis again today to correct volume status.  Remains volume overloaded.  Cathflo helped with right IJ TDC functioning, has had poor catheter function.  Potassium 5.4, dialysis should correct this, sodium 137, creatinine 10.01, white blood count 7000, hemoglobin 11.1.    Review of systems:  All systems reviewed and negative except for weakness, fatigue, cough, shortness of breath with exertional activity    Objective   Objective     Vitals:   Temp:  [96.8 °F (36 °C)-97.7 °F (36.5 °C)] 97.7 °F (36.5 °C)  Heart Rate:  [75-78] 77  Resp:  [16-20] 18  BP: ()/(51-70) 133/64  Flow (L/min):  [1-2] 2  Physical Exam               Constitutional: Awake, alert, ill-appearing lying in bed              Eyes: PERRLA, sclerae anicteric, no conjunctival injection              HENT: NCAT, mucous membranes moist              Neck: Supple, full range of motion              Respiratory: Bilateral wheezing, rhonchi, no tachypnea, on nasal cannula               Cardiovascular: RRR, no murmurs, rubs, or gallops, palpable pedal pulses bilaterally              Gastrointestinal: Positive bowel sounds, soft, nontender, nondistended              Musculoskeletal: Bilateral lower extremity edema, no clubbing or cyanosis to extremities              Psychiatric: Appropriate affect, cooperative              Neurologic: Oriented x 3, strength symmetric in all extremities, Cranial Nerves grossly intact to confrontation, speech mumbles but understandable              Skin: No rashes     Result Review    Result Review:  I have personally reviewed the pertinent results from the past 24 hours to 9/4/2024 12:17 EDT and agree with these findings:  [x]  Laboratory   CBC          8/28/2024    00:00 9/2/2024    21:32 9/4/2024    05:19   CBC   WBC  8.84  7.76    RBC  3.56  3.62    Hemoglobin 11.0        33.0     11.0  11.1    Hematocrit  34.9  35.9    MCV  98.0  99.2    MCH  30.9  30.7    MCHC  31.5  30.9    RDW  15.9  15.7    Platelets  237  234       Details          This result is from an external source.             BMP          9/2/2024    21:32 9/4/2024    05:19   BMP   BUN 66  79    Creatinine 9.88  10.01    Sodium 140  137    Potassium 5.1  5.4    Chloride 100  97    CO2 23.2  20.6    Calcium 7.8  8.0      LIVER FUNCTION TESTS:      Lab 09/04/24  0519 09/02/24  2132   TOTAL PROTEIN 6.8 6.7   ALBUMIN 2.8* 2.8*   GLOBULIN  --  3.9   ALT (SGPT) 5 5   AST (SGOT) 22 25   BILIRUBIN 0.4 0.5   INDIRECT BILIRUBIN 0.2  --    BILIRUBIN DIRECT 0.2  --    ALK PHOS 137* 119*       [x]  Microbiology   Microbiology Results (last 10 days)       Procedure Component Value - Date/Time    S. Pneumo Ag Urine or CSF - Urine, Urine, Clean Catch [[id number]]  (Normal) Collected: 09/03/24 1207    Lab Status: Final result Specimen: Urine, Clean Catch Updated: 09/03/24 1302     Strep Pneumo Ag Negative    Legionella Antigen, Urine - Urine, Urine, Clean Catch [[id number]]  (Normal) Collected: 09/03/24 1207     Lab Status: Final result Specimen: Urine, Clean Catch Updated: 09/03/24 1302     LEGIONELLA ANTIGEN, URINE Negative    Mycoplasma Pneumoniae Antibody, IgM - Blood, Arm, Left [[id number]]  (Abnormal) Collected: 09/03/24 0806    Lab Status: Final result Specimen: Blood from Arm, Left Updated: 09/03/24 0914     Mycoplasma pneumo IgM Positive              [x]  Radiology CT Chest Without Contrast Diagnostic    Result Date: 9/3/2024  Impression: 1. Findings suggest congestive heart failure with small pleural effusions. Mild airspace consolidation in the lower lung fields is likely atelectasis. Superimposed pneumonia is also possible. 2. Cholelithiasis. 3. Mild stranding around the pancreas. Suggest correlation with any history of acute pancreatitis. 4. Hypodense right renal masses measuring up to 2 cm possibly representing cysts. They're indeterminate without contrast. 5. 4 cm duodenal diverticulum. Electronically Signed: Matthew David MD  9/3/2024 11:06 AM EDT  Workstation ID: [id number]    XR Chest 1 View    Result Date: 9/2/2024  Stable cardiac enlargement with chronic changes in the lung bases. Electronically Signed: Lam Reed MD  9/2/2024 9:34 PM EDT  Workstation ID: [id number]       [x]  EKG/Telemetry   ECG 12 Lead Electrolyte Imbalance   Final Result   HEART RATE=75  bpm   RR Qttoozkv=694  ms   IN Interval=  ms   P Horizontal Axis=60  deg   P Front Axis=  deg   QRSD Qsksznkq=296  ms   QT Drgunweh=959  ms   BNyP=062  ms   QRS Axis=235  deg   T Wave Axis=71  deg   - ABNORMAL ECG -   Afib/flutter and ventricular-paced rhythm   Biventricular paced rhythm   No further analysis attempted due to paced rhythm   Electronically Signed By: Jerson Perry ([facility]) 2024-09-04 11:48:59   Date and Time of Study:2024-09-03 20:22:48      ECG 12 Lead Electrolyte Imbalance   Final Result   HEART RATE=75  bpm   RR Ipdevzrd=050  ms   IN Interval=  ms   P Horizontal Axis=  deg   P Front Axis=  deg   QRSD Ybobbmmy=824  ms   QT  Gzkpvfle=730  ms   SJpI=268  ms   QRS Axis=244  deg   T Wave Axis=57  deg   - ABNORMAL ECG -   Afib/flutter and ventricular-paced rhythm   Biventricular paced rhythm   No further analysis attempted due to paced rhythm   Electronically Signed By: Bill Rowe ([facility]) 2024-09-03 17:57:13   Date and Time of Study:2024-09-02 21:33:59          []  Cardiology/Vascular   []  Pathology  [x]  Old records  []  Other:    Assessment & Plan   Assessment / Plan     Assessment/Plan:  Assessment:  Acute hypoxemic respiratory failure  COPD with acute exacerbation    SARS-CoV-2 infection  COVID-19 causing lower respiratory tract infection  Mycoplasma pneumonia  Acute on chronic respiratory failure    ESRD (end stage renal disease)    Paroxysmal atrial fibrillation  HTN (hypertension)    DM2 (diabetes mellitus, type 2)    CAD (coronary artery disease)    Hyperkalemia      Plan:  Labs and imaging reviewed  Lokelma 10 g x 1 dose today to help with hyperkalemia  Consulted pulmonary discussed with Dr. Nicole, recommendations appreciated, working on correcting volume  Continue azithromycin for treatment for mycoplasma pneumonia  Continue ceftriaxone  Switch Solu-Medrol to prednisone 40 mg daily for 5 days total  Start Brovana Pulmicort nebs twice daily  Coreg resumed at higher dose at 9.375 mg p.o. twice daily  Continue Lipitor 10 mg nightly  Continue reduced dose gabapentin at 100 mg daily  Continue Protonix 40 mg daily  Wean oxygen per tolerance  Nephrology consulted for dialysis orders  PT/OT  AM labs  Full code  DVT prophylaxis Eliquis  Clinical course to dictate further management  Discussed with nurse at the bedside      VTE Prophylaxis:  Pharmacologic VTE prophylaxis orders are present.        CODE STATUS:   Level Of Support Discussed With: Patient  Code Status (Patient has no pulse and is not breathing): CPR (Attempt to Resuscitate)  Medical Interventions (Patient has pulse or is breathing): Full Support        Electronically signed by  Kana Tavares MD, 9/4/2024, 12:17 EDT.    Portions of this documentation were transcribed electronically from a voice recognition software.  I confirm all data accurately represents the service(s) I performed at today's visit.

## 2024-09-04 NOTE — PLAN OF CARE
Goal Outcome Evaluation:  Plan of Care Reviewed With: patient        Progress: no change  Outcome Evaluation: Patient able to answer questions related to family and recent events, however has a hard time with recalling the date and place. Patient had dialysis today, returned restless, ABG's ordered and patient placed on BIPAP. Patient has refused all meals today, was agreeable to a popsicle. Continuing plan of care.

## 2024-09-04 NOTE — PROGRESS NOTES
RT EQUIPMENT DEVICE RELATED - SKIN ASSESSMENT    RT Medical Equipment/Device:     NIV Mask:  Under-the-nose   size:  b    Skin Assessment:      Chin:  Intact  Forehead:  Intact  Nose:  Intact  Mouth:  Intact    Device Skin Pressure Protection:  Pressure points protected    Nurse Notification:  Ernestina Darby, RRT

## 2024-09-04 NOTE — PLAN OF CARE
Goal Outcome Evaluation:            A/Ox4, Pt on 1LNC, Pt up to bedside a lot. PT to go to diaylsis today. Will continue plan of care.

## 2024-09-05 LAB
ALBUMIN SERPL-MCNC: 2.8 G/DL (ref 3.5–5.2)
ALP SERPL-CCNC: 77 U/L (ref 39–117)
ALT SERPL W P-5'-P-CCNC: 6 U/L (ref 1–41)
ANION GAP SERPL CALCULATED.3IONS-SCNC: 14.6 MMOL/L (ref 5–15)
AST SERPL-CCNC: 24 U/L (ref 1–40)
BASOPHILS # BLD AUTO: 0.03 10*3/MM3 (ref 0–0.2)
BASOPHILS NFR BLD AUTO: 0.3 % (ref 0–1.5)
BILIRUB CONJ SERPL-MCNC: 0.3 MG/DL (ref 0–0.3)
BILIRUB INDIRECT SERPL-MCNC: 0.1 MG/DL
BILIRUB SERPL-MCNC: 0.4 MG/DL (ref 0–1.2)
BUN SERPL-MCNC: 48 MG/DL (ref 8–23)
BUN/CREAT SERPL: 7.2 (ref 7–25)
CALCIUM SPEC-SCNC: 8 MG/DL (ref 8.6–10.5)
CHLORIDE SERPL-SCNC: 99 MMOL/L (ref 98–107)
CO2 SERPL-SCNC: 26.4 MMOL/L (ref 22–29)
CREAT SERPL-MCNC: 6.7 MG/DL (ref 0.76–1.27)
DEPRECATED RDW RBC AUTO: 54.5 FL (ref 37–54)
EGFRCR SERPLBLD CKD-EPI 2021: 8 ML/MIN/1.73
EOSINOPHIL # BLD AUTO: 0.03 10*3/MM3 (ref 0–0.4)
EOSINOPHIL NFR BLD AUTO: 0.3 % (ref 0.3–6.2)
ERYTHROCYTE [DISTWIDTH] IN BLOOD BY AUTOMATED COUNT: 15.3 % (ref 12.3–15.4)
GLUCOSE BLDC GLUCOMTR-MCNC: 121 MG/DL (ref 70–99)
GLUCOSE BLDC GLUCOMTR-MCNC: 134 MG/DL (ref 70–99)
GLUCOSE BLDC GLUCOMTR-MCNC: 161 MG/DL (ref 70–99)
GLUCOSE BLDC GLUCOMTR-MCNC: 88 MG/DL (ref 70–99)
GLUCOSE SERPL-MCNC: 101 MG/DL (ref 65–99)
HCT VFR BLD AUTO: 34.1 % (ref 37.5–51)
HGB BLD-MCNC: 10.9 G/DL (ref 13–17.7)
IMM GRANULOCYTES # BLD AUTO: 0.08 10*3/MM3 (ref 0–0.05)
IMM GRANULOCYTES NFR BLD AUTO: 0.8 % (ref 0–0.5)
LYMPHOCYTES # BLD AUTO: 3.17 10*3/MM3 (ref 0.7–3.1)
LYMPHOCYTES NFR BLD AUTO: 33.2 % (ref 19.6–45.3)
MAGNESIUM SERPL-MCNC: 1.7 MG/DL (ref 1.6–2.4)
MCH RBC QN AUTO: 30.8 PG (ref 26.6–33)
MCHC RBC AUTO-ENTMCNC: 32 G/DL (ref 31.5–35.7)
MCV RBC AUTO: 96.3 FL (ref 79–97)
MONOCYTES # BLD AUTO: 1.12 10*3/MM3 (ref 0.1–0.9)
MONOCYTES NFR BLD AUTO: 11.7 % (ref 5–12)
NEUTROPHILS NFR BLD AUTO: 5.12 10*3/MM3 (ref 1.7–7)
NEUTROPHILS NFR BLD AUTO: 53.7 % (ref 42.7–76)
NRBC BLD AUTO-RTO: 0 /100 WBC (ref 0–0.2)
PHOSPHATE SERPL-MCNC: 6.5 MG/DL (ref 2.5–4.5)
PLATELET # BLD AUTO: 229 10*3/MM3 (ref 140–450)
PMV BLD AUTO: 11 FL (ref 6–12)
POTASSIUM SERPL-SCNC: 4 MMOL/L (ref 3.5–5.2)
PROT SERPL-MCNC: 6.5 G/DL (ref 6–8.5)
RBC # BLD AUTO: 3.54 10*6/MM3 (ref 4.14–5.8)
SODIUM SERPL-SCNC: 140 MMOL/L (ref 136–145)
WBC NRBC COR # BLD AUTO: 9.55 10*3/MM3 (ref 3.4–10.8)

## 2024-09-05 PROCEDURE — 94761 N-INVAS EAR/PLS OXIMETRY MLT: CPT

## 2024-09-05 PROCEDURE — 99233 SBSQ HOSP IP/OBS HIGH 50: CPT | Performed by: INTERNAL MEDICINE

## 2024-09-05 PROCEDURE — 80076 HEPATIC FUNCTION PANEL: CPT | Performed by: FAMILY MEDICINE

## 2024-09-05 PROCEDURE — 63710000001 INSULIN LISPRO (HUMAN) PER 5 UNITS: Performed by: FAMILY MEDICINE

## 2024-09-05 PROCEDURE — 83735 ASSAY OF MAGNESIUM: CPT | Performed by: FAMILY MEDICINE

## 2024-09-05 PROCEDURE — 25010000002 CEFTRIAXONE PER 250 MG: Performed by: FAMILY MEDICINE

## 2024-09-05 PROCEDURE — 94799 UNLISTED PULMONARY SVC/PX: CPT

## 2024-09-05 PROCEDURE — 63710000001 PREDNISONE PER 1 MG: Performed by: FAMILY MEDICINE

## 2024-09-05 PROCEDURE — 84100 ASSAY OF PHOSPHORUS: CPT | Performed by: FAMILY MEDICINE

## 2024-09-05 PROCEDURE — 99232 SBSQ HOSP IP/OBS MODERATE 35: CPT | Performed by: FAMILY MEDICINE

## 2024-09-05 PROCEDURE — 85025 COMPLETE CBC W/AUTO DIFF WBC: CPT | Performed by: FAMILY MEDICINE

## 2024-09-05 PROCEDURE — 82948 REAGENT STRIP/BLOOD GLUCOSE: CPT

## 2024-09-05 PROCEDURE — 82948 REAGENT STRIP/BLOOD GLUCOSE: CPT | Performed by: FAMILY MEDICINE

## 2024-09-05 PROCEDURE — 80048 BASIC METABOLIC PNL TOTAL CA: CPT | Performed by: FAMILY MEDICINE

## 2024-09-05 PROCEDURE — 94664 DEMO&/EVAL PT USE INHALER: CPT

## 2024-09-05 RX ORDER — ACETAMINOPHEN 325 MG/1
650 TABLET ORAL EVERY 6 HOURS PRN
Status: DISCONTINUED | OUTPATIENT
Start: 2024-09-05 | End: 2024-09-10 | Stop reason: HOSPADM

## 2024-09-05 RX ADMIN — PREDNISONE 40 MG: 20 TABLET ORAL at 08:24

## 2024-09-05 RX ADMIN — IPRATROPIUM BROMIDE AND ALBUTEROL SULFATE 3 ML: .5; 3 SOLUTION RESPIRATORY (INHALATION) at 19:00

## 2024-09-05 RX ADMIN — APIXABAN 2.5 MG: 2.5 TABLET, FILM COATED ORAL at 08:24

## 2024-09-05 RX ADMIN — Medication 1 APPLICATION: at 14:34

## 2024-09-05 RX ADMIN — INSULIN LISPRO 2 UNITS: 100 INJECTION, SOLUTION INTRAVENOUS; SUBCUTANEOUS at 17:43

## 2024-09-05 RX ADMIN — AZITHROMYCIN DIHYDRATE 250 MG: 250 TABLET ORAL at 13:16

## 2024-09-05 RX ADMIN — Medication 10 ML: at 08:24

## 2024-09-05 RX ADMIN — CEFTRIAXONE SODIUM 1000 MG: 1 INJECTION, POWDER, FOR SOLUTION INTRAMUSCULAR; INTRAVENOUS at 13:16

## 2024-09-05 RX ADMIN — GUAIFENESIN 600 MG: 600 TABLET ORAL at 08:24

## 2024-09-05 RX ADMIN — ARFORMOTEROL TARTRATE 15 MCG: 15 SOLUTION RESPIRATORY (INHALATION) at 06:55

## 2024-09-05 RX ADMIN — IPRATROPIUM BROMIDE AND ALBUTEROL SULFATE 3 ML: .5; 3 SOLUTION RESPIRATORY (INHALATION) at 00:22

## 2024-09-05 RX ADMIN — GABAPENTIN 100 MG: 100 CAPSULE ORAL at 06:16

## 2024-09-05 RX ADMIN — FERROUS SULFATE TAB 325 MG (65 MG ELEMENTAL FE) 325 MG: 325 (65 FE) TAB at 08:24

## 2024-09-05 RX ADMIN — APIXABAN 2.5 MG: 2.5 TABLET, FILM COATED ORAL at 20:57

## 2024-09-05 RX ADMIN — LEVOTHYROXINE SODIUM 125 MCG: 125 TABLET ORAL at 06:16

## 2024-09-05 RX ADMIN — Medication 1 APPLICATION: at 21:03

## 2024-09-05 RX ADMIN — PANTOPRAZOLE SODIUM 40 MG: 40 TABLET, DELAYED RELEASE ORAL at 08:24

## 2024-09-05 RX ADMIN — ARFORMOTEROL TARTRATE 15 MCG: 15 SOLUTION RESPIRATORY (INHALATION) at 19:00

## 2024-09-05 RX ADMIN — ACETAMINOPHEN 650 MG: 325 TABLET ORAL at 15:24

## 2024-09-05 RX ADMIN — GUAIFENESIN 600 MG: 600 TABLET ORAL at 20:58

## 2024-09-05 RX ADMIN — BUDESONIDE 0.5 MG: 0.5 SUSPENSION RESPIRATORY (INHALATION) at 19:00

## 2024-09-05 RX ADMIN — Medication 10 ML: at 20:57

## 2024-09-05 RX ADMIN — IPRATROPIUM BROMIDE AND ALBUTEROL SULFATE 3 ML: .5; 3 SOLUTION RESPIRATORY (INHALATION) at 13:29

## 2024-09-05 RX ADMIN — CARVEDILOL 9.38 MG: 6.25 TABLET, FILM COATED ORAL at 17:39

## 2024-09-05 RX ADMIN — CARVEDILOL 9.38 MG: 6.25 TABLET, FILM COATED ORAL at 08:24

## 2024-09-05 RX ADMIN — ATORVASTATIN CALCIUM 10 MG: 10 TABLET, FILM COATED ORAL at 20:57

## 2024-09-05 RX ADMIN — IPRATROPIUM BROMIDE AND ALBUTEROL SULFATE 3 ML: .5; 3 SOLUTION RESPIRATORY (INHALATION) at 06:55

## 2024-09-05 RX ADMIN — BUDESONIDE 0.5 MG: 0.5 SUSPENSION RESPIRATORY (INHALATION) at 06:55

## 2024-09-05 NOTE — PLAN OF CARE
Goal Outcome Evaluation:  Plan of Care Reviewed With: patient           Outcome Evaluation: Orientated to person and place during shift, confused, pulling at equipment, attempting to get oob several times.  MD contacted x2 regarding behavior.  recived new orders.  Patient began resting around 2am and slept for about an hour, then awake and started removing clothing.  VS remained stable, no s/s of distress.  Refused Bipap at HS due to increased aggitation.

## 2024-09-05 NOTE — PROGRESS NOTES
" LOS: 3 days   Patient Care Team:  Yessica Sr APRN as PCP - General (Family Medicine)    Chief Complaint: ESRD    Subjective     History of Present Illness  Pt without any new  complaints  More confused today.      Subjective    History taken from: patient chart RN    Objective     Vital Sign Min/Max for last 24 hours  Temp  Min: 96.9 °F (36.1 °C)  Max: 98.2 °F (36.8 °C)   BP  Min: 91/62  Max: 155/66   Pulse  Min: 73  Max: 82   Resp  Min: 16  Max: 24   SpO2  Min: 91 %  Max: 100 %   Flow (L/min)  Min: 2  Max: 2   No data recorded     Flowsheet Rows      Flowsheet Row First Filed Value   Admission Height 180.3 cm (71\") Documented at 09/02/2024 2147   Admission Weight 117 kg (257 lb 15 oz) Documented at 09/02/2024 2059            No intake/output data recorded.  I/O last 3 completed shifts:  In: 420 [P.O.:420]  Out: 3000     Objective:  General Appearance:  Comfortable.    Vital signs: (most recent): Blood pressure 117/66, pulse 75, temperature 97.7 °F (36.5 °C), temperature source Oral, resp. rate 24, height 180.3 cm (71\"), weight 117 kg (257 lb 15 oz), SpO2 93%.  Vital signs are normal.    HEENT: Normal HEENT exam.    Lungs:  Normal effort and normal respiratory rate.    Heart: Normal rate.  Regular rhythm.    Abdomen: Abdomen is soft.  Bowel sounds are normal.   There is no abdominal tenderness.     Extremities: Normal range of motion.  There is no dependent edema.    Pulses: Distal pulses are intact.    Neurological: Patient is alert and oriented to person, place and time.    Pupils:  Pupils are equal, round, and reactive to light.    Skin:  Warm and dry.                Results Review:     I reviewed the patient's new clinical results.  I reviewed the patient's new imaging results and agree with the interpretation.  I reviewed the patient's other test results and agree with the interpretation    WBC WBC   Date Value Ref Range Status   09/05/2024 9.55 3.40 - 10.80 10*3/mm3 Final   09/04/2024 7.76 3.40 " "- 10.80 10*3/mm3 Final   09/02/2024 8.84 3.40 - 10.80 10*3/mm3 Final      HGB Hemoglobin   Date Value Ref Range Status   09/05/2024 10.9 (L) 13.0 - 17.7 g/dL Final   09/04/2024 11.1 (L) 13.0 - 17.7 g/dL Final   09/02/2024 11.0 (L) 13.0 - 17.7 g/dL Final      HCT Hematocrit   Date Value Ref Range Status   09/05/2024 34.1 (L) 37.5 - 51.0 % Final   09/04/2024 35.9 (L) 37.5 - 51.0 % Final   09/02/2024 34.9 (L) 37.5 - 51.0 % Final      Platlets No results found for: \"LABPLAT\"   MCV MCV   Date Value Ref Range Status   09/05/2024 96.3 79.0 - 97.0 fL Final   09/04/2024 99.2 (H) 79.0 - 97.0 fL Final   09/02/2024 98.0 (H) 79.0 - 97.0 fL Final          Sodium Sodium   Date Value Ref Range Status   09/05/2024 140 136 - 145 mmol/L Final   09/04/2024 137 136 - 145 mmol/L Final   09/02/2024 140 136 - 145 mmol/L Final      Potassium Potassium   Date Value Ref Range Status   09/05/2024 4.0 3.5 - 5.2 mmol/L Final   09/04/2024 5.4 (H) 3.5 - 5.2 mmol/L Final   09/02/2024 5.1 3.5 - 5.2 mmol/L Final      Chloride Chloride   Date Value Ref Range Status   09/05/2024 99 98 - 107 mmol/L Final   09/04/2024 97 (L) 98 - 107 mmol/L Final   09/02/2024 100 98 - 107 mmol/L Final      CO2 CO2   Date Value Ref Range Status   09/05/2024 26.4 22.0 - 29.0 mmol/L Final   09/04/2024 20.6 (L) 22.0 - 29.0 mmol/L Final   09/02/2024 23.2 22.0 - 29.0 mmol/L Final      BUN BUN   Date Value Ref Range Status   09/05/2024 48 (H) 8 - 23 mg/dL Final   09/04/2024 79 (H) 8 - 23 mg/dL Final   09/02/2024 66 (H) 8 - 23 mg/dL Final      Creatinine Creatinine   Date Value Ref Range Status   09/05/2024 6.70 (H) 0.76 - 1.27 mg/dL Final   09/04/2024 10.01 (H) 0.76 - 1.27 mg/dL Final   09/02/2024 9.88 (H) 0.76 - 1.27 mg/dL Final      Calcium Calcium   Date Value Ref Range Status   09/05/2024 8.0 (L) 8.6 - 10.5 mg/dL Final   09/04/2024 8.0 (L) 8.6 - 10.5 mg/dL Final   09/02/2024 7.8 (L) 8.6 - 10.5 mg/dL Final      PO4 No results found for: \"CAPO4\"   Albumin Albumin   Date Value " "Ref Range Status   09/05/2024 2.8 (L) 3.5 - 5.2 g/dL Final   09/04/2024 2.8 (L) 3.5 - 5.2 g/dL Final   09/02/2024 2.8 (L) 3.5 - 5.2 g/dL Final      Magnesium Magnesium   Date Value Ref Range Status   09/05/2024 1.7 1.6 - 2.4 mg/dL Final   09/04/2024 2.0 1.6 - 2.4 mg/dL Final   09/02/2024 2.0 1.6 - 2.4 mg/dL Final      Uric Acid No results found for: \"URICACID\"     Medication Review:   apixaban, 2.5 mg, Oral, BID  arformoterol, 15 mcg, Nebulization, BID - RT  atorvastatin, 10 mg, Oral, Nightly  azithromycin, 250 mg, Oral, Q24H  budesonide, 0.5 mg, Nebulization, BID - RT  carvedilol, 9.375 mg, Oral, BID With Meals  cefTRIAXone, 1,000 mg, Intravenous, Q24H  dimethicone, 1 Application, Topical, 2 times per day  ferrous sulfate, 325 mg, Oral, Daily With Breakfast  gabapentin, 100 mg, Oral, QAM  guaiFENesin, 600 mg, Oral, Q12H  insulin lispro, 2-7 Units, Subcutaneous, 4x Daily AC & at Bedtime  ipratropium-albuterol, 3 mL, Nebulization, Q6H - RT  levothyroxine, 125 mcg, Oral, Q AM  pantoprazole, 40 mg, Oral, Daily  predniSONE, 40 mg, Oral, Daily With Breakfast  sodium chloride, 10 mL, Intravenous, Q12H          Assessment & Plan       COPD with acute exacerbation    Hyperkalemia    Acute on chronic respiratory failure    ESRD (end stage renal disease)    HTN (hypertension)    DM2 (diabetes mellitus, type 2)    CAD (coronary artery disease)      Assessment & Plan  ESRD-  dialysis again today and continue regular schedule.  Continue m/w/f schedule.  RIJ TDC for Access.  Did cathflo due to poor catheter function.     HTN-  controlled.     DMII-  diabetic diet.     PNA-  + covid.  IV abx started also for mycoplasma also.    Nain Wetzel MD  09/05/24  08:03 EDT          "

## 2024-09-05 NOTE — NURSING NOTE
PRN meds given but not effective.  Patient cont with confusion, anxiety, increase risk for danger ing self.  ODALIS Whipple notified of no changes in status.

## 2024-09-05 NOTE — SIGNIFICANT NOTE
09/05/24 0914   OTHER   Discipline occupational therapist   Rehab Time/Intention   Session Not Performed patient unavailable for treatment  (With staff)

## 2024-09-05 NOTE — PROGRESS NOTES
[facility]   Hospitalist Progress Note  Date: 2024  Patient Name: Pedro Tilley  : 1945  MRN: [id number]  Date of admission: 2024      Subjective   Subjective       Chief complaint: Shortness of breath, generalized weakness    Summary:  78-year-old male with end-stage renal disease on hemodialysis, diabetes, hypertension, CAD, COPD, GERD without esophagitis, chronic diastolic heart failure, hospitalized on 2024 as a transfer from an outside facility for weakness and shortness of breath/chief complaint, he was placed on antibiotics after chest x-ray showed pneumonia and he is found to be COVID-19 positive, he was hypoxemic upon transfer to our facility, he tested positive for mycoplasma, started on azithromycin, steroids, pulmonary consulted, nephrology consulted.  Overnight sundowning, confused, pulling at lines and tubes, received sedating medications    Interval follow-up: Seen and examined this morning, no acute distress, overnight sundowning, resulting in patient receiving sedating medications, this morning very lethargic and difficult to arouse but was arousable, slowed speech, likely delayed clearance.  White blood cell count 9000, hemoglobin 10.9, creatinine 6.7, BUN 48, potassium 4.0.  Telemetry reviewed, paced rhythm 75 bpm.    Review of systems:  All systems reviewed and negative except for weakness, fatigue, cough, shortness of breath with exertional activity, sundowning, intermittent confusion    Objective   Objective     Vitals:   Temp:  [96.9 °F (36.1 °C)-97.9 °F (36.6 °C)] 97.7 °F (36.5 °C)  Heart Rate:  [75] 75  Resp:  [16-24] 24  BP: (117-141)/(55-79) 124/55  Flow (L/min):  [2] 2  Physical Exam                 Constitutional: Awake, alert, ill-appearing lying in bed              Eyes: PERRLA, sclerae anicteric, no conjunctival injection              HENT: NCAT, mucous membranes moist              Neck: Supple, full range of motion              Respiratory: No wheezing or  rhonchi, no tachypnea, on nasal cannula              Cardiovascular: RRR, no murmurs, rubs, or gallops, palpable pedal pulses bilaterally              Gastrointestinal: Positive bowel sounds, soft, nontender, nondistended              Musculoskeletal: Bilateral lower extremity edema, no clubbing or cyanosis to extremities              Psychiatric: Appropriate affect, cooperative              Neurologic: Oriented x 2, sleepy, strength symmetric in all extremities, Cranial Nerves grossly intact to confrontation, speech mumbles but understandable              Skin: No rashes         Result Review    Result Review:  I have personally reviewed the pertinent results from the past 24 hours to 9/5/2024 15:03 EDT and agree with these findings:  [x]  Laboratory   CBC          9/2/2024    21:32 9/4/2024    05:19 9/5/2024    05:06   CBC   WBC 8.84  7.76  9.55    RBC 3.56  3.62  3.54    Hemoglobin 11.0  11.1  10.9    Hematocrit 34.9  35.9  34.1    MCV 98.0  99.2  96.3    MCH 30.9  30.7  30.8    MCHC 31.5  30.9  32.0    RDW 15.9  15.7  15.3    Platelets 237  234  229      BMP          9/2/2024    21:32 9/4/2024    05:19 9/5/2024    05:06   BMP   BUN 66  79  48    Creatinine 9.88  10.01  6.70    Sodium 140  137  140    Potassium 5.1  5.4  4.0    Chloride 100  97  99    CO2 23.2  20.6  26.4    Calcium 7.8  8.0  8.0      LIVER FUNCTION TESTS:      Lab 09/05/24  0506 09/04/24  0519 09/02/24  2132   TOTAL PROTEIN 6.5 6.8 6.7   ALBUMIN 2.8* 2.8* 2.8*   GLOBULIN  --   --  3.9   ALT (SGPT) 6 5 5   AST (SGOT) 24 22 25   BILIRUBIN 0.4 0.4 0.5   INDIRECT BILIRUBIN 0.1 0.2  --    BILIRUBIN DIRECT 0.3 0.2  --    ALK PHOS 77 137* 119*       [x]  Microbiology   Microbiology Results (last 10 days)       Procedure Component Value - Date/Time    S. Pneumo Ag Urine or CSF - Urine, Urine, Clean Catch [[id number]]  (Normal) Collected: 09/03/24 1207    Lab Status: Final result Specimen: Urine, Clean Catch Updated: 09/03/24 1302     Strep Pneumo Ag  Negative    Legionella Antigen, Urine - Urine, Urine, Clean Catch [[id number]]  (Normal) Collected: 09/03/24 1207    Lab Status: Final result Specimen: Urine, Clean Catch Updated: 09/03/24 1302     LEGIONELLA ANTIGEN, URINE Negative    Mycoplasma Pneumoniae Antibody, IgM - Blood, Arm, Left [[id number]]  (Abnormal) Collected: 09/03/24 0806    Lab Status: Final result Specimen: Blood from Arm, Left Updated: 09/03/24 0914     Mycoplasma pneumo IgM Positive              [x]  Radiology CT Chest Without Contrast Diagnostic    Result Date: 9/3/2024  Impression: 1. Findings suggest congestive heart failure with small pleural effusions. Mild airspace consolidation in the lower lung fields is likely atelectasis. Superimposed pneumonia is also possible. 2. Cholelithiasis. 3. Mild stranding around the pancreas. Suggest correlation with any history of acute pancreatitis. 4. Hypodense right renal masses measuring up to 2 cm possibly representing cysts. They're indeterminate without contrast. 5. 4 cm duodenal diverticulum. Electronically Signed: Matthew David MD  9/3/2024 11:06 AM EDT  Workstation ID: [id number]    XR Chest 1 View    Result Date: 9/2/2024  Stable cardiac enlargement with chronic changes in the lung bases. Electronically Signed: Lam Reed MD  9/2/2024 9:34 PM EDT  Workstation ID: [id number]       [x]  EKG/Telemetry   ECG 12 Lead Electrolyte Imbalance   Final Result   HEART RATE=75  bpm   RR Skgvkzyp=234  ms   MS Interval=  ms   P Horizontal Axis=60  deg   P Front Axis=  deg   QRSD Hjdhqcrh=595  ms   QT Dctsmbuc=546  ms   PTtS=137  ms   QRS Axis=235  deg   T Wave Axis=71  deg   - ABNORMAL ECG -   Afib/flutter and ventricular-paced rhythm   Biventricular paced rhythm   No further analysis attempted due to paced rhythm   Electronically Signed By: Jerson Perry ([facility]) 2024-09-04 11:48:59   Date and Time of Study:2024-09-03 20:22:48      ECG 12 Lead Electrolyte Imbalance   Final Result   HEART RATE=75  bpm   RR  Cdwbxjey=241  ms   AR Interval=  ms   P Horizontal Axis=  deg   P Front Axis=  deg   QRSD Jkpzsrms=709  ms   QT Qehkdatv=057  ms   NKqX=416  ms   QRS Axis=244  deg   T Wave Axis=57  deg   - ABNORMAL ECG -   Afib/flutter and ventricular-paced rhythm   Biventricular paced rhythm   No further analysis attempted due to paced rhythm   Electronically Signed By: Bill Rowe ([facility]) 2024-09-03 17:57:13   Date and Time of Study:2024-09-02 21:33:59          []  Cardiology/Vascular   []  Pathology  [x]  Old records  []  Other:    Assessment & Plan   Assessment / Plan     Assessment/Plan:  Assessment:  Acute hypoxemic respiratory failure  COPD with acute exacerbation    SARS-CoV-2 infection  COVID-19 causing lower respiratory tract infection  Mycoplasma pneumonia  Acute on chronic respiratory failure    ESRD (end stage renal disease)    Paroxysmal atrial fibrillation  HTN (hypertension)    DM2 (diabetes mellitus, type 2)    CAD (coronary artery disease)    Hyperkalemia      Plan:  Labs and imaging reviewed  Avoid sedating medications in this end-stage renal patient, will result in delayed clearance and subsequent prolonged lethargy  Consulted pulmonary discussed with Dr. Nicole, recommendations appreciated, breathing has improved, almost on antibiotic course  Continue azithromycin for treatment for mycoplasma pneumonia for 5 days total  Continue ceftriaxone for 5 days total  Continue prednisone 40 mg daily to complete 5 days  Start Brovana Pulmicort nebs twice daily  Coreg resumed at higher dose at 9.375 mg p.o. twice daily  Continue Lipitor 10 mg nightly  Continue reduced dose gabapentin at 100 mg daily  Continue Protonix 40 mg daily  Wean oxygen per tolerance  Nephrology consulted for dialysis orders  PT/OT  AM labs  Full code  DVT prophylaxis Eliquis  Clinical course to dictate further management  Discussed with nurse at the bedside    VTE Prophylaxis:  Pharmacologic VTE prophylaxis orders are present.        CODE STATUS:    Level Of Support Discussed With: Patient  Code Status (Patient has no pulse and is not breathing): CPR (Attempt to Resuscitate)  Medical Interventions (Patient has pulse or is breathing): Full Support        Electronically signed by Kana Tavares MD, 9/5/2024, 15:03 EDT.    Portions of this documentation were transcribed electronically from a voice recognition software.  I confirm all data accurately represents the service(s) I performed at today's visit.

## 2024-09-05 NOTE — PROGRESS NOTES
Pulmonary / Critical Care Progress Note      Patient Name: Pedro Tilley  : 1945  MRN: [id number]  Primary Care Physician:  Yessica Sr, WENDY  Date of admission: 2024    Subjective   Subjective   Follow-up for acute on chronic COPD exacerbation, COVID-19 respiratory infection    Over past 24 hours: Underwent dialysis, remains on azithromycin and ceftriaxone, transition to prednisone, remains on nebulizers.    No acute events overnight.     This morning,   Lying in bed  Remains on 2 L nasal cannula  Dyspnea improving  Overall feeling better  Cough improving  3 L removed in dialysis yesterday  Potassium normalized  No fever or chills      Objective   Objective     Vitals:   Temp:  [96.9 °F (36.1 °C)-98.2 °F (36.8 °C)] 97.9 °F (36.6 °C)  Heart Rate:  [73-82] 75  Resp:  [16-24] 24  BP: ()/(43-79) 134/59  Flow (L/min):  [2] 2    Physical Exam   Vital Signs Reviewed   General: Chronically ill-appearing male, Alert, NAD, lying in bed.    Chest:  good aeration, scattered rhonchi with Velcro-like crackles to auscultation bilaterally, no work of breathing noted on 2 L NC  CV: RRR, no MGR, pulses 2+, equal.  Abd:  Soft, NT, ND, + BS, no HSM  EXT:  no clubbing, no cyanosis, no edema  Neuro:  A&Ox3, CN grossly intact, no focal deficits.  Skin: No rashes or lesions noted      Result Review    Result Review:  I have personally reviewed the results from the time of this admission to 2024 07:35 EDT and agree with these findings:  [x]  Laboratory  [x]  Microbiology  [x]  Radiology  [x]  EKG/Telemetry   [x]  Cardiology/Vascular   []  Pathology  []  Old records  []  Other:  Most notable findings include:   proBNP 30,538      Lab 24  0506 24  0519 24  2132   WBC 9.55 7.76 8.84   HEMOGLOBIN 10.9* 11.1* 11.0*   HEMATOCRIT 34.1* 35.9* 34.9*   PLATELETS 229 234 237   SODIUM 140 137 140   POTASSIUM 4.0 5.4* 5.1   CHLORIDE 99 97* 100   CO2 26.4 20.6* 23.2   BUN 48* 79* 66*   CREATININE 6.70*  10.01* 9.88*   GLUCOSE 101* 238* 114*   CALCIUM 8.0* 8.0* 7.8*   PHOSPHORUS 6.5* 9.6* 8.7*   TOTAL PROTEIN 6.5 6.8 6.7   ALBUMIN 2.8* 2.8* 2.8*   GLOBULIN  --   --  3.9     CT Chest Without Contrast Diagnostic    Result Date: 9/3/2024  CT CHEST WO CONTRAST DIAGNOSTIC Date of Exam: 9/3/2024 9:00 AM EDT Indication: hypercapnea with acute hypoxemia. Comparison: 2/20/2020 Technique: Axial CT images were obtained of the chest without contrast administration.  Reconstructed coronal and sagittal images were also obtained. Automated exposure control and iterative construction methods were used. Findings: Cardiomegaly is present. There is minimal pericardial fluid. There are small pleural effusions. There is interlobular septal thickening in the lower lung fields consistent with pulmonary edema. There is mild patchy airspace consolidation likely atelectasis. Pneumonia is also possible. There are scattered calcified granulomas. There is no mediastinal, axillary or hilar adenopathy. There is a left-sided cardiac pacemaker/AICD. There is a right IJ central venous catheter. Gallstones are present in  the gallbladder. The left kidney is not visualized and may be surgically absent. Surgical clips are present in the left upper quadrant. There are a few hypodense masses in the visualized right kidney measuring up to 2 cm. They may represent cysts. They are indeterminate without contrast. There is bilateral gynecomastia. Degenerative changes are present in the thoracic spine. There is a 4 cm duodenal diverticulum. There is mild stranding around the pancreas.     Impression: Impression: 1. Findings suggest congestive heart failure with small pleural effusions. Mild airspace consolidation in the lower lung fields is likely atelectasis. Superimposed pneumonia is also possible. 2. Cholelithiasis. 3. Mild stranding around the pancreas. Suggest correlation with any history of acute pancreatitis. 4. Hypodense right renal masses measuring up  to 2 cm possibly representing cysts. They're indeterminate without contrast. 5. 4 cm duodenal diverticulum. Electronically Signed: Matthew David MD  9/3/2024 11:06 AM EDT  Workstation ID: [id number]     Assessment & Plan   Assessment / Plan     Active Hospital Problems:  Active Hospital Problems    Diagnosis     **COPD with acute exacerbation     Acute on chronic respiratory failure     ESRD (end stage renal disease)     HTN (hypertension)     DM2 (diabetes mellitus, type 2)     CAD (coronary artery disease)     Hyperkalemia      Impression:   Acute on chronic hypoxic respiratory failure requiring supplemental oxygen  Acute on chronic COPD exacerbation  Mycoplasma pneumonia  COVID-19 respiratory tract infection  ESRD on dialysis  Elevated proBNP, 30,538  HFpEF  A-fib on Eliquis    Plan:   -Currently on 2 L nasal cannula.  Continue to wean O2 to maintain SpO2 greater than 90%  -CT chest reviewed revealing small bilateral pleural effusions with mild airspace consolidation in lower lung field likely related to atelectasis.  Superimposed pneumonia also possible.  -Continue azithromycin and ceftriaxone for pneumonia  -Mycoplasma IgM positive.  S pneumo and Legionella negative.  Sputum culture pending.  -Continue prednisone 40 mg daily  -Continue Brovana, Pulmicort, DuoNebs  -Continue bronchopulmonary hygiene.  Encourage I-S and flutter  -Encourage activity as tolerated  -Continue Eliquis for A-fib  -Trend electrolytes and renal panel.  Replace electrolytes as needed.  -Nephrology on board.  Appreciate assistance. HD timing per nephrology.  -PT/OT on board.  Appreciate assistance  -Pulmonary rehab consulted        VTE Prophylaxis:  Pharmacologic VTE prophylaxis orders are present.    CODE STATUS:   Level Of Support Discussed With: Patient  Code Status (Patient has no pulse and is not breathing): CPR (Attempt to Resuscitate)  Medical Interventions (Patient has pulse or is breathing): Full Support      I personally reviewed  pertinent labs, imaging and provider notes. Discussed with bedside nurse and will discuss with primary service.     Electronically signed by Sagar Nicole MD, 9/5/2024, 07:35 EDT.    Electronically signed by WENDY Villalobos, 09/05/24, 3:07 PM EDT.    This visit was performed by BOTH a physician and an APC. I personally evaluated and examined the patient. I performed all aspects of MDM as documented. , I have reviewed and confirmed the accuracy of the patient's history as documented in this note., and I have reexamined the patient and the results are consistent with the previously documented exam. I have updated the documentation as necessary.     Electronically signed by Sagar Nicole MD, 09/05/24, 5:06 PM EDT.

## 2024-09-05 NOTE — NURSING NOTE
Patient is confused, pulling and removing equipment, attempting to get oob without assist multiple times, increased risk for falls.  Difficult to keep o2 on patient with sat's dropping into the 80's.  M. Whipple to be notified per secure chat with updates

## 2024-09-05 NOTE — PROGRESS NOTES
RT EQUIPMENT DEVICE RELATED - SKIN ASSESSMENT    RT Medical Equipment/Device:     NIV Mask:  Under-the-nose   size:  B    Skin Assessment:      Cheek:  Intact  Chin:  Intact  Ears:  Intact  Neck:  Intact  Nose:  Intact  Mouth:  Intact    Device Skin Pressure Protection:   Patient wearing nasal cannula at this time.    Nurse Notification:  Ernestina Arias, CRT

## 2024-09-05 NOTE — PLAN OF CARE
Goal Outcome Evaluation:  Plan of Care Reviewed With: patient        Progress: improving  Outcome Evaluation: Patient is alert to self. Patient has been very disoriented this shift. Patient has rested on and off throughout the day. 2 L nasal cannula. Patients wife will stay the night tonight to help with confusion and restlessness. Continuing plan on care.

## 2024-09-05 NOTE — SIGNIFICANT NOTE
09/05/24 1000   Physical Therapy Time and Intention   Session Not Performed patient/family declined evaluation;other (see comments)  (pt was difficult to arouse, upon awakening a little he declined to partiicipate)

## 2024-09-06 LAB
ALBUMIN SERPL-MCNC: 2.8 G/DL (ref 3.5–5.2)
ALP SERPL-CCNC: 65 U/L (ref 39–117)
ALT SERPL W P-5'-P-CCNC: 6 U/L (ref 1–41)
ANION GAP SERPL CALCULATED.3IONS-SCNC: 14.9 MMOL/L (ref 5–15)
AST SERPL-CCNC: 25 U/L (ref 1–40)
ATMOSPHERIC PRESS: 744.3 MMHG
BASE EXCESS BLDV CALC-SCNC: 6 MMOL/L (ref -2–2)
BASOPHILS # BLD AUTO: 0.04 10*3/MM3 (ref 0–0.2)
BASOPHILS NFR BLD AUTO: 0.4 % (ref 0–1.5)
BDY SITE: ABNORMAL
BILIRUB CONJ SERPL-MCNC: 0.3 MG/DL (ref 0–0.3)
BILIRUB INDIRECT SERPL-MCNC: 0.2 MG/DL
BILIRUB SERPL-MCNC: 0.5 MG/DL (ref 0–1.2)
BUN SERPL-MCNC: 65 MG/DL (ref 8–23)
BUN/CREAT SERPL: 8.2 (ref 7–25)
CALCIUM SPEC-SCNC: 8.1 MG/DL (ref 8.6–10.5)
CHLORIDE SERPL-SCNC: 97 MMOL/L (ref 98–107)
CO2 SERPL-SCNC: 24.1 MMOL/L (ref 22–29)
CREAT SERPL-MCNC: 7.91 MG/DL (ref 0.76–1.27)
DEPRECATED RDW RBC AUTO: 51.8 FL (ref 37–54)
EGFRCR SERPLBLD CKD-EPI 2021: 6.5 ML/MIN/1.73
EOSINOPHIL # BLD AUTO: 0.02 10*3/MM3 (ref 0–0.4)
EOSINOPHIL NFR BLD AUTO: 0.2 % (ref 0.3–6.2)
ERYTHROCYTE [DISTWIDTH] IN BLOOD BY AUTOMATED COUNT: 14.8 % (ref 12.3–15.4)
GLUCOSE BLDC GLUCOMTR-MCNC: 154 MG/DL (ref 70–99)
GLUCOSE BLDC GLUCOMTR-MCNC: 98 MG/DL (ref 70–99)
GLUCOSE SERPL-MCNC: 100 MG/DL (ref 65–99)
HCO3 BLDV-SCNC: 32.3 MMOL/L (ref 22–26)
HCT VFR BLD AUTO: 33.5 % (ref 37.5–51)
HGB BLD-MCNC: 10.8 G/DL (ref 13–17.7)
HGB BLDA-MCNC: 9.9 G/DL (ref 12–18)
IMM GRANULOCYTES # BLD AUTO: 0.07 10*3/MM3 (ref 0–0.05)
IMM GRANULOCYTES NFR BLD AUTO: 0.8 % (ref 0–0.5)
INHALED O2 CONCENTRATION: 21 %
LYMPHOCYTES # BLD AUTO: 3.33 10*3/MM3 (ref 0.7–3.1)
LYMPHOCYTES NFR BLD AUTO: 36.2 % (ref 19.6–45.3)
MAGNESIUM SERPL-MCNC: 1.7 MG/DL (ref 1.6–2.4)
MCH RBC QN AUTO: 30.4 PG (ref 26.6–33)
MCHC RBC AUTO-ENTMCNC: 32.2 G/DL (ref 31.5–35.7)
MCV RBC AUTO: 94.4 FL (ref 79–97)
MODALITY: ABNORMAL
MONOCYTES # BLD AUTO: 0.89 10*3/MM3 (ref 0.1–0.9)
MONOCYTES NFR BLD AUTO: 9.7 % (ref 5–12)
NEUTROPHILS NFR BLD AUTO: 4.85 10*3/MM3 (ref 1.7–7)
NEUTROPHILS NFR BLD AUTO: 52.7 % (ref 42.7–76)
NOTIFIED WHO: ABNORMAL
NRBC BLD AUTO-RTO: 0 /100 WBC (ref 0–0.2)
PCO2 BLDV: 55.2 MM HG (ref 41–51)
PH BLDV: 7.38 PH UNITS (ref 7.31–7.41)
PHOSPHATE SERPL-MCNC: 8.7 MG/DL (ref 2.5–4.5)
PLATELET # BLD AUTO: 232 10*3/MM3 (ref 140–450)
PMV BLD AUTO: 11.2 FL (ref 6–12)
PO2 BLDV: 26.2 MM HG (ref 35–42)
POTASSIUM SERPL-SCNC: 4.5 MMOL/L (ref 3.5–5.2)
PROT SERPL-MCNC: 6.6 G/DL (ref 6–8.5)
RBC # BLD AUTO: 3.55 10*6/MM3 (ref 4.14–5.8)
SAO2 % BLDCOV: 45.1 % (ref 45–75)
SODIUM SERPL-SCNC: 136 MMOL/L (ref 136–145)
WBC NRBC COR # BLD AUTO: 9.2 10*3/MM3 (ref 3.4–10.8)

## 2024-09-06 PROCEDURE — 99232 SBSQ HOSP IP/OBS MODERATE 35: CPT | Performed by: FAMILY MEDICINE

## 2024-09-06 PROCEDURE — 99232 SBSQ HOSP IP/OBS MODERATE 35: CPT | Performed by: INTERNAL MEDICINE

## 2024-09-06 PROCEDURE — 80048 BASIC METABOLIC PNL TOTAL CA: CPT | Performed by: FAMILY MEDICINE

## 2024-09-06 PROCEDURE — 82803 BLOOD GASES ANY COMBINATION: CPT

## 2024-09-06 PROCEDURE — 82948 REAGENT STRIP/BLOOD GLUCOSE: CPT | Performed by: FAMILY MEDICINE

## 2024-09-06 PROCEDURE — 84100 ASSAY OF PHOSPHORUS: CPT | Performed by: FAMILY MEDICINE

## 2024-09-06 PROCEDURE — 83735 ASSAY OF MAGNESIUM: CPT | Performed by: FAMILY MEDICINE

## 2024-09-06 PROCEDURE — 80076 HEPATIC FUNCTION PANEL: CPT | Performed by: FAMILY MEDICINE

## 2024-09-06 PROCEDURE — 94799 UNLISTED PULMONARY SVC/PX: CPT

## 2024-09-06 PROCEDURE — 82948 REAGENT STRIP/BLOOD GLUCOSE: CPT

## 2024-09-06 PROCEDURE — 94761 N-INVAS EAR/PLS OXIMETRY MLT: CPT

## 2024-09-06 PROCEDURE — 25010000002 CEFTRIAXONE PER 250 MG: Performed by: FAMILY MEDICINE

## 2024-09-06 PROCEDURE — 63710000001 INSULIN LISPRO (HUMAN) PER 5 UNITS: Performed by: FAMILY MEDICINE

## 2024-09-06 PROCEDURE — 94664 DEMO&/EVAL PT USE INHALER: CPT

## 2024-09-06 PROCEDURE — 25010000002 HEPARIN (PORCINE) PER 1000 UNITS: Performed by: INTERNAL MEDICINE

## 2024-09-06 PROCEDURE — 85025 COMPLETE CBC W/AUTO DIFF WBC: CPT | Performed by: FAMILY MEDICINE

## 2024-09-06 RX ORDER — HYDROCODONE BITARTRATE AND ACETAMINOPHEN 5; 325 MG/1; MG/1
1 TABLET ORAL EVERY 6 HOURS PRN
Status: DISCONTINUED | OUTPATIENT
Start: 2024-09-06 | End: 2024-09-07

## 2024-09-06 RX ADMIN — CARVEDILOL 9.38 MG: 6.25 TABLET, FILM COATED ORAL at 17:19

## 2024-09-06 RX ADMIN — ARFORMOTEROL TARTRATE 15 MCG: 15 SOLUTION RESPIRATORY (INHALATION) at 06:38

## 2024-09-06 RX ADMIN — ARFORMOTEROL TARTRATE 15 MCG: 15 SOLUTION RESPIRATORY (INHALATION) at 18:22

## 2024-09-06 RX ADMIN — BUDESONIDE 0.5 MG: 0.5 SUSPENSION RESPIRATORY (INHALATION) at 06:38

## 2024-09-06 RX ADMIN — GUAIFENESIN 600 MG: 600 TABLET ORAL at 21:30

## 2024-09-06 RX ADMIN — LEVOTHYROXINE SODIUM 125 MCG: 125 TABLET ORAL at 06:10

## 2024-09-06 RX ADMIN — APIXABAN 2.5 MG: 2.5 TABLET, FILM COATED ORAL at 21:30

## 2024-09-06 RX ADMIN — ATORVASTATIN CALCIUM 10 MG: 10 TABLET, FILM COATED ORAL at 21:30

## 2024-09-06 RX ADMIN — INSULIN LISPRO 2 UNITS: 100 INJECTION, SOLUTION INTRAVENOUS; SUBCUTANEOUS at 17:19

## 2024-09-06 RX ADMIN — CEFTRIAXONE SODIUM 1000 MG: 1 INJECTION, POWDER, FOR SOLUTION INTRAMUSCULAR; INTRAVENOUS at 14:37

## 2024-09-06 RX ADMIN — Medication 10 ML: at 21:30

## 2024-09-06 RX ADMIN — IPRATROPIUM BROMIDE AND ALBUTEROL SULFATE 3 ML: .5; 3 SOLUTION RESPIRATORY (INHALATION) at 18:22

## 2024-09-06 RX ADMIN — HYDROCODONE BITARTRATE AND ACETAMINOPHEN 1 TABLET: 5; 325 TABLET ORAL at 21:30

## 2024-09-06 RX ADMIN — BUDESONIDE 0.5 MG: 0.5 SUSPENSION RESPIRATORY (INHALATION) at 18:22

## 2024-09-06 RX ADMIN — HEPARIN SODIUM 3200 UNITS: 1000 INJECTION INTRAVENOUS; SUBCUTANEOUS at 13:00

## 2024-09-06 RX ADMIN — IPRATROPIUM BROMIDE AND ALBUTEROL SULFATE 3 ML: .5; 3 SOLUTION RESPIRATORY (INHALATION) at 06:38

## 2024-09-06 RX ADMIN — ACETAMINOPHEN 650 MG: 325 TABLET ORAL at 08:53

## 2024-09-06 RX ADMIN — AZITHROMYCIN DIHYDRATE 250 MG: 250 TABLET ORAL at 14:37

## 2024-09-06 RX ADMIN — HYDROCODONE BITARTRATE AND ACETAMINOPHEN 1 TABLET: 5; 325 TABLET ORAL at 10:42

## 2024-09-06 RX ADMIN — Medication 1 APPLICATION: at 21:32

## 2024-09-06 RX ADMIN — IPRATROPIUM BROMIDE AND ALBUTEROL SULFATE 3 ML: .5; 3 SOLUTION RESPIRATORY (INHALATION) at 00:12

## 2024-09-06 NOTE — PLAN OF CARE
Goal Outcome Evaluation:  Plan of Care Reviewed With: patient, spouse        Progress: no change  Outcome Evaluation: Patient is alert to self at this time. Patient went to dialysis earlier today. Patient has been trying to climb out of bed, rip out IVs, and has been repeating words. Messaged MD to make aware. No complaints of pain or discomfort at this time. Patient's wifecurrently at bedside. Continue with current plan of care.

## 2024-09-06 NOTE — PLAN OF CARE
Goal Outcome Evaluation:  Plan of Care Reviewed With: patient   Cont orientated to person only.  Pulled IV out during the night and replaced.  Wife remained at bedside.  No changes from assessment.          Outcome Evaluation: Orientated to person and place during shift, confused, pulling at equipment, attempting to get oob several times.  MD contacted x2 regarding behavior.  recived new orders.  Patient began resting around 2am and slept for about an hour, then awake and started removing clothing.  VS remained stable, no s/s of distress.  Refused Bipap at HS due to increased aggitation.

## 2024-09-06 NOTE — PLAN OF CARE
Goal Outcome Evaluation:  Plan of Care Reviewed With: patient           Outcome Evaluation: Patient resting wearing room air. The bipap is at bedside turned off. Patient has not been charted as wearung the bipap. No concerns at the time of this note. will continue to monitor.

## 2024-09-06 NOTE — SIGNIFICANT NOTE
09/06/24 0848   OTHER   Discipline occupational therapist   Rehab Time/Intention   Session Not Performed patient unavailable for evaluation  (off unit: dialysis)

## 2024-09-06 NOTE — PROGRESS NOTES
" LOS: 4 days   Patient Care Team:  Yessica Sr APRN as PCP - General (Family Medicine)    Chief Complaint: ESRD    Subjective     History of Present Illness  Pt seen on dialysis, more confused/somnolent but nursing tells me he just got norco for leg cramps.    Catheter running better after cathflo.    Subjective    History taken from: patient chart RN    Objective     Vital Sign Min/Max for last 24 hours  Temp  Min: 97.2 °F (36.2 °C)  Max: 98.1 °F (36.7 °C)   BP  Min: 107/48  Max: 135/64   Pulse  Min: 75  Max: 82   Resp  Min: 16  Max: 24   SpO2  Min: 84 %  Max: 100 %   Flow (L/min)  Min: 2  Max: 2   No data recorded     Flowsheet Rows      Flowsheet Row First Filed Value   Admission Height 180.3 cm (71\") Documented at 09/02/2024 2147   Admission Weight 117 kg (257 lb 15 oz) Documented at 09/02/2024 2059            No intake/output data recorded.  I/O last 3 completed shifts:  In: 660 [P.O.:660]  Out: 1 [Stool:1]    Objective:  General Appearance:  Comfortable.    Vital signs: (most recent): Blood pressure 125/64, pulse 75, temperature 97.2 °F (36.2 °C), temperature source Axillary, resp. rate 18, height 180.3 cm (71\"), weight 117 kg (257 lb 15 oz), SpO2 97%.  Vital signs are normal.    HEENT: Normal HEENT exam.    Lungs:  Normal effort and normal respiratory rate.    Heart: Normal rate.  Regular rhythm.    Abdomen: Abdomen is soft.  Bowel sounds are normal.   There is no abdominal tenderness.     Extremities: Normal range of motion.  There is no dependent edema.    Pulses: Distal pulses are intact.    Neurological: Patient is alert and oriented to person, place and time.    Pupils:  Pupils are equal, round, and reactive to light.    Skin:  Warm and dry.                Results Review:     I reviewed the patient's new clinical results.  I reviewed the patient's new imaging results and agree with the interpretation.  I reviewed the patient's other test results and agree with the interpretation    WBC WBC " "  Date Value Ref Range Status   09/06/2024 9.20 3.40 - 10.80 10*3/mm3 Final   09/05/2024 9.55 3.40 - 10.80 10*3/mm3 Final   09/04/2024 7.76 3.40 - 10.80 10*3/mm3 Final      HGB Hemoglobin   Date Value Ref Range Status   09/06/2024 10.8 (L) 13.0 - 17.7 g/dL Final   09/05/2024 10.9 (L) 13.0 - 17.7 g/dL Final   09/04/2024 11.1 (L) 13.0 - 17.7 g/dL Final      HCT Hematocrit   Date Value Ref Range Status   09/06/2024 33.5 (L) 37.5 - 51.0 % Final   09/05/2024 34.1 (L) 37.5 - 51.0 % Final   09/04/2024 35.9 (L) 37.5 - 51.0 % Final      Platlets No results found for: \"LABPLAT\"   MCV MCV   Date Value Ref Range Status   09/06/2024 94.4 79.0 - 97.0 fL Final   09/05/2024 96.3 79.0 - 97.0 fL Final   09/04/2024 99.2 (H) 79.0 - 97.0 fL Final          Sodium Sodium   Date Value Ref Range Status   09/06/2024 136 136 - 145 mmol/L Final   09/05/2024 140 136 - 145 mmol/L Final   09/04/2024 137 136 - 145 mmol/L Final      Potassium Potassium   Date Value Ref Range Status   09/06/2024 4.5 3.5 - 5.2 mmol/L Final   09/05/2024 4.0 3.5 - 5.2 mmol/L Final   09/04/2024 5.4 (H) 3.5 - 5.2 mmol/L Final      Chloride Chloride   Date Value Ref Range Status   09/06/2024 97 (L) 98 - 107 mmol/L Final   09/05/2024 99 98 - 107 mmol/L Final   09/04/2024 97 (L) 98 - 107 mmol/L Final      CO2 CO2   Date Value Ref Range Status   09/06/2024 24.1 22.0 - 29.0 mmol/L Final   09/05/2024 26.4 22.0 - 29.0 mmol/L Final   09/04/2024 20.6 (L) 22.0 - 29.0 mmol/L Final      BUN BUN   Date Value Ref Range Status   09/06/2024 65 (H) 8 - 23 mg/dL Final   09/05/2024 48 (H) 8 - 23 mg/dL Final   09/04/2024 79 (H) 8 - 23 mg/dL Final      Creatinine Creatinine   Date Value Ref Range Status   09/06/2024 7.91 (H) 0.76 - 1.27 mg/dL Final   09/05/2024 6.70 (H) 0.76 - 1.27 mg/dL Final   09/04/2024 10.01 (H) 0.76 - 1.27 mg/dL Final      Calcium Calcium   Date Value Ref Range Status   09/06/2024 8.1 (L) 8.6 - 10.5 mg/dL Final   09/05/2024 8.0 (L) 8.6 - 10.5 mg/dL Final   09/04/2024 " "8.0 (L) 8.6 - 10.5 mg/dL Final      PO4 No results found for: \"CAPO4\"   Albumin Albumin   Date Value Ref Range Status   09/06/2024 2.8 (L) 3.5 - 5.2 g/dL Final   09/05/2024 2.8 (L) 3.5 - 5.2 g/dL Final   09/04/2024 2.8 (L) 3.5 - 5.2 g/dL Final      Magnesium Magnesium   Date Value Ref Range Status   09/06/2024 1.7 1.6 - 2.4 mg/dL Final   09/05/2024 1.7 1.6 - 2.4 mg/dL Final   09/04/2024 2.0 1.6 - 2.4 mg/dL Final      Uric Acid No results found for: \"URICACID\"     Medication Review:   apixaban, 2.5 mg, Oral, BID  arformoterol, 15 mcg, Nebulization, BID - RT  atorvastatin, 10 mg, Oral, Nightly  azithromycin, 250 mg, Oral, Q24H  budesonide, 0.5 mg, Nebulization, BID - RT  carvedilol, 9.375 mg, Oral, BID With Meals  cefTRIAXone, 1,000 mg, Intravenous, Q24H  dimethicone, 1 Application, Topical, 2 times per day  ferrous sulfate, 325 mg, Oral, Daily With Breakfast  guaiFENesin, 600 mg, Oral, Q12H  insulin lispro, 2-7 Units, Subcutaneous, 4x Daily AC & at Bedtime  ipratropium-albuterol, 3 mL, Nebulization, Q6H - RT  levothyroxine, 125 mcg, Oral, Q AM  pantoprazole, 40 mg, Oral, Daily  predniSONE, 40 mg, Oral, Daily With Breakfast  sodium chloride, 10 mL, Intravenous, Q12H          Assessment & Plan       COPD with acute exacerbation    Hyperkalemia    Acute on chronic respiratory failure    ESRD (end stage renal disease)    HTN (hypertension)    DM2 (diabetes mellitus, type 2)    CAD (coronary artery disease)      Assessment & Plan  ESRD-  dialysis again today and continue regular schedule.  Continue m/w/f schedule.  RIJ TDC for Access.  Did cathflo due to poor catheter function.     HTN-  controlled.     DMII-  diabetic diet.     PNA-  + covid.  IV abx started also for mycoplasma also.    Nain Wetzel MD  09/06/24  12:47 EDT          "

## 2024-09-06 NOTE — PLAN OF CARE
Goal Outcome Evaluation:      Patient somewhat agitated. Wouldn't leave nebulizer mask on for treatments. BiPAP remained on standby. No other issues overnight.

## 2024-09-06 NOTE — PROGRESS NOTES
Pulmonary / Critical Care Progress Note      Patient Name: Pedro Tilley  : 1945  MRN: [id number]  Primary Care Physician:  Yessica Sr, WENDY  Date of admission: 2024    Subjective   Subjective   Follow-up for acute on chronic COPD exacerbation, COVID-19 respiratory infection    Over past 24 hours: Remained on azithromycin and ceftriaxone, transition to prednisone, remains on nebulizers.    No acute events overnight.     This morning,   Patient is getting dialysis.  Now on room air.  Dyspnea improving  Overall feeling better  Cough improving  Potassium normalized  No fever or chills      Objective   Objective     Vitals:   Temp:  [97.5 °F (36.4 °C)-98.1 °F (36.7 °C)] 97.5 °F (36.4 °C)  Heart Rate:  [75-82] 75  Resp:  [18-24] 20  BP: (113-135)/(46-66) 113/59  Flow (L/min):  [2] 2    Physical Exam   Vital Signs Reviewed   General: Chronically ill-appearing male, Alert, NAD, lying in bed.    Chest:  good aeration, scattered rhonchi with Velcro-like crackles to auscultation bilaterally, no work of breathing noted on 2 L NC  CV: RRR, no MGR, pulses 2+, equal.  Abd:  Soft, NT, ND, + BS, no HSM  EXT:  no clubbing, no cyanosis, no edema  Neuro:  A&Ox3, CN grossly intact, no focal deficits.  Skin: No rashes or lesions noted      Result Review    Result Review:  I have personally reviewed the results from the time of this admission to 2024 07:06 EDT and agree with these findings:  [x]  Laboratory  [x]  Microbiology  [x]  Radiology  [x]  EKG/Telemetry   [x]  Cardiology/Vascular   []  Pathology  []  Old records  []  Other:  Most notable findings include:   proBNP 30,538      Lab 24  0510 24  0506 24  0519 24  2132   WBC 9.20 9.55 7.76 8.84   HEMOGLOBIN 10.8* 10.9* 11.1* 11.0*   HEMATOCRIT 33.5* 34.1* 35.9* 34.9*   PLATELETS 232 229 234 237   SODIUM 136 140 137 140   POTASSIUM 4.5 4.0 5.4* 5.1   CHLORIDE 97* 99 97* 100   CO2 24.1 26.4 20.6* 23.2   BUN 65* 48* 79* 66*    CREATININE 7.91* 6.70* 10.01* 9.88*   GLUCOSE 100* 101* 238* 114*   CALCIUM 8.1* 8.0* 8.0* 7.8*   PHOSPHORUS 8.7* 6.5* 9.6* 8.7*   TOTAL PROTEIN 6.6 6.5 6.8 6.7   ALBUMIN 2.8* 2.8* 2.8* 2.8*   GLOBULIN  --   --   --  3.9     CT Chest Without Contrast Diagnostic    Result Date: 9/3/2024  CT CHEST WO CONTRAST DIAGNOSTIC Date of Exam: 9/3/2024 9:00 AM EDT Indication: hypercapnea with acute hypoxemia. Comparison: 2/20/2020 Technique: Axial CT images were obtained of the chest without contrast administration.  Reconstructed coronal and sagittal images were also obtained. Automated exposure control and iterative construction methods were used. Findings: Cardiomegaly is present. There is minimal pericardial fluid. There are small pleural effusions. There is interlobular septal thickening in the lower lung fields consistent with pulmonary edema. There is mild patchy airspace consolidation likely atelectasis. Pneumonia is also possible. There are scattered calcified granulomas. There is no mediastinal, axillary or hilar adenopathy. There is a left-sided cardiac pacemaker/AICD. There is a right IJ central venous catheter. Gallstones are present in  the gallbladder. The left kidney is not visualized and may be surgically absent. Surgical clips are present in the left upper quadrant. There are a few hypodense masses in the visualized right kidney measuring up to 2 cm. They may represent cysts. They are indeterminate without contrast. There is bilateral gynecomastia. Degenerative changes are present in the thoracic spine. There is a 4 cm duodenal diverticulum. There is mild stranding around the pancreas.     Impression: Impression: 1. Findings suggest congestive heart failure with small pleural effusions. Mild airspace consolidation in the lower lung fields is likely atelectasis. Superimposed pneumonia is also possible. 2. Cholelithiasis. 3. Mild stranding around the pancreas. Suggest correlation with any history of acute  pancreatitis. 4. Hypodense right renal masses measuring up to 2 cm possibly representing cysts. They're indeterminate without contrast. 5. 4 cm duodenal diverticulum. Electronically Signed: Matthew David MD  9/3/2024 11:06 AM EDT  Workstation ID: [id number]     Assessment & Plan   Assessment / Plan     Active Hospital Problems:  Active Hospital Problems    Diagnosis     **COPD with acute exacerbation     Acute on chronic respiratory failure     ESRD (end stage renal disease)     HTN (hypertension)     DM2 (diabetes mellitus, type 2)     CAD (coronary artery disease)     Hyperkalemia      Impression:   Acute on chronic hypoxic respiratory failure requiring supplemental oxygen  Acute on chronic COPD exacerbation  Mycoplasma pneumonia  COVID-19 respiratory tract infection  ESRD on dialysis  Elevated proBNP, 30,538  HFpEF  A-fib on Eliquis    Plan:   -Can discontinue antibiotics.  -Mycoplasma IgM positive.  S pneumo and Legionella negative.  Sputum culture pending.  -Continue prednisone 40 mg daily.  Can discontinue steroids at the time of discharge  -Continue Brovana, Pulmicort, DuoNebs  -Continue bronchopulmonary hygiene.  Encourage I-S and flutter  -Encourage activity as tolerated  -Continue Eliquis for A-fib  -Trend electrolytes and renal panel.  Replace electrolytes as needed.  -Nephrology on board.  Appreciate assistance. HD timing per nephrology.  Most of his symptoms with hypoxia and desaturation is likely related to volume overload.  -PT/OT on board.  Appreciate assistance  -Pulmonary rehab consulted    We will sign off now.  Call with questions.        VTE Prophylaxis:  Pharmacologic VTE prophylaxis orders are present.    CODE STATUS:   Level Of Support Discussed With: Patient  Code Status (Patient has no pulse and is not breathing): CPR (Attempt to Resuscitate)  Medical Interventions (Patient has pulse or is breathing): Full Support      I personally reviewed pertinent labs, imaging and provider notes.  Discussed with bedside nurse and will discuss with primary service.     Electronically signed by Sagar Nicole MD, 9/6/2024, 07:06 EDT.

## 2024-09-06 NOTE — NURSING NOTE
Duration of Treatment 4.0 Hours   Access Site Tunneled Dialysis Catheter   Dialyzer Revaclear    mL/min   Dialysate Temperature (C) 36   BFR-As tolerated to a maximum of: 400 mL/min   Dialysate Solution Bath: K+ = 2 mEq, Ca = 2.5mEq   Bicarb 40 mEq   Na+ 137 meq   Fluid Removal: 2L     Patient started with leg cramps. UF goal was decreased to 1500, then progressively advanced to 1700.  Total Removed: 1700  BLP: 96  Time:4hrs

## 2024-09-06 NOTE — PROGRESS NOTES
[facility]   Hospitalist Progress Note  Date: 2024  Patient Name: Pedro Tilley  : 1945  MRN: [id number]  Date of admission: 2024      Subjective   Subjective       Chief complaint: Shortness of breath, generalized weakness    Summary:  78-year-old male with end-stage renal disease on hemodialysis, diabetes, hypertension, CAD, COPD, GERD without esophagitis, chronic diastolic heart failure, hospitalized on 2024 as a transfer from an outside facility for weakness and shortness of breath/chief complaint, he was placed on antibiotics after chest x-ray showed pneumonia and he is found to be COVID-19 positive, he was hypoxemic upon transfer to our facility, he tested positive for mycoplasma, started on azithromycin, steroids, pulmonary consulted, nephrology consulted.  Overnight sundowning, confused, pulling at lines and tubes, received sedating medications    Interval follow-up: Seen and examined this morning, no acute distress, doing better from a sundowning standpoint, wife is with him at the bedside, has been trying to get out of bed several times, refused to wear BiPAP.  This morning he was a lot calmer and cooperative.  He does not feel well.  ABG ordered to determine his pCO2 levels.  Creatinine 7.91, BUN 65, potassium 4.5, sodium 136.  Blood sugars in the 100 range, white blood cell count 9000, hemoglobin 10.8.  Telemetry reviewed, no acute major rhythmic events, sinus rhythm in the 70s.    Review of systems:  All systems reviewed and negative except for weakness, fatigue, cough, shortness of breath with exertional activity, sundowning, intermittent confusion    Objective   Objective     Vitals:   Temp:  [97.2 °F (36.2 °C)-98.1 °F (36.7 °C)] 97.2 °F (36.2 °C)  Heart Rate:  [75-82] 75  Resp:  [18-24] 18  BP: (107-135)/(46-64) 119/53  Flow (L/min):  [2] 2  Physical Exam                 Constitutional: Awake, alert, ill-appearing lying in bed              Eyes: PERRLA, sclerae anicteric, no  conjunctival injection              HENT: NCAT, mucous membranes moist              Neck: Supple, full range of motion              Respiratory: No wheezing or rhonchi, no tachypnea, on nasal cannula              Cardiovascular: RRR, no murmurs, rubs, or gallops, palpable pedal pulses bilaterally              Gastrointestinal: Positive bowel sounds, soft, nontender, nondistended              Musculoskeletal: Bilateral lower extremity edema, no clubbing or cyanosis to extremities              Psychiatric: Appropriate affect, cooperative              Neurologic: Oriented x 2, strength symmetric in all extremities at his baseline, Cranial Nerves grossly intact to confrontation, speech mumbles but understandable              Skin: No rashes     Result Review    Result Review:  I have personally reviewed the pertinent results from the past 24 hours to 9/6/2024 10:23 EDT and agree with these findings:  [x]  Laboratory   CBC          9/4/2024    05:19 9/5/2024    05:06 9/6/2024    05:10   CBC   WBC 7.76  9.55  9.20    RBC 3.62  3.54  3.55    Hemoglobin 11.1  10.9  10.8    Hematocrit 35.9  34.1  33.5    MCV 99.2  96.3  94.4    MCH 30.7  30.8  30.4    MCHC 30.9  32.0  32.2    RDW 15.7  15.3  14.8    Platelets 234  229  232      BMP          9/4/2024    05:19 9/5/2024    05:06 9/6/2024    05:10   BMP   BUN 79  48  65    Creatinine 10.01  6.70  7.91    Sodium 137  140  136    Potassium 5.4  4.0  4.5    Chloride 97  99  97    CO2 20.6  26.4  24.1    Calcium 8.0  8.0  8.1      LIVER FUNCTION TESTS:      Lab 09/06/24  0510 09/05/24  0506 09/04/24  0519 09/02/24  2132   TOTAL PROTEIN 6.6 6.5 6.8 6.7   ALBUMIN 2.8* 2.8* 2.8* 2.8*   GLOBULIN  --   --   --  3.9   ALT (SGPT) 6 6 5 5   AST (SGOT) 25 24 22 25   BILIRUBIN 0.5 0.4 0.4 0.5   INDIRECT BILIRUBIN 0.2 0.1 0.2  --    BILIRUBIN DIRECT 0.3 0.3 0.2  --    ALK PHOS 65 77 137* 119*       [x]  Microbiology   Microbiology Results (last 10 days)       Procedure Component Value -  Date/Time    S. Pneumo Ag Urine or CSF - Urine, Urine, Clean Catch [[id number]]  (Normal) Collected: 09/03/24 1207    Lab Status: Final result Specimen: Urine, Clean Catch Updated: 09/03/24 1302     Strep Pneumo Ag Negative    Legionella Antigen, Urine - Urine, Urine, Clean Catch [[id number]]  (Normal) Collected: 09/03/24 1207    Lab Status: Final result Specimen: Urine, Clean Catch Updated: 09/03/24 1302     LEGIONELLA ANTIGEN, URINE Negative    Mycoplasma Pneumoniae Antibody, IgM - Blood, Arm, Left [[id number]]  (Abnormal) Collected: 09/03/24 0806    Lab Status: Final result Specimen: Blood from Arm, Left Updated: 09/03/24 0914     Mycoplasma pneumo IgM Positive              [x]  Radiology CT Chest Without Contrast Diagnostic    Result Date: 9/3/2024  Impression: 1. Findings suggest congestive heart failure with small pleural effusions. Mild airspace consolidation in the lower lung fields is likely atelectasis. Superimposed pneumonia is also possible. 2. Cholelithiasis. 3. Mild stranding around the pancreas. Suggest correlation with any history of acute pancreatitis. 4. Hypodense right renal masses measuring up to 2 cm possibly representing cysts. They're indeterminate without contrast. 5. 4 cm duodenal diverticulum. Electronically Signed: Matthew David MD  9/3/2024 11:06 AM EDT  Workstation ID: [id number]    XR Chest 1 View    Result Date: 9/2/2024  Stable cardiac enlargement with chronic changes in the lung bases. Electronically Signed: Lam Reed MD  9/2/2024 9:34 PM EDT  Workstation ID: [id number]       [x]  EKG/Telemetry   ECG 12 Lead Electrolyte Imbalance   Final Result   HEART RATE=75  bpm   RR Kakpygrg=402  ms   UT Interval=  ms   P Horizontal Axis=60  deg   P Front Axis=  deg   QRSD Vwapojxi=258  ms   QT Edbzpylg=816  ms   HCtG=173  ms   QRS Axis=235  deg   T Wave Axis=71  deg   - ABNORMAL ECG -   Afib/flutter and ventricular-paced rhythm   Biventricular paced rhythm   No further analysis attempted due to  paced rhythm   Electronically Signed By: Jerson Perry ([facility]) 2024-09-04 11:48:59   Date and Time of Study:2024-09-03 20:22:48      ECG 12 Lead Electrolyte Imbalance   Final Result   HEART RATE=75  bpm   RR Erempyuw=465  ms   GA Interval=  ms   P Horizontal Axis=  deg   P Front Axis=  deg   QRSD Ussqyxqt=935  ms   QT Bvdtakgk=566  ms   RDuQ=914  ms   QRS Axis=244  deg   T Wave Axis=57  deg   - ABNORMAL ECG -   Afib/flutter and ventricular-paced rhythm   Biventricular paced rhythm   No further analysis attempted due to paced rhythm   Electronically Signed By: Bill Rowe ([facility]) 2024-09-03 17:57:13   Date and Time of Study:2024-09-02 21:33:59          []  Cardiology/Vascular   []  Pathology  [x]  Old records  []  Other:    Assessment & Plan   Assessment / Plan     Assessment/Plan:  Assessment:  Acute hypoxemic respiratory failure  COPD with acute exacerbation    SARS-CoV-2 infection  COVID-19 causing lower respiratory tract infection  Mycoplasma pneumonia  Acute on chronic respiratory failure    ESRD (end stage renal disease)    Paroxysmal atrial fibrillation  HTN (hypertension)    DM2 (diabetes mellitus, type 2)    CAD (coronary artery disease)    Hyperkalemia      Plan:  Labs and imaging reviewed  Check an ABG, check his pCO2, probably needs to go back on BiPAP for some degree to blow off carbon oxide  Avoid sedating medications in this end-stage renal patient, will result in delayed clearance and subsequent prolonged lethargy  Consulted pulmonary discussed with Dr. Nicole, will follow-up ABG  Continue azithromycin for treatment for mycoplasma pneumonia for 5 days total  Continue ceftriaxone for 5 days total  Continue prednisone 40 mg daily to complete 5 days  Continue Brovana Pulmicort nebs twice daily  Coreg resumed at higher dose at 9.375 mg p.o. twice daily  Continue Lipitor 10 mg nightly  Continue reduced dose gabapentin at 100 mg daily  Continue Protonix 40 mg daily  Wean oxygen per tolerance  Nephrology  consulted for dialysis orders  PT/OT  AM labs  Full code  DVT prophylaxis Eliquis  Clinical course to dictate further management  Discussed with nurse at the bedside    VTE Prophylaxis:  Pharmacologic VTE prophylaxis orders are present.        CODE STATUS:   Level Of Support Discussed With: Patient  Code Status (Patient has no pulse and is not breathing): CPR (Attempt to Resuscitate)  Medical Interventions (Patient has pulse or is breathing): Full Support        Electronically signed by Kana Tavares MD, 9/6/2024, 10:23 EDT.    Portions of this documentation were transcribed electronically from a voice recognition software.  I confirm all data accurately represents the service(s) I performed at today's visit.

## 2024-09-07 ENCOUNTER — APPOINTMENT (OUTPATIENT)
Dept: CT IMAGING | Facility: HOSPITAL | Age: 79
End: 2024-09-07
Payer: MEDICARE

## 2024-09-07 LAB
ALBUMIN SERPL-MCNC: 2.9 G/DL (ref 3.5–5.2)
ALP SERPL-CCNC: 73 U/L (ref 39–117)
ALT SERPL W P-5'-P-CCNC: 8 U/L (ref 1–41)
ANION GAP SERPL CALCULATED.3IONS-SCNC: 11.4 MMOL/L (ref 5–15)
AST SERPL-CCNC: 29 U/L (ref 1–40)
BASOPHILS # BLD AUTO: 0.05 10*3/MM3 (ref 0–0.2)
BASOPHILS NFR BLD AUTO: 0.5 % (ref 0–1.5)
BILIRUB CONJ SERPL-MCNC: 0.3 MG/DL (ref 0–0.3)
BILIRUB INDIRECT SERPL-MCNC: 0.2 MG/DL
BILIRUB SERPL-MCNC: 0.5 MG/DL (ref 0–1.2)
BUN SERPL-MCNC: 36 MG/DL (ref 8–23)
BUN/CREAT SERPL: 6.8 (ref 7–25)
CALCIUM SPEC-SCNC: 8.1 MG/DL (ref 8.6–10.5)
CHLORIDE SERPL-SCNC: 98 MMOL/L (ref 98–107)
CO2 SERPL-SCNC: 29.6 MMOL/L (ref 22–29)
CREAT SERPL-MCNC: 5.31 MG/DL (ref 0.76–1.27)
DEPRECATED RDW RBC AUTO: 50.4 FL (ref 37–54)
EGFRCR SERPLBLD CKD-EPI 2021: 10.5 ML/MIN/1.73
EOSINOPHIL # BLD AUTO: 0.15 10*3/MM3 (ref 0–0.4)
EOSINOPHIL NFR BLD AUTO: 1.5 % (ref 0.3–6.2)
ERYTHROCYTE [DISTWIDTH] IN BLOOD BY AUTOMATED COUNT: 14.6 % (ref 12.3–15.4)
GLUCOSE BLDC GLUCOMTR-MCNC: 119 MG/DL (ref 70–99)
GLUCOSE BLDC GLUCOMTR-MCNC: 86 MG/DL (ref 70–99)
GLUCOSE BLDC GLUCOMTR-MCNC: 89 MG/DL (ref 70–99)
GLUCOSE BLDC GLUCOMTR-MCNC: 90 MG/DL (ref 70–99)
GLUCOSE BLDC GLUCOMTR-MCNC: 95 MG/DL (ref 70–99)
GLUCOSE SERPL-MCNC: 84 MG/DL (ref 65–99)
HCT VFR BLD AUTO: 33.9 % (ref 37.5–51)
HGB BLD-MCNC: 10.9 G/DL (ref 13–17.7)
IMM GRANULOCYTES # BLD AUTO: 0.04 10*3/MM3 (ref 0–0.05)
IMM GRANULOCYTES NFR BLD AUTO: 0.4 % (ref 0–0.5)
LYMPHOCYTES # BLD AUTO: 3.69 10*3/MM3 (ref 0.7–3.1)
LYMPHOCYTES NFR BLD AUTO: 36.6 % (ref 19.6–45.3)
MAGNESIUM SERPL-MCNC: 1.6 MG/DL (ref 1.6–2.4)
MCH RBC QN AUTO: 30.3 PG (ref 26.6–33)
MCHC RBC AUTO-ENTMCNC: 32.2 G/DL (ref 31.5–35.7)
MCV RBC AUTO: 94.2 FL (ref 79–97)
MONOCYTES # BLD AUTO: 1.23 10*3/MM3 (ref 0.1–0.9)
MONOCYTES NFR BLD AUTO: 12.2 % (ref 5–12)
NEUTROPHILS NFR BLD AUTO: 4.93 10*3/MM3 (ref 1.7–7)
NEUTROPHILS NFR BLD AUTO: 48.8 % (ref 42.7–76)
NRBC BLD AUTO-RTO: 0.2 /100 WBC (ref 0–0.2)
PHOSPHATE SERPL-MCNC: 5 MG/DL (ref 2.5–4.5)
PLATELET # BLD AUTO: 254 10*3/MM3 (ref 140–450)
PMV BLD AUTO: 10.9 FL (ref 6–12)
POTASSIUM SERPL-SCNC: 3.7 MMOL/L (ref 3.5–5.2)
PROT SERPL-MCNC: 6.6 G/DL (ref 6–8.5)
RBC # BLD AUTO: 3.6 10*6/MM3 (ref 4.14–5.8)
SODIUM SERPL-SCNC: 139 MMOL/L (ref 136–145)
WBC NRBC COR # BLD AUTO: 10.09 10*3/MM3 (ref 3.4–10.8)

## 2024-09-07 PROCEDURE — 85025 COMPLETE CBC W/AUTO DIFF WBC: CPT | Performed by: FAMILY MEDICINE

## 2024-09-07 PROCEDURE — 83735 ASSAY OF MAGNESIUM: CPT | Performed by: FAMILY MEDICINE

## 2024-09-07 PROCEDURE — 99232 SBSQ HOSP IP/OBS MODERATE 35: CPT | Performed by: FAMILY MEDICINE

## 2024-09-07 PROCEDURE — 80048 BASIC METABOLIC PNL TOTAL CA: CPT | Performed by: FAMILY MEDICINE

## 2024-09-07 PROCEDURE — 25010000002 CEFTRIAXONE PER 250 MG: Performed by: FAMILY MEDICINE

## 2024-09-07 PROCEDURE — 94660 CPAP INITIATION&MGMT: CPT

## 2024-09-07 PROCEDURE — 82948 REAGENT STRIP/BLOOD GLUCOSE: CPT | Performed by: FAMILY MEDICINE

## 2024-09-07 PROCEDURE — 94799 UNLISTED PULMONARY SVC/PX: CPT

## 2024-09-07 PROCEDURE — 70450 CT HEAD/BRAIN W/O DYE: CPT

## 2024-09-07 PROCEDURE — 80076 HEPATIC FUNCTION PANEL: CPT | Performed by: FAMILY MEDICINE

## 2024-09-07 PROCEDURE — 94664 DEMO&/EVAL PT USE INHALER: CPT

## 2024-09-07 PROCEDURE — 82948 REAGENT STRIP/BLOOD GLUCOSE: CPT

## 2024-09-07 PROCEDURE — 94761 N-INVAS EAR/PLS OXIMETRY MLT: CPT

## 2024-09-07 PROCEDURE — 84100 ASSAY OF PHOSPHORUS: CPT | Performed by: FAMILY MEDICINE

## 2024-09-07 RX ADMIN — LEVOTHYROXINE SODIUM 125 MCG: 125 TABLET ORAL at 06:06

## 2024-09-07 RX ADMIN — Medication 1 APPLICATION: at 21:13

## 2024-09-07 RX ADMIN — CARVEDILOL 9.38 MG: 6.25 TABLET, FILM COATED ORAL at 17:58

## 2024-09-07 RX ADMIN — Medication 1 APPLICATION: at 09:55

## 2024-09-07 RX ADMIN — GUAIFENESIN 600 MG: 600 TABLET ORAL at 09:50

## 2024-09-07 RX ADMIN — Medication 10 ML: at 21:13

## 2024-09-07 RX ADMIN — IPRATROPIUM BROMIDE AND ALBUTEROL SULFATE 3 ML: .5; 3 SOLUTION RESPIRATORY (INHALATION) at 19:23

## 2024-09-07 RX ADMIN — GUAIFENESIN 600 MG: 600 TABLET ORAL at 21:09

## 2024-09-07 RX ADMIN — IPRATROPIUM BROMIDE AND ALBUTEROL SULFATE 3 ML: .5; 3 SOLUTION RESPIRATORY (INHALATION) at 23:54

## 2024-09-07 RX ADMIN — APIXABAN 2.5 MG: 2.5 TABLET, FILM COATED ORAL at 09:50

## 2024-09-07 RX ADMIN — FERROUS SULFATE TAB 325 MG (65 MG ELEMENTAL FE) 325 MG: 325 (65 FE) TAB at 09:50

## 2024-09-07 RX ADMIN — BUDESONIDE 0.5 MG: 0.5 SUSPENSION RESPIRATORY (INHALATION) at 19:23

## 2024-09-07 RX ADMIN — IPRATROPIUM BROMIDE AND ALBUTEROL SULFATE 3 ML: .5; 3 SOLUTION RESPIRATORY (INHALATION) at 06:17

## 2024-09-07 RX ADMIN — APIXABAN 2.5 MG: 2.5 TABLET, FILM COATED ORAL at 21:09

## 2024-09-07 RX ADMIN — ATORVASTATIN CALCIUM 10 MG: 10 TABLET, FILM COATED ORAL at 21:09

## 2024-09-07 RX ADMIN — IPRATROPIUM BROMIDE AND ALBUTEROL SULFATE 3 ML: .5; 3 SOLUTION RESPIRATORY (INHALATION) at 11:39

## 2024-09-07 RX ADMIN — CEFTRIAXONE SODIUM 1000 MG: 1 INJECTION, POWDER, FOR SOLUTION INTRAMUSCULAR; INTRAVENOUS at 11:47

## 2024-09-07 RX ADMIN — PANTOPRAZOLE SODIUM 40 MG: 40 TABLET, DELAYED RELEASE ORAL at 09:50

## 2024-09-07 RX ADMIN — Medication 10 ML: at 09:50

## 2024-09-07 RX ADMIN — BUDESONIDE 0.5 MG: 0.5 SUSPENSION RESPIRATORY (INHALATION) at 06:17

## 2024-09-07 RX ADMIN — IPRATROPIUM BROMIDE AND ALBUTEROL SULFATE 3 ML: .5; 3 SOLUTION RESPIRATORY (INHALATION) at 00:05

## 2024-09-07 RX ADMIN — ARFORMOTEROL TARTRATE 15 MCG: 15 SOLUTION RESPIRATORY (INHALATION) at 19:23

## 2024-09-07 RX ADMIN — ARFORMOTEROL TARTRATE 15 MCG: 15 SOLUTION RESPIRATORY (INHALATION) at 06:17

## 2024-09-07 NOTE — PROGRESS NOTES
[facility]   Hospitalist Progress Note  Date: 2024  Patient Name: Pedro Tilley  : 1945  MRN: [id number]  Date of admission: 2024      Subjective   Subjective     Chief complaint: Shortness of breath, generalized weakness    Summary:  78-year-old male with end-stage renal disease on hemodialysis, diabetes, hypertension, CAD, COPD, GERD without esophagitis, chronic diastolic heart failure, hospitalized on 2024 as a transfer from an outside facility for weakness and shortness of breath/chief complaint, he was placed on antibiotics after chest x-ray showed pneumonia and he is found to be COVID-19 positive, he was hypoxemic upon transfer to our facility, he tested positive for mycoplasma, started on azithromycin, steroids, pulmonary consulted, nephrology consulted.  Overnight , confused, pulling at lines and tubes, received sedating medications, AMS thereafter    Interval follow-up: Seen and examined this morning, no acute distress, more alert and awake today than yesterday, able to state his name.  He was confused after dialysis, CT head was unremarkable.  Blood pressures have been stable.  He has not been wearing BiPAP, as much as previous, encouraged further use of the BiPAP.  Creatinine 5.31, BUN 36, bicarb 29, potassium 3.7, sodium 139, white blood count 10,000, hemoglobin 10.9.    Review of systems:  All systems reviewed and negative except for weakness, fatigue, cough, intermittent confusion    Objective   Objective     Vitals:   Temp:  [97.7 °F (36.5 °C)-98.2 °F (36.8 °C)] 98.1 °F (36.7 °C)  Heart Rate:  [75-78] 75  Resp:  [16-24] 16  BP: (110-139)/(51-75) 116/54  Flow (L/min):  [2] 2  Physical Exam               Constitutional: Awake, alert, ill-appearing lying in bed              Eyes: PERRLA, sclerae anicteric, no conjunctival injection              HENT: NCAT, mucous membranes moist              Neck: Supple, full range of motion              Respiratory: No wheezing or  rhonchi, no tachypnea, on nasal cannula              Cardiovascular: RRR, no murmurs, rubs, or gallops, palpable pedal pulses bilaterally              Gastrointestinal: Positive bowel sounds, soft, nontender, nondistended              Musculoskeletal: Bilateral lower extremity edema, no clubbing or cyanosis to extremities              Psychiatric: Appropriate affect, cooperative              Neurologic: Oriented x 2 able to state his name and date of birth, strength symmetric in all extremities at his baseline, Cranial Nerves grossly intact to confrontation, speech mumbles but understandable              Skin: No rashes     Result Review    Result Review:  I have personally reviewed the pertinent results from the past 24 hours to 9/7/2024 13:30 EDT and agree with these findings:  [x]  Laboratory   CBC          9/5/2024    05:06 9/6/2024    05:10 9/7/2024    05:10   CBC   WBC 9.55  9.20  10.09    RBC 3.54  3.55  3.60    Hemoglobin 10.9  10.8  10.9    Hematocrit 34.1  33.5  33.9    MCV 96.3  94.4  94.2    MCH 30.8  30.4  30.3    MCHC 32.0  32.2  32.2    RDW 15.3  14.8  14.6    Platelets 229  232  254      BMP          9/5/2024    05:06 9/6/2024    05:10 9/7/2024    05:10   BMP   BUN 48  65  36    Creatinine 6.70  7.91  5.31    Sodium 140  136  139    Potassium 4.0  4.5  3.7    Chloride 99  97  98    CO2 26.4  24.1  29.6    Calcium 8.0  8.1  8.1      LIVER FUNCTION TESTS:      [x]  Microbiology   Microbiology Results (last 10 days)       Procedure Component Value - Date/Time    S. Pneumo Ag Urine or CSF - Urine, Urine, Clean Catch [[id number]]  (Normal) Collected: 09/03/24 1207    Lab Status: Final result Specimen: Urine, Clean Catch Updated: 09/03/24 1302     Strep Pneumo Ag Negative    Legionella Antigen, Urine - Urine, Urine, Clean Catch [[id number]]  (Normal) Collected: 09/03/24 1207    Lab Status: Final result Specimen: Urine, Clean Catch Updated: 09/03/24 1302     LEGIONELLA ANTIGEN, URINE Negative    Mycoplasma  Pneumoniae Antibody, IgM - Blood, Arm, Left [[id number]]  (Abnormal) Collected: 09/03/24 0806    Lab Status: Final result Specimen: Blood from Arm, Left Updated: 09/03/24 0914     Mycoplasma pneumo IgM Positive              [x]  Radiology CT Head Without Contrast    Result Date: 9/7/2024  Impression: No acute intracranial abnormalities are identified. Electronically Signed: Matthew David MD  9/7/2024 9:50 AM EDT  Workstation ID: [id number]    CT Chest Without Contrast Diagnostic    Result Date: 9/3/2024  Impression: 1. Findings suggest congestive heart failure with small pleural effusions. Mild airspace consolidation in the lower lung fields is likely atelectasis. Superimposed pneumonia is also possible. 2. Cholelithiasis. 3. Mild stranding around the pancreas. Suggest correlation with any history of acute pancreatitis. 4. Hypodense right renal masses measuring up to 2 cm possibly representing cysts. They're indeterminate without contrast. 5. 4 cm duodenal diverticulum. Electronically Signed: Matthew David MD  9/3/2024 11:06 AM EDT  Workstation ID: [id number]    XR Chest 1 View    Result Date: 9/2/2024  Stable cardiac enlargement with chronic changes in the lung bases. Electronically Signed: Lam Reed MD  9/2/2024 9:34 PM EDT  Workstation ID: [id number]       [x]  EKG/Telemetry   ECG 12 Lead Electrolyte Imbalance   Final Result   HEART RATE=75  bpm   RR Lwlvgtid=947  ms   AZ Interval=  ms   P Horizontal Axis=60  deg   P Front Axis=  deg   QRSD Xujimpha=283  ms   QT Sbnuqrrt=663  ms   HXkI=621  ms   QRS Axis=235  deg   T Wave Axis=71  deg   - ABNORMAL ECG -   Afib/flutter and ventricular-paced rhythm   Biventricular paced rhythm   No further analysis attempted due to paced rhythm   Electronically Signed By: Jerson Perry ([facility]) 2024-09-04 11:48:59   Date and Time of Study:2024-09-03 20:22:48      ECG 12 Lead Electrolyte Imbalance   Final Result   HEART RATE=75  bpm   RR Hdmtesww=357  ms   AZ Interval=  ms   P  Horizontal Axis=  deg   P Front Axis=  deg   QRSD Cjfgxite=066  ms   QT Nfyqybxv=653  ms   THpQ=576  ms   QRS Axis=244  deg   T Wave Axis=57  deg   - ABNORMAL ECG -   Afib/flutter and ventricular-paced rhythm   Biventricular paced rhythm   No further analysis attempted due to paced rhythm   Electronically Signed By: Bill Rowe ([facility]) 2024-09-03 17:57:13   Date and Time of Study:2024-09-02 21:33:59          []  Cardiology/Vascular   []  Pathology  [x]  Old records  []  Other:    Assessment & Plan   Assessment / Plan     Assessment/Plan:  Assessment:  Acute hypoxemic respiratory failure  COPD with acute exacerbation    SARS-CoV-2 infection  COVID-19 causing lower respiratory tract infection  Mycoplasma pneumonia  Acute on chronic respiratory failure    ESRD (end stage renal disease)    Paroxysmal atrial fibrillation  HTN (hypertension)    DM2 (diabetes mellitus, type 2)    CAD (coronary artery disease)    Hyperkalemia      Plan:  Labs and imaging reviewed  Check an ABG, check his pCO2, probably needs to go back on BiPAP for some degree to blow off carbon oxide  Avoid sedating medications in this end-stage renal patient, will result in delayed clearance and subsequent prolonged lethargy  Consulted pulmonary discussed with Dr. Nicole, will follow-up ABG  Continue azithromycin for treatment for mycoplasma pneumonia for 5 days total  Continue ceftriaxone for 5 days total  Continue prednisone 40 mg daily to complete 5 days  Continue Brovana Pulmicort nebs twice daily  Coreg resumed at higher dose at 9.375 mg p.o. twice daily  Continue Lipitor 10 mg nightly  Continue reduced dose gabapentin at 100 mg daily  Continue Protonix 40 mg daily  Wean oxygen per tolerance  Nephrology consulted for dialysis orders  PT/OT  AM labs  Full code  DVT prophylaxis Eliquis  Clinical course to dictate further management  Discussed with nurse at the bedside    VTE Prophylaxis:  Pharmacologic VTE prophylaxis orders are present.        CODE  STATUS:   Level Of Support Discussed With: Patient  Code Status (Patient has no pulse and is not breathing): CPR (Attempt to Resuscitate)  Medical Interventions (Patient has pulse or is breathing): Full Support        Electronically signed by Kana Tavares MD, 9/7/2024, 13:30 EDT.    Portions of this documentation were transcribed electronically from a voice recognition software.  I confirm all data accurately represents the service(s) I performed at today's visit.

## 2024-09-07 NOTE — PLAN OF CARE
Goal Outcome Evaluation:  Plan of Care Reviewed With: patient, spouse        Progress: declining  Outcome Evaluation: vss. but patient remains confused. reported that patient was alert to self, but upon assessment he was unable to tell me his name. family states he is usually confused after dialysis and it subsides, but a sitter was needed during midshift because patient continued to take off telemetry, not wear bipap, etc. Wife was unable to redirect patient. patient has been wearing bipap since sitter arrived at 0230. monitoring patient closely.         Patient was assisted to BSC by two staff members, me as one of them, and the patient was so weak and unable to help us get back into bed that he almost fell. Spoke with wife about the need for patient to stay in bed until he was stronger as he could have been hurt as well as us. She understood. Unsure if this is how the patient typically is mobility wise, but did want to make note of that.

## 2024-09-07 NOTE — PLAN OF CARE
Goal Outcome Evaluation:  Plan of Care Reviewed With: patient           Outcome Evaluation: Patient wore Bipap 12/5 rate 12 last night. Currently off on room air resting comfortably.

## 2024-09-07 NOTE — PLAN OF CARE
Goal Outcome Evaluation:  Plan of Care Reviewed With: patient, family        Progress: no change  Outcome Evaluation: Pt remains A&Ox1. VSS. Pt maintaining SpO2 >90 on 2L NC. Sitter at bedside. Pt has been mostly calm and restful with episodes of agitation. Nephrology and Hospitalist expressed concern for sedatives worsening confusion in the setting of decreased renal function. Plan discussed with family regarding possible need to restrain patient if necessary. Family has been able to assist with redirecting pt while at the bedside. Pt will dialyze again on Monday. Continuing plan of care at this time.

## 2024-09-07 NOTE — PROGRESS NOTES
RT EQUIPMENT DEVICE RELATED - SKIN ASSESSMENT    RT Medical Equipment/Device:     NIV Mask:  Under-the-nose   size:  B    Skin Assessment:      Chin:  Intact  Nares:  Intact  Nose:  Intact    Device Skin Pressure Protection:  Skin-to-device areas padded:  None Required    Nurse Notification:  Ernestina Clements, RRT

## 2024-09-07 NOTE — PLAN OF CARE
Goal Outcome Evaluation:      Patient placed on BiPAP of 12/5 12 24%. Patient very agitated and screaming. Sitter in room to assure patient stays on BiPAP.

## 2024-09-07 NOTE — PROGRESS NOTES
RT EQUIPMENT DEVICE RELATED - SKIN ASSESSMENT    RT Medical Equipment/Device:     NIV Mask:  Under-the-nose   size:  B    Skin Assessment:      Cheek:  Intact  Chin:  Intact  Nose:  Intact    Device Skin Pressure Protection:  Pressure points protected    Nurse Notification:  Ernestina Quinn, RRT

## 2024-09-07 NOTE — PROGRESS NOTES
[facility] Progress Note      Patient Name: Pedro Tilley  : 1945  MRN: [id number]  Primary Care Physician:  Yessica Sr, WEDNY  Date of admission: 2024    Subjective     Interval History:   F/u ESRD     Review of Systems:   Dialyzed yesterday.  UF goal reduced to 1.7L due to cramps.   CT head done today for confusion  Been confused in general post dialysis just recently  Dialysis is not of recent onset (started ~ 6 mos ago)    Objective     Vitals:   Temp:  [97.7 °F (36.5 °C)-98.2 °F (36.8 °C)] 98.1 °F (36.7 °C)  Heart Rate:  [75-78] 75  Resp:  [16-24] 16  BP: (110-139)/(51-75) 116/54  Flow (L/min):  [2] 2    Intake/Output Summary (Last 24 hours) at 2024 1404  Last data filed at 2024 0936  Gross per 24 hour   Intake 240 ml   Output --   Net 240 ml       Physical Exam:    General Appearance: frail confused WM in no acute distres  Neck: supple, no JVD  Lungs: Coarse BS bilat  Heart: RRR, normal S1 and S2  Abdomen: soft, nontender, nondistended  : no palpable bladder  Extremities: no edema, cyanosis or clubbing       Scheduled Meds:     apixaban, 2.5 mg, Oral, BID  arformoterol, 15 mcg, Nebulization, BID - RT  atorvastatin, 10 mg, Oral, Nightly  budesonide, 0.5 mg, Nebulization, BID - RT  carvedilol, 9.375 mg, Oral, BID With Meals  dimethicone, 1 Application, Topical, 2 times per day  ferrous sulfate, 325 mg, Oral, Daily With Breakfast  guaiFENesin, 600 mg, Oral, Q12H  insulin lispro, 2-7 Units, Subcutaneous, 4x Daily AC & at Bedtime  ipratropium-albuterol, 3 mL, Nebulization, Q6H - RT  levothyroxine, 125 mcg, Oral, Q AM  pantoprazole, 40 mg, Oral, Daily  sodium chloride, 10 mL, Intravenous, Q12H      IV Meds:        Results Reviewed:   I have personally reviewed the results from the time of this admission to 2024 14:04 EDT     Results from last 7 days   Lab Units 24  0510 24  0510 24  0506   SODIUM mmol/L 139 136 140   POTASSIUM  mmol/L 3.7 4.5 4.0   CHLORIDE mmol/L 98 97* 99   CO2 mmol/L 29.6* 24.1 26.4   BUN mg/dL 36* 65* 48*   CREATININE mg/dL 5.31* 7.91* 6.70*   CALCIUM mg/dL 8.1* 8.1* 8.0*   BILIRUBIN mg/dL 0.5 0.5 0.4   ALK PHOS U/L 73 65 77   ALT (SGPT) U/L 8 6 6   AST (SGOT) U/L 29 25 24   GLUCOSE mg/dL 84 100* 101*     Estimated Creatinine Clearance: 15.4 mL/min (A) (by C-G formula based on SCr of 5.31 mg/dL (H)).  Results from last 7 days   Lab Units 09/07/24  0510 09/06/24  0510 09/05/24  0506   MAGNESIUM mg/dL 1.6 1.7 1.7   PHOSPHORUS mg/dL 5.0* 8.7* 6.5*         Results from last 7 days   Lab Units 09/07/24  0510 09/06/24  0510 09/05/24  0506 09/04/24  0519 09/02/24  2132   WBC 10*3/mm3 10.09 9.20 9.55 7.76 8.84   HEMOGLOBIN g/dL 10.9* 10.8* 10.9* 11.1* 11.0*   PLATELETS 10*3/mm3 254 232 229 234 237           Assessment / Plan     ASSESSMENT:  ESRD- HD MWF via RIJ TDC, s/p cath-stone for poor fcn.  Good clearance yesterday.  Mild vol excess present, UF goal reduced to 1.7L yesterday due to cramps     HTN- controlled on coreg   DMII- mgmt per primary team  Anemia of CKD, hgb stable 10.9.  DC oral iron as poor efficacy with ESRD   COVID-19 PNA-  + covid. S/P abx started also for mycoplasma   Acute encephalopathy - hard to attribute this to dialysis.  Suspect related in part to respiratory acidosis, VBG noted yesterday (pCO2 55).  CT head unremarkable.      PLAN:  Next dialysis MON  If remains confused we may need to repeat ABG  DC oral iron  D/W RN and patient's wife.        Rey Shah MD  09/07/24  14:04 EDT    [facility]  [phone]

## 2024-09-08 LAB
ALBUMIN SERPL-MCNC: 2.7 G/DL (ref 3.5–5.2)
ALP SERPL-CCNC: 70 U/L (ref 39–117)
ALT SERPL W P-5'-P-CCNC: 8 U/L (ref 1–41)
ANION GAP SERPL CALCULATED.3IONS-SCNC: 16.3 MMOL/L (ref 5–15)
AST SERPL-CCNC: 28 U/L (ref 1–40)
BASOPHILS # BLD AUTO: 0.07 10*3/MM3 (ref 0–0.2)
BASOPHILS NFR BLD AUTO: 0.7 % (ref 0–1.5)
BILIRUB CONJ SERPL-MCNC: 0.3 MG/DL (ref 0–0.3)
BILIRUB INDIRECT SERPL-MCNC: 0.2 MG/DL
BILIRUB SERPL-MCNC: 0.5 MG/DL (ref 0–1.2)
BUN SERPL-MCNC: 43 MG/DL (ref 8–23)
BUN/CREAT SERPL: 6.9 (ref 7–25)
CALCIUM SPEC-SCNC: 7.6 MG/DL (ref 8.6–10.5)
CHLORIDE SERPL-SCNC: 97 MMOL/L (ref 98–107)
CO2 SERPL-SCNC: 24.7 MMOL/L (ref 22–29)
CREAT SERPL-MCNC: 6.27 MG/DL (ref 0.76–1.27)
DEPRECATED RDW RBC AUTO: 51.1 FL (ref 37–54)
EGFRCR SERPLBLD CKD-EPI 2021: 8.6 ML/MIN/1.73
EOSINOPHIL # BLD AUTO: 0.25 10*3/MM3 (ref 0–0.4)
EOSINOPHIL NFR BLD AUTO: 2.4 % (ref 0.3–6.2)
ERYTHROCYTE [DISTWIDTH] IN BLOOD BY AUTOMATED COUNT: 14.9 % (ref 12.3–15.4)
GLUCOSE BLDC GLUCOMTR-MCNC: 115 MG/DL (ref 70–99)
GLUCOSE BLDC GLUCOMTR-MCNC: 151 MG/DL (ref 70–99)
GLUCOSE BLDC GLUCOMTR-MCNC: 169 MG/DL (ref 70–99)
GLUCOSE BLDC GLUCOMTR-MCNC: 92 MG/DL (ref 70–99)
GLUCOSE SERPL-MCNC: 81 MG/DL (ref 65–99)
HCT VFR BLD AUTO: 34.7 % (ref 37.5–51)
HGB BLD-MCNC: 11.2 G/DL (ref 13–17.7)
IMM GRANULOCYTES # BLD AUTO: 0.05 10*3/MM3 (ref 0–0.05)
IMM GRANULOCYTES NFR BLD AUTO: 0.5 % (ref 0–0.5)
LYMPHOCYTES # BLD AUTO: 3.77 10*3/MM3 (ref 0.7–3.1)
LYMPHOCYTES NFR BLD AUTO: 35.6 % (ref 19.6–45.3)
MAGNESIUM SERPL-MCNC: 1.6 MG/DL (ref 1.6–2.4)
MCH RBC QN AUTO: 30.4 PG (ref 26.6–33)
MCHC RBC AUTO-ENTMCNC: 32.3 G/DL (ref 31.5–35.7)
MCV RBC AUTO: 94 FL (ref 79–97)
MONOCYTES # BLD AUTO: 1.06 10*3/MM3 (ref 0.1–0.9)
MONOCYTES NFR BLD AUTO: 10 % (ref 5–12)
NEUTROPHILS NFR BLD AUTO: 5.39 10*3/MM3 (ref 1.7–7)
NEUTROPHILS NFR BLD AUTO: 50.8 % (ref 42.7–76)
NRBC BLD AUTO-RTO: 0 /100 WBC (ref 0–0.2)
PHOSPHATE SERPL-MCNC: 6.1 MG/DL (ref 2.5–4.5)
PLATELET # BLD AUTO: 262 10*3/MM3 (ref 140–450)
PMV BLD AUTO: 10.9 FL (ref 6–12)
POTASSIUM SERPL-SCNC: 3.7 MMOL/L (ref 3.5–5.2)
PROT SERPL-MCNC: 6.3 G/DL (ref 6–8.5)
RBC # BLD AUTO: 3.69 10*6/MM3 (ref 4.14–5.8)
SODIUM SERPL-SCNC: 138 MMOL/L (ref 136–145)
WBC NRBC COR # BLD AUTO: 10.59 10*3/MM3 (ref 3.4–10.8)

## 2024-09-08 PROCEDURE — 94761 N-INVAS EAR/PLS OXIMETRY MLT: CPT

## 2024-09-08 PROCEDURE — 94664 DEMO&/EVAL PT USE INHALER: CPT

## 2024-09-08 PROCEDURE — 83735 ASSAY OF MAGNESIUM: CPT | Performed by: FAMILY MEDICINE

## 2024-09-08 PROCEDURE — 63710000001 INSULIN LISPRO (HUMAN) PER 5 UNITS: Performed by: FAMILY MEDICINE

## 2024-09-08 PROCEDURE — 97165 OT EVAL LOW COMPLEX 30 MIN: CPT

## 2024-09-08 PROCEDURE — 99232 SBSQ HOSP IP/OBS MODERATE 35: CPT | Performed by: FAMILY MEDICINE

## 2024-09-08 PROCEDURE — 94799 UNLISTED PULMONARY SVC/PX: CPT

## 2024-09-08 PROCEDURE — 80048 BASIC METABOLIC PNL TOTAL CA: CPT | Performed by: FAMILY MEDICINE

## 2024-09-08 PROCEDURE — 84100 ASSAY OF PHOSPHORUS: CPT | Performed by: FAMILY MEDICINE

## 2024-09-08 PROCEDURE — 94660 CPAP INITIATION&MGMT: CPT

## 2024-09-08 PROCEDURE — 80076 HEPATIC FUNCTION PANEL: CPT | Performed by: FAMILY MEDICINE

## 2024-09-08 PROCEDURE — 82948 REAGENT STRIP/BLOOD GLUCOSE: CPT

## 2024-09-08 PROCEDURE — 85025 COMPLETE CBC W/AUTO DIFF WBC: CPT | Performed by: FAMILY MEDICINE

## 2024-09-08 RX ADMIN — IPRATROPIUM BROMIDE AND ALBUTEROL SULFATE 3 ML: .5; 3 SOLUTION RESPIRATORY (INHALATION) at 18:36

## 2024-09-08 RX ADMIN — APIXABAN 2.5 MG: 2.5 TABLET, FILM COATED ORAL at 09:20

## 2024-09-08 RX ADMIN — BUDESONIDE 0.5 MG: 0.5 SUSPENSION RESPIRATORY (INHALATION) at 06:43

## 2024-09-08 RX ADMIN — ARFORMOTEROL TARTRATE 15 MCG: 15 SOLUTION RESPIRATORY (INHALATION) at 06:43

## 2024-09-08 RX ADMIN — GUAIFENESIN 600 MG: 600 TABLET ORAL at 09:20

## 2024-09-08 RX ADMIN — GUAIFENESIN 600 MG: 600 TABLET ORAL at 20:40

## 2024-09-08 RX ADMIN — ARFORMOTEROL TARTRATE 15 MCG: 15 SOLUTION RESPIRATORY (INHALATION) at 18:36

## 2024-09-08 RX ADMIN — Medication 10 ML: at 09:19

## 2024-09-08 RX ADMIN — Medication 10 ML: at 20:40

## 2024-09-08 RX ADMIN — Medication 1 APPLICATION: at 09:20

## 2024-09-08 RX ADMIN — ATORVASTATIN CALCIUM 10 MG: 10 TABLET, FILM COATED ORAL at 20:40

## 2024-09-08 RX ADMIN — Medication 1 APPLICATION: at 21:15

## 2024-09-08 RX ADMIN — BUDESONIDE 0.5 MG: 0.5 SUSPENSION RESPIRATORY (INHALATION) at 18:36

## 2024-09-08 RX ADMIN — IPRATROPIUM BROMIDE AND ALBUTEROL SULFATE 3 ML: .5; 3 SOLUTION RESPIRATORY (INHALATION) at 06:43

## 2024-09-08 RX ADMIN — INSULIN LISPRO 2 UNITS: 100 INJECTION, SOLUTION INTRAVENOUS; SUBCUTANEOUS at 20:39

## 2024-09-08 RX ADMIN — PANTOPRAZOLE SODIUM 40 MG: 40 TABLET, DELAYED RELEASE ORAL at 09:20

## 2024-09-08 RX ADMIN — INSULIN LISPRO 2 UNITS: 100 INJECTION, SOLUTION INTRAVENOUS; SUBCUTANEOUS at 16:51

## 2024-09-08 RX ADMIN — IPRATROPIUM BROMIDE AND ALBUTEROL SULFATE 3 ML: .5; 3 SOLUTION RESPIRATORY (INHALATION) at 14:13

## 2024-09-08 RX ADMIN — APIXABAN 2.5 MG: 2.5 TABLET, FILM COATED ORAL at 20:40

## 2024-09-08 RX ADMIN — LEVOTHYROXINE SODIUM 125 MCG: 125 TABLET ORAL at 05:56

## 2024-09-08 RX ADMIN — CARVEDILOL 9.38 MG: 6.25 TABLET, FILM COATED ORAL at 11:09

## 2024-09-08 NOTE — PROGRESS NOTES
RT EQUIPMENT DEVICE RELATED - SKIN ASSESSMENT    RT Medical Equipment/Device:     NIV Mask:  Under-the-nose   size:  b    Skin Assessment:      Chin:  Intact  Forehead:  Intact  Nose:  Scab  Lips:  Intact    Device Skin Pressure Protection:  Pressure points protected    Nurse Notification:  Ernestina Darby, RRT

## 2024-09-08 NOTE — PROGRESS NOTES
RT EQUIPMENT DEVICE RELATED - SKIN ASSESSMENT    RT Medical Equipment/Device:     NIV Mask:  Under-the-nose   size:  B    Skin Assessment:      Chin:  Intact  Nares:  Intact    Device Skin Pressure Protection:  Skin-to-device areas padded:  None Required    Nurse Notification:  Ernestina Clements, RRT

## 2024-09-08 NOTE — PROGRESS NOTES
[facility] Progress Note      Patient Name: Pedro Tilley  : 1945  MRN: [id number]  Primary Care Physician:  Yessica Sr APRN  Date of admission: 2024    Subjective     Interval History:   F/u ESRD    Review of Systems:   Less confused today but wife still insists this tends to worsen post dialysis  He denies dyspnea    Objective     Vitals:   Temp:  [97.6 °F (36.4 °C)-98.2 °F (36.8 °C)] 98.1 °F (36.7 °C)  Heart Rate:  [75-97] 75  Resp:  [18-22] 20  BP: (112-138)/(38-71) 134/71  Flow (L/min):  [2] 2    Intake/Output Summary (Last 24 hours) at 2024 1530  Last data filed at 2024 0900  Gross per 24 hour   Intake 240 ml   Output --   Net 240 ml       Physical Exam:    General Appearance: frail confused WM in no acute distres  Neck: supple, no JVD  Lungs: Coarse BS bilat  Heart: RRR, normal S1 and S2  Abdomen: soft, nontender, nondistended  : no palpable bladder  Extremities: no edema, cyanosis or clubbing    Scheduled Meds:     apixaban, 2.5 mg, Oral, BID  arformoterol, 15 mcg, Nebulization, BID - RT  atorvastatin, 10 mg, Oral, Nightly  budesonide, 0.5 mg, Nebulization, BID - RT  carvedilol, 9.375 mg, Oral, BID With Meals  dimethicone, 1 Application, Topical, 2 times per day  guaiFENesin, 600 mg, Oral, Q12H  insulin lispro, 2-7 Units, Subcutaneous, 4x Daily AC & at Bedtime  ipratropium-albuterol, 3 mL, Nebulization, Q6H - RT  levothyroxine, 125 mcg, Oral, Q AM  pantoprazole, 40 mg, Oral, Daily  sodium chloride, 10 mL, Intravenous, Q12H      IV Meds:        Results Reviewed:   I have personally reviewed the results from the time of this admission to 2024 15:30 EDT     Results from last 7 days   Lab Units 24  0514 24  0510 24  0510   SODIUM mmol/L 138 139 136   POTASSIUM mmol/L 3.7 3.7 4.5   CHLORIDE mmol/L 97* 98 97*   CO2 mmol/L 24.7 29.6* 24.1   BUN mg/dL 43* 36* 65*   CREATININE mg/dL 6.27* 5.31* 7.91*   CALCIUM mg/dL 7.6* 8.1* 8.1*    BILIRUBIN mg/dL 0.5 0.5 0.5   ALK PHOS U/L 70 73 65   ALT (SGPT) U/L 8 8 6   AST (SGOT) U/L 28 29 25   GLUCOSE mg/dL 81 84 100*     Estimated Creatinine Clearance: 13 mL/min (A) (by C-G formula based on SCr of 6.27 mg/dL (H)).  Results from last 7 days   Lab Units 09/08/24  0514 09/07/24  0510 09/06/24  0510   MAGNESIUM mg/dL 1.6 1.6 1.7   PHOSPHORUS mg/dL 6.1* 5.0* 8.7*         Results from last 7 days   Lab Units 09/08/24  0514 09/07/24  0510 09/06/24  0510 09/05/24  0506 09/04/24  0519   WBC 10*3/mm3 10.59 10.09 9.20 9.55 7.76   HEMOGLOBIN g/dL 11.2* 10.9* 10.8* 10.9* 11.1*   PLATELETS 10*3/mm3 262 254 232 229 234           Assessment / Plan     ASSESSMENT:  ESRD- HD MWF via RIJ TDC, s/p cath-stone for poor fcn.  Good clearance FRI.  Mild vol excess present, UF goal reduced to 1.7L then due to cramps.  Ca is low 7.6 but corrects ~ 8.6 for low alb      HTN- controlled on coreg   DMII- mgmt per primary team  Anemia of CKD, hgb stable 11.2.  DCd oral iron as poor efficacy with ESRD   COVID-19 PNA. S/P abx also for mycoplasma   Acute encephalopathy - hard to attribute this to dialysis, but wife insists tends to worsen post dialysis.  Suspect related in part to respiratory acidosis, VBG noted on 9/6 (pCO2 55).  CT head unremarkable.  Mentation little better today.  Sitter present.    PLAN:  HD tomorrow, will try lesser BFR to see if this has any impact on mentation post dialysis  Remove 3L as tolerated  D/W wife       Rey Shah MD  09/08/24  15:30 EDT    [facility]  [phone]

## 2024-09-08 NOTE — PROGRESS NOTES
RT EQUIPMENT DEVICE RELATED - SKIN ASSESSMENT    RT Medical Equipment/Device:     NIV Mask:  Under-the-nose   size:  B    Skin Assessment:      Cheek:  Intact  Chin:  Intact  Nares:  Intact  Nose:  Intact    Device Skin Pressure Protection:  Pressure points protected    Nurse Notification:  Ernestina Lu, RRT

## 2024-09-08 NOTE — PLAN OF CARE
Goal Outcome Evaluation:      Patient placed on BiPAP of 12/5 12 24% at 2354. Tolerated well. Has sitter in room to help redirect.

## 2024-09-08 NOTE — PLAN OF CARE
Goal Outcome Evaluation:              Outcome Evaluation: Pt. alert to self only. VSS with no acute changes. Pt. currently has sitter at bedside. Pt. calm and cooperative throughout shift other than a couple attempts to pull oxygen off but was easily redirected. Pt. has no complaints of pain. Continue care per POC

## 2024-09-08 NOTE — PLAN OF CARE
Goal Outcome Evaluation:  Plan of Care Reviewed With: patient        Progress: improving  Outcome Evaluation: Patient is alert to self. Patient is currently on room air, no signs of respiratory distress noted. Blood glucose monitored during shift, treated per MAR. Wound care/skin care completed per orders. No complaints of pain or discomfort at this time. No acute changes throughout shift. VSS. Continue with current plan of care. Plan for dialysis tomorrow.

## 2024-09-08 NOTE — PROGRESS NOTES
[facility]   Hospitalist Progress Note  Date: 2024  Patient Name: Pedro Tilley  : 1945  MRN: [id number]  Date of admission: 2024      Subjective   Subjective   Chief complaint: Shortness of breath, generalized weakness    Summary:  78-year-old male with end-stage renal disease on hemodialysis, diabetes, hypertension, CAD, COPD, GERD without esophagitis, chronic diastolic heart failure, hospitalized on 2024 as a transfer from an outside facility for weakness and shortness of breath/chief complaint, he was placed on antibiotics after chest x-ray showed pneumonia and he is found to be COVID-19 positive, he was hypoxemic upon transfer to our facility, he tested positive for mycoplasma, started on azithromycin, steroids, pulmonary consulted, nephrology consulted.  Overnight , confused, pulling at lines and tubes, received sedating medications, AMS thereafter, mentation slow to improve    Interval follow-up: Seen and examined this morning, no acute distress, patient better, has safety sitter at the bedside, able to answer name and surroundings, feeding himself breakfast, less agitated overnight, creatinine 6.27, BUN 43, bicarb 24, potassium 3.7, blood sugar still has periods of confusion.  s in the 100s, white blood cell count 10,000, hemoglobin 11.2.  No nausea or vomiting.  Volume status unchanged.  Satting well on room air.  Cough has improved    Review of systems:  All systems reviewed and negative except for weakness, fatigue, intermittent confusion    Objective   Objective     Vitals:   Temp:  [97.6 °F (36.4 °C)-98.2 °F (36.8 °C)] 97.8 °F (36.6 °C)  Heart Rate:  [75-97] 75  Resp:  [16-22] 18  BP: (112-138)/(38-62) 123/56  Physical Exam               Constitutional: Awake, alert, ill-appearing lying in bed feeding himself breakfast              Eyes: PERRLA, sclerae anicteric, no conjunctival injection              HENT: NCAT, mucous membranes moist              Neck: Supple, full  range of motion              Respiratory: No wheezing or rhonchi, no tachypnea, on nasal cannula              Cardiovascular: RRR, no murmurs, rubs, or gallops, palpable pedal pulses bilaterally              Gastrointestinal: Positive bowel sounds, soft, nontender, nondistended              Musculoskeletal: Bilateral lower extremity edema, no clubbing or cyanosis to extremities              Psychiatric: Appropriate affect, cooperative              Neurologic: Oriented x 2 able to state his name and date of birth as well as answering to his surroundings, strength symmetric in all extremities at his baseline, Cranial Nerves grossly intact to confrontation, speech mumbles but understandable              Skin: No rashes     Result Review    Result Review:  I have personally reviewed the pertinent results from the past 24 hours to 9/8/2024 10:24 EDT and agree with these findings:  [x]  Laboratory   CBC          9/6/2024    05:10 9/7/2024    05:10 9/8/2024    05:14   CBC   WBC 9.20  10.09  10.59    RBC 3.55  3.60  3.69    Hemoglobin 10.8  10.9  11.2    Hematocrit 33.5  33.9  34.7    MCV 94.4  94.2  94.0    MCH 30.4  30.3  30.4    MCHC 32.2  32.2  32.3    RDW 14.8  14.6  14.9    Platelets 232  254  262      BMP          9/6/2024    05:10 9/7/2024    05:10 9/8/2024    05:14   BMP   BUN 65  36  43    Creatinine 7.91  5.31  6.27    Sodium 136  139  138    Potassium 4.5  3.7  3.7    Chloride 97  98  97    CO2 24.1  29.6  24.7    Calcium 8.1  8.1  7.6      LIVER FUNCTION TESTS:      [x]  Microbiology   Microbiology Results (last 10 days)       Procedure Component Value - Date/Time    S. Pneumo Ag Urine or CSF - Urine, Urine, Clean Catch [[id number]]  (Normal) Collected: 09/03/24 1207    Lab Status: Final result Specimen: Urine, Clean Catch Updated: 09/03/24 1302     Strep Pneumo Ag Negative    Legionella Antigen, Urine - Urine, Urine, Clean Catch [[id number]]  (Normal) Collected: 09/03/24 1207    Lab Status: Final result  Specimen: Urine, Clean Catch Updated: 09/03/24 1302     LEGIONELLA ANTIGEN, URINE Negative    Mycoplasma Pneumoniae Antibody, IgM - Blood, Arm, Left [[id number]]  (Abnormal) Collected: 09/03/24 0806    Lab Status: Final result Specimen: Blood from Arm, Left Updated: 09/03/24 0914     Mycoplasma pneumo IgM Positive              [x]  Radiology CT Head Without Contrast    Result Date: 9/7/2024  Impression: No acute intracranial abnormalities are identified. Electronically Signed: Matthew David MD  9/7/2024 9:50 AM EDT  Workstation ID: [id number]    CT Chest Without Contrast Diagnostic    Result Date: 9/3/2024  Impression: 1. Findings suggest congestive heart failure with small pleural effusions. Mild airspace consolidation in the lower lung fields is likely atelectasis. Superimposed pneumonia is also possible. 2. Cholelithiasis. 3. Mild stranding around the pancreas. Suggest correlation with any history of acute pancreatitis. 4. Hypodense right renal masses measuring up to 2 cm possibly representing cysts. They're indeterminate without contrast. 5. 4 cm duodenal diverticulum. Electronically Signed: Matthew David MD  9/3/2024 11:06 AM EDT  Workstation ID: [id number]    XR Chest 1 View    Result Date: 9/2/2024  Stable cardiac enlargement with chronic changes in the lung bases. Electronically Signed: Lam Reed MD  9/2/2024 9:34 PM EDT  Workstation ID: [id number]       [x]  EKG/Telemetry   ECG 12 Lead Electrolyte Imbalance   Final Result   HEART RATE=75  bpm   RR Zyyywgak=458  ms   SD Interval=  ms   P Horizontal Axis=60  deg   P Front Axis=  deg   QRSD Orkwadnh=676  ms   QT Kzrlsxiy=661  ms   XOcW=028  ms   QRS Axis=235  deg   T Wave Axis=71  deg   - ABNORMAL ECG -   Afib/flutter and ventricular-paced rhythm   Biventricular paced rhythm   No further analysis attempted due to paced rhythm   Electronically Signed By: Jerson Perry ([facility]) 2024-09-04 11:48:59   Date and Time of Study:2024-09-03 20:22:48      ECG 12 Lead  Electrolyte Imbalance   Final Result   HEART RATE=75  bpm   RR Fnhytxyx=106  ms   PA Interval=  ms   P Horizontal Axis=  deg   P Front Axis=  deg   QRSD Huoxgatr=014  ms   QT Fycsolwq=215  ms   XZpK=730  ms   QRS Axis=244  deg   T Wave Axis=57  deg   - ABNORMAL ECG -   Afib/flutter and ventricular-paced rhythm   Biventricular paced rhythm   No further analysis attempted due to paced rhythm   Electronically Signed By: Bill Rowe ([facility]) 2024-09-03 17:57:13   Date and Time of Study:2024-09-02 21:33:59          []  Cardiology/Vascular   []  Pathology  [x]  Old records  []  Other:    Assessment & Plan   Assessment / Plan     Assessment/Plan:  Assessment:  Acute hypoxemic respiratory failure  COPD with acute exacerbation    SARS-CoV-2 infection  COVID-19 causing lower respiratory tract infection  Mycoplasma pneumonia  Acute on chronic respiratory failure    ESRD (end stage renal disease)    Paroxysmal atrial fibrillation  HTN (hypertension)    DM2 (diabetes mellitus, type 2)    CAD (coronary artery disease)    Hyperkalemia      Plan:  Labs and imaging reviewed  Safety sitter at the bedside  Avoid oral or IV sedatives  Stop azithromycin and ceftriaxone  Stop prednisone  Continue Brovana Pulmicort nebs twice daily  Coreg resumed at higher dose at 9.375 mg p.o. twice daily  Continue Lipitor 10 mg nightly  Continue reduced dose gabapentin at 100 mg daily  Continue Protonix 40 mg daily  Wean oxygen per tolerance  Nephrology consulted for dialysis orders  PT/OT  AM labs  Full code  DVT prophylaxis Eliquis  Clinical course to dictate further management  Discussed with nurse at the bedside    VTE Prophylaxis:  Pharmacologic VTE prophylaxis orders are present.        CODE STATUS:   Level Of Support Discussed With: Patient  Code Status (Patient has no pulse and is not breathing): CPR (Attempt to Resuscitate)  Medical Interventions (Patient has pulse or is breathing): Full Support        Electronically signed by Kana Tavares MD,  9/8/2024, 10:24 EDT.    Portions of this documentation were transcribed electronically from a voice recognition software.  I confirm all data accurately represents the service(s) I performed at today's visit.

## 2024-09-08 NOTE — SIGNIFICANT NOTE
09/08/24 1305   Physical Therapy Time and Intention   Session Not Performed patient unavailable for evaluation  (w OT.  Declined to do PT eval following due to fatigue)

## 2024-09-08 NOTE — PLAN OF CARE
Goal Outcome Evaluation:  Plan of Care Reviewed With: patient        Progress: no change  Outcome Evaluation: Pt wore bipap last night with sitter in room. V60 is now on standby at bedside. Pt is currently on room air resting at this time.

## 2024-09-08 NOTE — THERAPY EVALUATION
Patient Name: Pedro Tilley  : 1945    MRN: [id number]                              Today's Date: 2024       Admit Date: 2024    Visit Dx:     ICD-10-CM ICD-9-CM   1. Impaired mobility and ADLs  Z74.09 V49.89    Z78.9      Patient Active Problem List   Diagnosis    Hyperkalemia    Acute on chronic respiratory failure    COPD with acute exacerbation    ESRD (end stage renal disease)    HTN (hypertension)    DM2 (diabetes mellitus, type 2)    CAD (coronary artery disease)     Past Medical History:   Diagnosis Date    Arthritis     Cancer     Coronary artery disease     Diabetes mellitus     Dialysis patient     History of transfusion     Hypertension     Kidney disease      History reviewed. No pertinent surgical history.   General Information       Row Name 24 1108          OT Time and Intention    Document Type evaluation  -     Mode of Treatment individual therapy;occupational therapy  -       Row Name 24 1108          General Information    Patient Profile Reviewed yes  -     Prior Level of Function --  patient confused, states he was (I) with ADLs and used a walker or wheelchair.  -     Existing Precautions/Restrictions fall  -     Barriers to Rehab none identified  -       Row Name 24 1108          Occupational Profile    Reason for Services/Referral (Occupational Profile) Pt. is a 78 year old male admitted for the above diagnosis. Pt. referred to OT services to assess independence with ADLs and adl transfers/fx'l mobility. No previous OT services for current condition.  -       Row Name 24 1108          Living Environment    People in Home spouse  -       Row Name 24 1108          Cognition    Orientation Status (Cognition) other (see comments)  Patient was alert able to state his name and date of birth.  He was able to follow simple commands.  -       Row Name 24 1108          Safety Issues, Functional Mobility    Impairments Affecting  Function (Mobility) balance;cognition;endurance/activity tolerance;strength  -               User Key  (r) = Recorded By, (t) = Taken By, (c) = Cosigned By      Initials Name Provider Type    Rosie Villar OT Occupational Therapist                     Mobility/ADL's       Row Name 09/08/24 1109          Bed Mobility    Bed Mobility bed mobility (all) activities  -     All Activities, [place] (Bed Mobility) moderate assist (50% patient effort);1 person assist  -     Bed Mobility, Safety Issues decreased use of arms for pushing/pulling;decreased use of legs for bridging/pushing  -     Assistive Device (Bed Mobility) head of bed elevated;bed rails;draw sheet  -       Row Name 09/08/24 1109          Transfers    Transfers sit-stand transfer  -       Row Name 09/08/24 1109          Sit-Stand Transfer    Sit-Stand [place] (Transfers) moderate assist (50% patient effort)  -       Row Name 09/08/24 1109          Functional Mobility    Functional Mobility- Ind. Level contact guard assist;1 person  -     Functional Mobility- Comment Patient sidestep towards head of bed x 6 with cues for safety and technique.  -       Row Name 09/08/24 1109          Activities of Daily Living    BADL Assessment/Intervention --  Patient is set up for self-feeding, set up for grooming, min assist for upper body bathing/dressing, max assist for lower body bathing/dressing, max assist for toileting.  -               User Key  (r) = Recorded By, (t) = Taken By, (c) = Cosigned By      Initials Name Provider Type    Rosie Villar OT Occupational Therapist                   Obj/Interventions       Row Name 09/08/24 1110          Sensory Assessment (Somatosensory)    Sensory Assessment (Somatosensory) UE sensation intact  -       Row Name 09/08/24 1110          Vision Assessment/Intervention    Visual Impairment/Limitations WFL  -       Row Name 09/08/24 1110          Range of Motion Comprehensive    General  Range of Motion bilateral upper extremity ROM WFL  -[facility] Name 09/08/24 1110          Strength Comprehensive (MMT)    Comment, General Manual Muscle Testing (MMT) Assessment BUE 4-/5  -       Row Name 09/08/24 1110          Motor Skills    Motor Skills coordination;functional endurance  -     Functional Endurance f-  -[facility] Name 09/08/24 1110          Balance    Balance Assessment sitting static balance;standing dynamic balance  -     Static Sitting Balance contact guard;1-person assist  -AC     Dynamic Standing Balance minimal assist;contact guard;1-person assist  -AC     Position/Device Used, Standing Balance supported  -     Balance Interventions standing;sit to stand;supported;minimal challenge;dynamic;occupation based/functional task  -               User Key  (r) = Recorded By, (t) = Taken By, (c) = Cosigned By      Initials Name Provider Type    AC Rosie Velazquez OT Occupational Therapist                   Goals/Plan       [facility] Name 09/08/24 1112          Bed Mobility Goal 1 (OT)    Activity/Assistive Device (Bed Mobility Goal 1, OT) bed mobility activities, all  -AC     [place] Level/Cues Needed (Bed Mobility Goal 1, OT) modified independence  -AC     Time Frame (Bed Mobility Goal 1, OT) long term goal (LTG);10 days  -[facility] Name 09/08/24 1112          Transfer Goal 1 (OT)    Activity/Assistive Device (Transfer Goal 1, OT) transfers, all  -AC     [place] Level/Cues Needed (Transfer Goal 1, OT) modified independence  -AC     Time Frame (Transfer Goal 1, OT) long term goal (LTG);10 days  -[facility] Name 09/08/24 1112          Bathing Goal 1 (OT)    Activity/Device (Bathing Goal 1, OT) bathing skills, all  -AC     [place] Level/Cues Needed (Bathing Goal 1, OT) modified independence  -AC     Time Frame (Bathing Goal 1, OT) long term goal (LTG);10 days  -[facility] Name 09/08/24 1112          Dressing Goal 1 (OT)    Activity/Device (Dressing Goal 1, OT) dressing  skills, all  -AC     [place]/Cues Needed (Dressing Goal 1, OT) modified independence  -AC     Time Frame (Dressing Goal 1, OT) long term goal (LTG);10 days  -AC       Row Name 09/08/24 1112          Toileting Goal 1 (OT)    Activity/Device (Toileting Goal 1, OT) toileting skills, all  -AC     [place] Level/Cues Needed (Toileting Goal 1, OT) modified independence  -AC     Time Frame (Toileting Goal 1, OT) long term goal (LTG);10 days  -AC       Row Name 09/08/24 1112          Strength Goal 1 (OT)    Strength Goal 1 (OT) Patient will improve BUE strength to 5/5 for adls.  -AC     Time Frame (Strength Goal 1, OT) long term goal (LTG);10 days  -AC       Row Name 09/08/24 1112          Problem Specific Goal 1 (OT)    Problem Specific Goal 1 (OT) Patient will demonstrate good activity tolerance for adls.  -AC     Time Frame (Problem Specific Goal 1, OT) long term goal (LTG);10 days  -       Row Name 09/08/24 1112          Therapy Assessment/Plan (OT)    Planned Therapy Interventions (OT) activity tolerance training;functional balance retraining;occupation/activity based interventions;ROM/therapeutic exercise;transfer/mobility retraining;patient/caregiver education/training;BADL retraining  -               User Key  (r) = Recorded By, (t) = Taken By, (c) = Cosigned By      Initials Name Provider Type    Rosie Villar OT Occupational Therapist                   Clinical Impression       Row Name 09/08/24 1111          Pain Assessment    Pretreatment Pain Rating 0/10 - no pain  -AC     Posttreatment Pain Rating 0/10 - no pain  -       Row Name 09/08/24 1111          Plan of Care Review    Plan of Care Reviewed With patient  -AC     Progress no change  -     Outcome Evaluation Patient presents with balance, strength and endurance limitations that impede his/her ability to perform ADLS. The skills of a therapist are necessary to maximize independence with ADLs.  -       Row Name 09/08/24 1111           Therapy Assessment/Plan (OT)    Patient/Family Therapy Goal Statement (OT) Patient would like to maximize independence with adls.  -AC     Rehab Potential (OT) good, to achieve stated therapy goals  -AC     Criteria for Skilled Therapeutic Interventions Met (OT) yes;meets criteria;skilled treatment is necessary  -AC     Therapy Frequency (OT) 5 times/wk  -       Row Name 09/08/24 1111          Therapy Plan Review/Discharge Plan (OT)    Equipment Needs Upon Discharge (OT) walker, rolling;commode chair  -AC     Anticipated Discharge Disposition (OT) inpatient rehabilitation facility  -       Row Name 09/08/24 1111          Positioning and Restraints    Pre-Treatment Position in bed  -AC     Post Treatment Position bed  -AC     In Bed fowlers;call light within reach;exit alarm on;encouraged to call for assist  -AC               User Key  (r) = Recorded By, (t) = Taken By, (c) = Cosigned By      Initials Name Provider Type    AC Rosie Velazquez, OT Occupational Therapist                   Outcome Measures       Row Name 09/08/24 1113          How much help from another is currently needed...    Putting on and taking off regular lower body clothing? 2  -AC     Bathing (including washing, rinsing, and drying) 2  -AC     Toileting (which includes using toilet bed pan or urinal) 2  -AC     Putting on and taking off regular upper body clothing 3  -AC     Taking care of personal grooming (such as brushing teeth) 4  -AC     Eating meals 4  -AC     AM-PAC 6 Clicks Score (OT) 17  -       Row Name 09/08/24 0759          How much help from another person do you currently need...    Turning from your back to your side while in flat bed without using bedrails? 3  -AS     Moving from lying on back to sitting on the side of a flat bed without bedrails? 3  -AS     Moving to and from a bed to a chair (including a wheelchair)? 2  -AS     Standing up from a chair using your arms (e.g., wheelchair, bedside chair)? 2  -AS     Climbing  3-5 steps with a railing? 2  -AS     To walk in hospital room? 2  -AS     AM-PAC 6 Clicks Score (PT) 14  -AS     Highest Level of Mobility Goal 4 --> Transfer to chair/commode  -AS       Row Name 09/08/24 1113          Functional Assessment    Outcome Measure Options AM-PAC 6 Clicks Daily Activity (OT);Optimal Instrument  -AC       Row Name 09/08/24 1113          Optimal Instrument    Optimal Instrument Optimal - 3  -AC     Bending/Stooping 3  -AC     Standing 3  -AC     Reaching 2  -AC     From the list, choose the 3 activities you would most like to be able to do without any difficulty Bending/stooping;Standing;Reaching  -AC     Total Score Optimal - 3 8  -AC               User Key  (r) = Recorded By, (t) = Taken By, (c) = Cosigned By      Initials Name Provider Type    Rosie Villar OT Occupational Therapist    AS Melanie Davila, RN Registered Nurse                    Occupational Therapy Education       Title: PT OT SLP Therapies (Done)       Topic: Occupational Therapy (Done)       Point: ADL training (Done)       Description:   Instruct learner(s) on proper safety adaptation and remediation techniques during self care or transfers.   Instruct in proper use of assistive devices.                  Learning Progress Summary             Patient Acceptance, E, VU by  at 9/8/2024 1114                         Point: Home exercise program (Done)       Description:   Instruct learner(s) on appropriate technique for monitoring, assisting and/or progressing therapeutic exercises/activities.                  Learning Progress Summary             Patient Acceptance, E, VU by  at 9/8/2024 1114                         Point: Precautions (Done)       Description:   Instruct learner(s) on prescribed precautions during self-care and functional transfers.                  Learning Progress Summary             Patient Acceptance, E, VU by  at 9/8/2024 1114                         Point: Body mechanics (Done)        Description:   Instruct learner(s) on proper positioning and spine alignment during self-care, functional mobility activities and/or exercises.                  Learning Progress Summary             Patient Acceptance, E, VU by  at 9/8/2024 1114                                         User Key       Initials Effective Dates Name Provider Type Discipline     06/16/21 -  Rosie Velazquez OT Occupational Therapist OT                  OT Recommendation and Plan  Planned Therapy Interventions (OT): activity tolerance training, functional balance retraining, occupation/activity based interventions, ROM/therapeutic exercise, transfer/mobility retraining, patient/caregiver education/training, BADL retraining  Therapy Frequency (OT): 5 times/wk  Plan of Care Review  Plan of Care Reviewed With: patient  Progress: no change  Outcome Evaluation: Patient presents with balance, strength and endurance limitations that impede his/her ability to perform ADLS. The skills of a therapist are necessary to maximize independence with ADLs.     Time Calculation:   Evaluation Complexity (OT)  Review Occupational Profile/Medical/Therapy History Complexity: expanded/moderate complexity  Assessment, Occupational Performance/Identification of Deficit Complexity: 1-3 performance deficits  Clinical Decision Making Complexity (OT): problem focused assessment/low complexity  Overall Complexity of Evaluation (OT): low complexity     Time Calculation- OT       Row Name 09/08/24 1118             Time Calculation- OT    OT Received On 09/08/24  -      OT Goal Re-Cert Due Date 09/17/24  -         Untimed Charges    OT Eval/Re-eval Minutes 31  -AC         Total Minutes    Untimed Charges Total Minutes 31  -AC       Total Minutes 31  -AC                User Key  (r) = Recorded By, (t) = Taken By, (c) = Cosigned By      Initials Name Provider Type     Rosie Velazquez OT Occupational Therapist                  Therapy Charges for Today       Code  Description Service Date Service Provider Modifiers Qty    [id number] HC OT EVAL LOW COMPLEXITY 3 9/8/2024 Rosie Velazquez, BRODY GO 1                 Rosie Velazquez OT  9/8/2024

## 2024-09-09 LAB
ALBUMIN SERPL-MCNC: 2.8 G/DL (ref 3.5–5.2)
ALP SERPL-CCNC: 84 U/L (ref 39–117)
ALT SERPL W P-5'-P-CCNC: 9 U/L (ref 1–41)
ANION GAP SERPL CALCULATED.3IONS-SCNC: 15.1 MMOL/L (ref 5–15)
AST SERPL-CCNC: 27 U/L (ref 1–40)
BASOPHILS # BLD AUTO: 0.08 10*3/MM3 (ref 0–0.2)
BASOPHILS NFR BLD AUTO: 0.7 % (ref 0–1.5)
BILIRUB CONJ SERPL-MCNC: 0.3 MG/DL (ref 0–0.3)
BILIRUB INDIRECT SERPL-MCNC: 0.2 MG/DL
BILIRUB SERPL-MCNC: 0.5 MG/DL (ref 0–1.2)
BUN SERPL-MCNC: 51 MG/DL (ref 8–23)
BUN/CREAT SERPL: 7.2 (ref 7–25)
CALCIUM SPEC-SCNC: 7.5 MG/DL (ref 8.6–10.5)
CHLORIDE SERPL-SCNC: 94 MMOL/L (ref 98–107)
CO2 SERPL-SCNC: 23.9 MMOL/L (ref 22–29)
CREAT SERPL-MCNC: 7.1 MG/DL (ref 0.76–1.27)
DEPRECATED RDW RBC AUTO: 50 FL (ref 37–54)
EGFRCR SERPLBLD CKD-EPI 2021: 7.4 ML/MIN/1.73
EOSINOPHIL # BLD AUTO: 0.41 10*3/MM3 (ref 0–0.4)
EOSINOPHIL NFR BLD AUTO: 3.8 % (ref 0.3–6.2)
ERYTHROCYTE [DISTWIDTH] IN BLOOD BY AUTOMATED COUNT: 14.6 % (ref 12.3–15.4)
GLUCOSE BLDC GLUCOMTR-MCNC: 118 MG/DL (ref 70–99)
GLUCOSE BLDC GLUCOMTR-MCNC: 160 MG/DL (ref 70–99)
GLUCOSE BLDC GLUCOMTR-MCNC: 95 MG/DL (ref 70–99)
GLUCOSE SERPL-MCNC: 149 MG/DL (ref 65–99)
HCT VFR BLD AUTO: 32.2 % (ref 37.5–51)
HGB BLD-MCNC: 10.4 G/DL (ref 13–17.7)
IMM GRANULOCYTES # BLD AUTO: 0.06 10*3/MM3 (ref 0–0.05)
IMM GRANULOCYTES NFR BLD AUTO: 0.5 % (ref 0–0.5)
LYMPHOCYTES # BLD AUTO: 3.67 10*3/MM3 (ref 0.7–3.1)
LYMPHOCYTES NFR BLD AUTO: 33.6 % (ref 19.6–45.3)
MAGNESIUM SERPL-MCNC: 1.6 MG/DL (ref 1.6–2.4)
MCH RBC QN AUTO: 30.1 PG (ref 26.6–33)
MCHC RBC AUTO-ENTMCNC: 32.3 G/DL (ref 31.5–35.7)
MCV RBC AUTO: 93.3 FL (ref 79–97)
MONOCYTES # BLD AUTO: 1.22 10*3/MM3 (ref 0.1–0.9)
MONOCYTES NFR BLD AUTO: 11.2 % (ref 5–12)
NEUTROPHILS NFR BLD AUTO: 5.47 10*3/MM3 (ref 1.7–7)
NEUTROPHILS NFR BLD AUTO: 50.2 % (ref 42.7–76)
NRBC BLD AUTO-RTO: 0 /100 WBC (ref 0–0.2)
PHOSPHATE SERPL-MCNC: 6.9 MG/DL (ref 2.5–4.5)
PLATELET # BLD AUTO: 262 10*3/MM3 (ref 140–450)
PMV BLD AUTO: 10.9 FL (ref 6–12)
POTASSIUM SERPL-SCNC: 3.8 MMOL/L (ref 3.5–5.2)
PROT SERPL-MCNC: 6.1 G/DL (ref 6–8.5)
RBC # BLD AUTO: 3.45 10*6/MM3 (ref 4.14–5.8)
SODIUM SERPL-SCNC: 133 MMOL/L (ref 136–145)
WBC NRBC COR # BLD AUTO: 10.91 10*3/MM3 (ref 3.4–10.8)

## 2024-09-09 PROCEDURE — 84100 ASSAY OF PHOSPHORUS: CPT | Performed by: FAMILY MEDICINE

## 2024-09-09 PROCEDURE — 94799 UNLISTED PULMONARY SVC/PX: CPT

## 2024-09-09 PROCEDURE — 82948 REAGENT STRIP/BLOOD GLUCOSE: CPT

## 2024-09-09 PROCEDURE — 94660 CPAP INITIATION&MGMT: CPT

## 2024-09-09 PROCEDURE — 83735 ASSAY OF MAGNESIUM: CPT | Performed by: FAMILY MEDICINE

## 2024-09-09 PROCEDURE — 99232 SBSQ HOSP IP/OBS MODERATE 35: CPT | Performed by: FAMILY MEDICINE

## 2024-09-09 PROCEDURE — 80048 BASIC METABOLIC PNL TOTAL CA: CPT | Performed by: FAMILY MEDICINE

## 2024-09-09 PROCEDURE — 63710000001 INSULIN LISPRO (HUMAN) PER 5 UNITS: Performed by: FAMILY MEDICINE

## 2024-09-09 PROCEDURE — 94664 DEMO&/EVAL PT USE INHALER: CPT

## 2024-09-09 PROCEDURE — 80076 HEPATIC FUNCTION PANEL: CPT | Performed by: FAMILY MEDICINE

## 2024-09-09 PROCEDURE — 94761 N-INVAS EAR/PLS OXIMETRY MLT: CPT

## 2024-09-09 PROCEDURE — 25010000002 HEPARIN (PORCINE) PER 1000 UNITS: Performed by: INTERNAL MEDICINE

## 2024-09-09 PROCEDURE — 85025 COMPLETE CBC W/AUTO DIFF WBC: CPT | Performed by: FAMILY MEDICINE

## 2024-09-09 RX ORDER — CARVEDILOL 6.25 MG/1
6.25 TABLET ORAL 2 TIMES DAILY WITH MEALS
Status: DISCONTINUED | OUTPATIENT
Start: 2024-09-09 | End: 2024-09-10 | Stop reason: HOSPADM

## 2024-09-09 RX ADMIN — CARVEDILOL 6.25 MG: 6.25 TABLET, FILM COATED ORAL at 20:02

## 2024-09-09 RX ADMIN — BUDESONIDE 0.5 MG: 0.5 SUSPENSION RESPIRATORY (INHALATION) at 07:11

## 2024-09-09 RX ADMIN — Medication 1 APPLICATION: at 11:40

## 2024-09-09 RX ADMIN — Medication 1 APPLICATION: at 21:00

## 2024-09-09 RX ADMIN — Medication 10 ML: at 08:27

## 2024-09-09 RX ADMIN — IPRATROPIUM BROMIDE AND ALBUTEROL SULFATE 3 ML: .5; 3 SOLUTION RESPIRATORY (INHALATION) at 07:11

## 2024-09-09 RX ADMIN — IPRATROPIUM BROMIDE AND ALBUTEROL SULFATE 3 ML: .5; 3 SOLUTION RESPIRATORY (INHALATION) at 00:29

## 2024-09-09 RX ADMIN — CARVEDILOL 9.38 MG: 6.25 TABLET, FILM COATED ORAL at 08:26

## 2024-09-09 RX ADMIN — HEPARIN SODIUM 3200 UNITS: 1000 INJECTION INTRAVENOUS; SUBCUTANEOUS at 18:51

## 2024-09-09 RX ADMIN — APIXABAN 2.5 MG: 2.5 TABLET, FILM COATED ORAL at 08:26

## 2024-09-09 RX ADMIN — Medication 10 ML: at 20:03

## 2024-09-09 RX ADMIN — LEVOTHYROXINE SODIUM 125 MCG: 125 TABLET ORAL at 05:48

## 2024-09-09 RX ADMIN — IPRATROPIUM BROMIDE AND ALBUTEROL SULFATE 3 ML: .5; 3 SOLUTION RESPIRATORY (INHALATION) at 13:44

## 2024-09-09 RX ADMIN — IPRATROPIUM BROMIDE AND ALBUTEROL SULFATE 3 ML: .5; 3 SOLUTION RESPIRATORY (INHALATION) at 20:15

## 2024-09-09 RX ADMIN — APIXABAN 2.5 MG: 2.5 TABLET, FILM COATED ORAL at 20:02

## 2024-09-09 RX ADMIN — ARFORMOTEROL TARTRATE 15 MCG: 15 SOLUTION RESPIRATORY (INHALATION) at 07:11

## 2024-09-09 RX ADMIN — INSULIN LISPRO 2 UNITS: 100 INJECTION, SOLUTION INTRAVENOUS; SUBCUTANEOUS at 11:40

## 2024-09-09 RX ADMIN — PANTOPRAZOLE SODIUM 40 MG: 40 TABLET, DELAYED RELEASE ORAL at 08:26

## 2024-09-09 RX ADMIN — GUAIFENESIN 600 MG: 600 TABLET ORAL at 20:02

## 2024-09-09 RX ADMIN — ARFORMOTEROL TARTRATE 15 MCG: 15 SOLUTION RESPIRATORY (INHALATION) at 20:15

## 2024-09-09 RX ADMIN — BUDESONIDE 0.5 MG: 0.5 SUSPENSION RESPIRATORY (INHALATION) at 20:15

## 2024-09-09 RX ADMIN — ATORVASTATIN CALCIUM 10 MG: 10 TABLET, FILM COATED ORAL at 20:02

## 2024-09-09 RX ADMIN — GUAIFENESIN 600 MG: 600 TABLET ORAL at 08:26

## 2024-09-09 NOTE — PLAN OF CARE
Goal Outcome Evaluation:  Plan of Care Reviewed With: patient, spouse         VSS during shift. Patient was able to ambulate to BSC with 1 assist. Patient A/O and pleasant. Sitter was Dc'd per Dr. Tavares. No reports of pain. Dialysis completed, see notes. Glucose checked per order and treated per MAR. Continue plan of care.

## 2024-09-09 NOTE — SIGNIFICANT NOTE
09/09/24 0719   OTHER   Discipline occupational therapist   Rehab Time/Intention   Session Not Performed patient unavailable for treatment  (with MD)

## 2024-09-09 NOTE — PROGRESS NOTES
[facility]   Hospitalist Progress Note  Date: 2024  Patient Name: Pedro Tilley  : 1945  MRN: [id number]  Date of admission: 2024      Subjective   Subjective   Chief complaint: Shortness of breath, generalized weakness    Summary:  78-year-old male with end-stage renal disease on hemodialysis, diabetes, hypertension, CAD, COPD, GERD without esophagitis, chronic diastolic heart failure, hospitalized on 2024 as a transfer from an outside facility for weakness and shortness of breath/chief complaint, he was placed on antibiotics after chest x-ray showed pneumonia and he is found to be COVID-19 positive, he was hypoxemic upon transfer to our facility, he tested positive for mycoplasma, started on azithromycin, steroids, pulmonary consulted, nephrology consulted.  Overnight , confused, pulling at lines and tubes, received sedating medications, AMS thereafter, mentation slow to improve, mentation improved as of 2024, wife wants to take him home after dialysis on 9/10/2024 if mentation remains stable.    Interval follow-up: Seen and examined this morning, no acute distress, safety sitter at bedside, patient's mentation close to his baseline, I have discontinued the safety sitter.  Wife indicates this is his normal, and has some confusion after dialysis.  He has dialysis today, patient wants to take him home tomorrow if he remains stable from a mentation standpoint.  His breathing is well, on room air.  Blood pressures are soft but stable, heart rate in the 70s.  No chest pain or palpitations.  Creatinine one 7.1, BUN 51, sodium 133, potassium 3.8, white blood cell count 10,000, hemoglobin 10.4.    Review of systems:  All systems reviewed and negative except for weakness, fatigue, less confusion    Objective   Objective     Vitals:   Temp:  [96.8 °F (36 °C)-98.1 °F (36.7 °C)] 97.7 °F (36.5 °C)  Heart Rate:  [75] 75  Resp:  [14-20] 18  BP: ()/(36-71) 108/38  Flow (L/min):  [2]  2  Physical Exam               Constitutional: Awake, alert, ill-appearing lying in bed wife at the bedside              Eyes: PERRLA, sclerae anicteric, no conjunctival injection              HENT: NCAT, mucous membranes moist              Neck: Supple, full range of motion              Respiratory: No wheezing or rhonchi, no tachypnea, on nasal cannula              Cardiovascular: RRR, no murmurs, rubs, or gallops, palpable pedal pulses bilaterally              Gastrointestinal: Positive bowel sounds, soft, nontender, nondistended              Musculoskeletal: Bilateral lower extremity edema, no clubbing or cyanosis to extremities              Psychiatric: Appropriate affect, cooperative              Neurologic: Oriented x 3, strength symmetric in all extremities at his baseline, Cranial Nerves grossly intact to confrontation, speech mumbles but understandable              Skin: No rashes     Result Review    Result Review:  I have personally reviewed the pertinent results from the past 24 hours to 9/9/2024 10:14 EDT and agree with these findings:  [x]  Laboratory   CBC          9/7/2024    05:10 9/8/2024    05:14 9/9/2024    05:08   CBC   WBC 10.09  10.59  10.91    RBC 3.60  3.69  3.45    Hemoglobin 10.9  11.2  10.4    Hematocrit 33.9  34.7  32.2    MCV 94.2  94.0  93.3    MCH 30.3  30.4  30.1    MCHC 32.2  32.3  32.3    RDW 14.6  14.9  14.6    Platelets 254  262  262      BMP          9/7/2024    05:10 9/8/2024    05:14 9/9/2024    05:08   BMP   BUN 36  43  51    Creatinine 5.31  6.27  7.10    Sodium 139  138  133    Potassium 3.7  3.7  3.8    Chloride 98  97  94    CO2 29.6  24.7  23.9    Calcium 8.1  7.6  7.5      LIVER FUNCTION TESTS:      [x]  Microbiology   Microbiology Results (last 10 days)       Procedure Component Value - Date/Time    S. Pneumo Ag Urine or CSF - Urine, Urine, Clean Catch [[id number]]  (Normal) Collected: 09/03/24 1207    Lab Status: Final result Specimen: Urine, Clean Catch Updated:  09/03/24 1302     Strep Pneumo Ag Negative    Legionella Antigen, Urine - Urine, Urine, Clean Catch [[id number]]  (Normal) Collected: 09/03/24 1207    Lab Status: Final result Specimen: Urine, Clean Catch Updated: 09/03/24 1302     LEGIONELLA ANTIGEN, URINE Negative    Mycoplasma Pneumoniae Antibody, IgM - Blood, Arm, Left [[id number]]  (Abnormal) Collected: 09/03/24 0806    Lab Status: Final result Specimen: Blood from Arm, Left Updated: 09/03/24 0914     Mycoplasma pneumo IgM Positive              [x]  Radiology CT Head Without Contrast    Result Date: 9/7/2024  Impression: No acute intracranial abnormalities are identified. Electronically Signed: Matthew David MD  9/7/2024 9:50 AM EDT  Workstation ID: [id number]    CT Chest Without Contrast Diagnostic    Result Date: 9/3/2024  Impression: 1. Findings suggest congestive heart failure with small pleural effusions. Mild airspace consolidation in the lower lung fields is likely atelectasis. Superimposed pneumonia is also possible. 2. Cholelithiasis. 3. Mild stranding around the pancreas. Suggest correlation with any history of acute pancreatitis. 4. Hypodense right renal masses measuring up to 2 cm possibly representing cysts. They're indeterminate without contrast. 5. 4 cm duodenal diverticulum. Electronically Signed: Matthew David MD  9/3/2024 11:06 AM EDT  Workstation ID: [id number]    XR Chest 1 View    Result Date: 9/2/2024  Stable cardiac enlargement with chronic changes in the lung bases. Electronically Signed: Lam Reed MD  9/2/2024 9:34 PM EDT  Workstation ID: [id number]       [x]  EKG/Telemetry   ECG 12 Lead Electrolyte Imbalance   Final Result   HEART RATE=75  bpm   RR Jfhbcrzv=366  ms   MS Interval=  ms   P Horizontal Axis=60  deg   P Front Axis=  deg   QRSD Xslsbyvi=114  ms   QT Ztlymhtu=344  ms   WPpR=288  ms   QRS Axis=235  deg   T Wave Axis=71  deg   - ABNORMAL ECG -   Afib/flutter and ventricular-paced rhythm   Biventricular paced rhythm   No further  analysis attempted due to paced rhythm   Electronically Signed By: Jerson Perry ([facility]) 2024-09-04 11:48:59   Date and Time of Study:2024-09-03 20:22:48      ECG 12 Lead Electrolyte Imbalance   Final Result   HEART RATE=75  bpm   RR Nsnguckm=167  ms   UT Interval=  ms   P Horizontal Axis=  deg   P Front Axis=  deg   QRSD Hntvznqc=716  ms   QT Sctisxoo=179  ms   VWkB=698  ms   QRS Axis=244  deg   T Wave Axis=57  deg   - ABNORMAL ECG -   Afib/flutter and ventricular-paced rhythm   Biventricular paced rhythm   No further analysis attempted due to paced rhythm   Electronically Signed By: Bill Rowe ([facility]) 2024-09-03 17:57:13   Date and Time of Study:2024-09-02 21:33:59          []  Cardiology/Vascular   []  Pathology  [x]  Old records  []  Other:    Assessment & Plan   Assessment / Plan     Assessment/Plan:  Assessment:  Acute hypoxemic respiratory failure  COPD with acute exacerbation    SARS-CoV-2 infection  COVID-19 causing lower respiratory tract infection  Mycoplasma pneumonia  Acute on chronic respiratory failure    ESRD (end stage renal disease)    Paroxysmal atrial fibrillation  HTN (hypertension)    DM2 (diabetes mellitus, type 2)    CAD (coronary artery disease)    Hyperkalemia      Plan:  Labs and imaging reviewed  Discontinue safety sitter  Avoid oral or IV sedatives  Dialysis today  Continue Brovana Pulmicort nebs twice daily  Reduce Coreg dose to 6.25 mg twice a day, may be can tolerate a lower dose better  Continue Lipitor 10 mg nightly  Continue reduced dose gabapentin at 100 mg daily  Continue Protonix 40 mg daily  Wean oxygen per tolerance  Nephrology consulted for dialysis orders  PT/OT  AM labs  Full code  DVT prophylaxis Eliquis  Clinical course to dictate further management  Discussed with nurse at the bedside  Home tomorrow if mentation stable    VTE Prophylaxis:  Pharmacologic VTE prophylaxis orders are present.        CODE STATUS:   Level Of Support Discussed With: Patient  Code Status  (Patient has no pulse and is not breathing): CPR (Attempt to Resuscitate)  Medical Interventions (Patient has pulse or is breathing): Full Support        Electronically signed by Kana Tavares MD, 9/9/2024, 10:14 EDT.    Portions of this documentation were transcribed electronically from a voice recognition software.  I confirm all data accurately represents the service(s) I performed at today's visit.

## 2024-09-09 NOTE — PROGRESS NOTES
[facility] Progress Note      Patient Name: Pedro Tilley  : 1945  MRN: [id number]  Primary Care Physician:  Yessica Sr, WENDY  Date of admission: 2024    Subjective     Interval History:   F/u ESRD    Review of Systems:   Less confused today, seen on dialysis.  Conversant and pleasant.   He denies dyspnea, tolerating p.o.    Objective     Vitals:   Temp:  [96.8 °F (36 °C)-97.7 °F (36.5 °C)] 97.7 °F (36.5 °C)  Heart Rate:  [75-77] 75  Resp:  [14-18] 18  BP: ()/(36-70) 151/69    Intake/Output Summary (Last 24 hours) at 2024  Last data filed at 2024  Gross per 24 hour   Intake --   Output 3000 ml   Net -3000 ml       Physical Exam:    General Appearance: frail confused WM in no acute distres  Neck: supple, no JVD  Lungs: Coarse BS bilat  Heart: RRR, normal S1 and S2  Abdomen: soft, nontender, nondistended  : no palpable bladder  Extremities: no edema, cyanosis or clubbing, right upper extremity AV fistula incision is healing well, good thrill and bruit.  Using right IJ TDC.    Scheduled Meds:     apixaban, 2.5 mg, Oral, BID  arformoterol, 15 mcg, Nebulization, BID - RT  atorvastatin, 10 mg, Oral, Nightly  budesonide, 0.5 mg, Nebulization, BID - RT  carvedilol, 6.25 mg, Oral, BID With Meals  dimethicone, 1 Application, Topical, 2 times per day  guaiFENesin, 600 mg, Oral, Q12H  insulin lispro, 2-7 Units, Subcutaneous, 4x Daily AC & at Bedtime  ipratropium-albuterol, 3 mL, Nebulization, Q6H - RT  levothyroxine, 125 mcg, Oral, Q AM  pantoprazole, 40 mg, Oral, Daily  sodium chloride, 10 mL, Intravenous, Q12H      IV Meds:        Results Reviewed:   I have personally reviewed the results from the time of this admission to 2024 19:15 EDT     Results from last 7 days   Lab Units 24  0508 24  0514 24  0510   SODIUM mmol/L 133* 138 139   POTASSIUM mmol/L 3.8 3.7 3.7   CHLORIDE mmol/L 94* 97* 98   CO2 mmol/L 23.9 24.7 29.6*   BUN  mg/dL 51* 43* 36*   CREATININE mg/dL 7.10* 6.27* 5.31*   CALCIUM mg/dL 7.5* 7.6* 8.1*   BILIRUBIN mg/dL 0.5 0.5 0.5   ALK PHOS U/L 84 70 73   ALT (SGPT) U/L 9 8 8   AST (SGOT) U/L 27 28 29   GLUCOSE mg/dL 149* 81 84     Estimated Creatinine Clearance: 11.3 mL/min (A) (by C-G formula based on SCr of 7.1 mg/dL (H)).  Results from last 7 days   Lab Units 09/09/24  0508 09/08/24  0514 09/07/24  0510   MAGNESIUM mg/dL 1.6 1.6 1.6   PHOSPHORUS mg/dL 6.9* 6.1* 5.0*         Results from last 7 days   Lab Units 09/09/24  0508 09/08/24  0514 09/07/24  0510 09/06/24  0510 09/05/24  0506   WBC 10*3/mm3 10.91* 10.59 10.09 9.20 9.55   HEMOGLOBIN g/dL 10.4* 11.2* 10.9* 10.8* 10.9*   PLATELETS 10*3/mm3 262 262 254 232 229           Assessment / Plan     ASSESSMENT:  ESRD- HD MWF via RIJ TDC, s/p cath-stone for poor fcn.  Working well today.  Seen on dialysis, volume status and electrolytes are stable.       HTN- controlled on coreg   DMII- mgmt per primary team  Anemia of CKD, hgb stable.   COVID-19 PNA. S/P abx also for mycoplasma   Acute encephalopathy - hard to attribute this to dialysis, but wife insists tends to worsen post dialysis.  Suspect related in part to respiratory acidosis, VBG noted on 9/6 (pCO2 55).  CT head unremarkable.  Mentation is improving..    PLAN:  HD MWF schedule.  Seen on dialysis today.  Okay with discharge from renal standpoint.  Discussed with dialysis staff.    Please call with any questions.       Harika Razo MD  09/09/24  19:15 EDT    [facility]  [phone]

## 2024-09-09 NOTE — SIGNIFICANT NOTE
09/09/24 1427   Physical Therapy Time and Intention   Session Not Performed patient unavailable for evaluation;other (see comments)  (off unit for dialysis)

## 2024-09-09 NOTE — PLAN OF CARE
Goal Outcome Evaluation:      BIPAP worn 2 hours this shift. Stated he was not able to sleep with mask on. Will continue to encourage BIPAP use.

## 2024-09-09 NOTE — PLAN OF CARE
Goal Outcome Evaluation:  Plan of Care Reviewed With: patient, spouse        Progress: no change  Outcome Evaluation: Patient is in room air this at this time. Wore bipap 12/5, rr12, 24% for a couple of hours last night.

## 2024-09-09 NOTE — NURSING NOTE
Duration of Treatment 4.0 Hours   Access Site Tunneled Dialysis Catheter   Dialyzer Revaclear    mL/min   Dialysate Temperature (C) 36   BFR-As tolerated to a maximum of: 350 mL/min   Dialysate Solution Bath: K+ = 3 mEq, Ca = 2.5mEq   Bicarb 35 mEq   Na+ 137 meq   Fluid Removal: 3     Pt tolerated well.  Removed 3L  BLP: 84  Time: 4hrs

## 2024-09-09 NOTE — PLAN OF CARE
Goal Outcome Evaluation:         Pt was able to answer all orientation questions this shift. Pt has been on room air. Sitter at bedside along with wife. No complaints of pain or SOA. Will continue plan of care.

## 2024-09-09 NOTE — PROGRESS NOTES
RT EQUIPMENT DEVICE RELATED - SKIN ASSESSMENT    RT Medical Equipment/Device:     NIV Mask:  Under-the-nose   size:  b    Skin Assessment:      Cheek:  Intact  Chin:  Intact  Nose:  Intact  Mouth:  Intact    Device Skin Pressure Protection:  Positioning supports utilized    Nurse Notification:  Ernestina Cabrera, RRT

## 2024-09-09 NOTE — PROGRESS NOTES
"Enter Query Response Below      Query Response: - Acute hypoxic and hypercapnic respiratory failure were not supported    Electronically signed by Kana Tavares MD, 24, 11:19 AM EDT.               If applicable, please update the problem list.     Patient: Pedro Tilley        : 1945  Account: [id number]           Admit Date:         How to Respond to this query:       a. Click New Note     b. Answer query within the yellow box.                c. Update the Problem List, if applicable.      If you have any questions about this query contact me at: [email or web address]     Dr. Tavares,    Patient with history of ESRD presented in transfer  from outside facility for management of ESRD. Notes include COVID-19 pneumonia, Mycoplasma pneumonia, and COPD exacerbation. H&P notes past medical history of COPD on as needed oxygen, \"At our facility patient was slightly hypoxic on arrival with significant wheezing and tachypnea. ABG showed he was in hypercapnic respiratory failure,\" acute on chronic respiratory failure in the Active Hospital Problems list, and \"Respiratory: Bilateral wheezing, rhonchi, no tachypnea, on nasal cannula.\" Pulmonology consult notes acute on chronic hypoxic respiratory failure. Patient treated with monitoring, supplemental oxygen, nebulizers, Solu-Medrol 9/3 - , PO prednisone , and IV antibiotics.     Lab values this encounter include:  VBG 9/3: PaCO2 53.3; pH 7.244; PaO2 44.7  ABG : PaCO2 49.1; pH 7.367    Nursing flowsheet documentation includes:  : SpO2 95 - 99% on room air - 2 L/min  9/3: SpO2 96 - 100% on room air - 1 L/min  : SpO2 96 - 100% on 1 - 2 L/min    After study, was acute hypoxic and hypercapnic respiratory failure clinically supported during this admission?     - Acute hypoxic and hypercapnic respiratory failure were supported with additional clinical indicators ___________________  - Acute hypoxic and hypercapnic respiratory failure were not " supported  - Other ___________________  - Unable to clinically determine      By submitting this query, we are merely seeking further clarification of documentation to accurately reflect all conditions that you are monitoring, evaluating, treating or that extend the hospitalization or utilize additional resources of care. Please utilize your independent clinical judgment when addressing the question(s) above.     This query and your response, once completed, will be entered into the legal medical record.    Sincerely,  Butch Taylor RN, CDS  Clinical Documentation Integrity Program

## 2024-09-10 ENCOUNTER — READMISSION MANAGEMENT (OUTPATIENT)
Dept: CALL CENTER | Facility: HOSPITAL | Age: 79
End: 2024-09-10
Payer: MEDICARE

## 2024-09-10 VITALS
OXYGEN SATURATION: 100 % | HEART RATE: 75 BPM | BODY MASS INDEX: 34.57 KG/M2 | WEIGHT: 246.91 LBS | SYSTOLIC BLOOD PRESSURE: 127 MMHG | RESPIRATION RATE: 18 BRPM | TEMPERATURE: 97.9 F | DIASTOLIC BLOOD PRESSURE: 56 MMHG | HEIGHT: 71 IN

## 2024-09-10 LAB
ALBUMIN SERPL-MCNC: 2.7 G/DL (ref 3.5–5.2)
ALP SERPL-CCNC: 76 U/L (ref 39–117)
ALT SERPL W P-5'-P-CCNC: 9 U/L (ref 1–41)
ANION GAP SERPL CALCULATED.3IONS-SCNC: 14.1 MMOL/L (ref 5–15)
AST SERPL-CCNC: 28 U/L (ref 1–40)
BASOPHILS # BLD AUTO: 0.06 10*3/MM3 (ref 0–0.2)
BASOPHILS NFR BLD AUTO: 0.6 % (ref 0–1.5)
BILIRUB CONJ SERPL-MCNC: 0.3 MG/DL (ref 0–0.3)
BILIRUB INDIRECT SERPL-MCNC: 0.3 MG/DL
BILIRUB SERPL-MCNC: 0.6 MG/DL (ref 0–1.2)
BUN SERPL-MCNC: 25 MG/DL (ref 8–23)
BUN/CREAT SERPL: 5.3 (ref 7–25)
CALCIUM SPEC-SCNC: 7.8 MG/DL (ref 8.6–10.5)
CHLORIDE SERPL-SCNC: 98 MMOL/L (ref 98–107)
CO2 SERPL-SCNC: 22.9 MMOL/L (ref 22–29)
CREAT SERPL-MCNC: 4.72 MG/DL (ref 0.76–1.27)
DEPRECATED RDW RBC AUTO: 50.4 FL (ref 37–54)
EGFRCR SERPLBLD CKD-EPI 2021: 12.1 ML/MIN/1.73
EOSINOPHIL # BLD AUTO: 0.38 10*3/MM3 (ref 0–0.4)
EOSINOPHIL NFR BLD AUTO: 3.8 % (ref 0.3–6.2)
ERYTHROCYTE [DISTWIDTH] IN BLOOD BY AUTOMATED COUNT: 14.6 % (ref 12.3–15.4)
GLUCOSE BLDC GLUCOMTR-MCNC: 147 MG/DL (ref 70–99)
GLUCOSE BLDC GLUCOMTR-MCNC: 151 MG/DL (ref 70–99)
GLUCOSE SERPL-MCNC: 94 MG/DL (ref 65–99)
HCT VFR BLD AUTO: 31.8 % (ref 37.5–51)
HGB BLD-MCNC: 10.2 G/DL (ref 13–17.7)
IMM GRANULOCYTES # BLD AUTO: 0.06 10*3/MM3 (ref 0–0.05)
IMM GRANULOCYTES NFR BLD AUTO: 0.6 % (ref 0–0.5)
LYMPHOCYTES # BLD AUTO: 2.95 10*3/MM3 (ref 0.7–3.1)
LYMPHOCYTES NFR BLD AUTO: 29.7 % (ref 19.6–45.3)
MAGNESIUM SERPL-MCNC: 1.4 MG/DL (ref 1.6–2.4)
MCH RBC QN AUTO: 29.9 PG (ref 26.6–33)
MCHC RBC AUTO-ENTMCNC: 32.1 G/DL (ref 31.5–35.7)
MCV RBC AUTO: 93.3 FL (ref 79–97)
MONOCYTES # BLD AUTO: 1.37 10*3/MM3 (ref 0.1–0.9)
MONOCYTES NFR BLD AUTO: 13.8 % (ref 5–12)
NEUTROPHILS NFR BLD AUTO: 5.1 10*3/MM3 (ref 1.7–7)
NEUTROPHILS NFR BLD AUTO: 51.5 % (ref 42.7–76)
NRBC BLD AUTO-RTO: 0 /100 WBC (ref 0–0.2)
PHOSPHATE SERPL-MCNC: 4.4 MG/DL (ref 2.5–4.5)
PLATELET # BLD AUTO: 254 10*3/MM3 (ref 140–450)
PMV BLD AUTO: 11.2 FL (ref 6–12)
POTASSIUM SERPL-SCNC: 3.7 MMOL/L (ref 3.5–5.2)
PROT SERPL-MCNC: 6.2 G/DL (ref 6–8.5)
RBC # BLD AUTO: 3.41 10*6/MM3 (ref 4.14–5.8)
SODIUM SERPL-SCNC: 135 MMOL/L (ref 136–145)
WBC NRBC COR # BLD AUTO: 9.92 10*3/MM3 (ref 3.4–10.8)

## 2024-09-10 PROCEDURE — 94799 UNLISTED PULMONARY SVC/PX: CPT

## 2024-09-10 PROCEDURE — 80048 BASIC METABOLIC PNL TOTAL CA: CPT | Performed by: FAMILY MEDICINE

## 2024-09-10 PROCEDURE — 82948 REAGENT STRIP/BLOOD GLUCOSE: CPT | Performed by: FAMILY MEDICINE

## 2024-09-10 PROCEDURE — 63710000001 INSULIN LISPRO (HUMAN) PER 5 UNITS: Performed by: FAMILY MEDICINE

## 2024-09-10 PROCEDURE — 99239 HOSP IP/OBS DSCHRG MGMT >30: CPT | Performed by: STUDENT IN AN ORGANIZED HEALTH CARE EDUCATION/TRAINING PROGRAM

## 2024-09-10 PROCEDURE — 83735 ASSAY OF MAGNESIUM: CPT | Performed by: FAMILY MEDICINE

## 2024-09-10 PROCEDURE — 25010000002 MAGNESIUM SULFATE 2 GM/50ML SOLUTION: Performed by: STUDENT IN AN ORGANIZED HEALTH CARE EDUCATION/TRAINING PROGRAM

## 2024-09-10 PROCEDURE — 82948 REAGENT STRIP/BLOOD GLUCOSE: CPT

## 2024-09-10 PROCEDURE — 94761 N-INVAS EAR/PLS OXIMETRY MLT: CPT

## 2024-09-10 PROCEDURE — 85025 COMPLETE CBC W/AUTO DIFF WBC: CPT | Performed by: FAMILY MEDICINE

## 2024-09-10 PROCEDURE — 80076 HEPATIC FUNCTION PANEL: CPT | Performed by: FAMILY MEDICINE

## 2024-09-10 PROCEDURE — 84100 ASSAY OF PHOSPHORUS: CPT | Performed by: FAMILY MEDICINE

## 2024-09-10 PROCEDURE — 94664 DEMO&/EVAL PT USE INHALER: CPT

## 2024-09-10 PROCEDURE — 97161 PT EVAL LOW COMPLEX 20 MIN: CPT

## 2024-09-10 RX ORDER — MUPIROCIN 20 MG/G
1 OINTMENT TOPICAL 3 TIMES DAILY
Status: DISCONTINUED | OUTPATIENT
Start: 2024-09-10 | End: 2024-09-10 | Stop reason: HOSPADM

## 2024-09-10 RX ORDER — MAGNESIUM SULFATE HEPTAHYDRATE 40 MG/ML
2 INJECTION, SOLUTION INTRAVENOUS ONCE
Status: COMPLETED | OUTPATIENT
Start: 2024-09-10 | End: 2024-09-10

## 2024-09-10 RX ORDER — GUAIFENESIN 600 MG/1
600 TABLET, EXTENDED RELEASE ORAL EVERY 12 HOURS SCHEDULED
Qty: 10 TABLET | Refills: 0 | Status: SHIPPED | OUTPATIENT
Start: 2024-09-10 | End: 2024-09-15

## 2024-09-10 RX ADMIN — GUAIFENESIN 600 MG: 600 TABLET ORAL at 08:02

## 2024-09-10 RX ADMIN — MAGNESIUM SULFATE HEPTAHYDRATE 2 G: 40 INJECTION, SOLUTION INTRAVENOUS at 10:51

## 2024-09-10 RX ADMIN — IPRATROPIUM BROMIDE AND ALBUTEROL SULFATE 3 ML: .5; 3 SOLUTION RESPIRATORY (INHALATION) at 06:57

## 2024-09-10 RX ADMIN — PANTOPRAZOLE SODIUM 40 MG: 40 TABLET, DELAYED RELEASE ORAL at 08:02

## 2024-09-10 RX ADMIN — ARFORMOTEROL TARTRATE 15 MCG: 15 SOLUTION RESPIRATORY (INHALATION) at 06:57

## 2024-09-10 RX ADMIN — IPRATROPIUM BROMIDE AND ALBUTEROL SULFATE 3 ML: .5; 3 SOLUTION RESPIRATORY (INHALATION) at 11:44

## 2024-09-10 RX ADMIN — INSULIN LISPRO 2 UNITS: 100 INJECTION, SOLUTION INTRAVENOUS; SUBCUTANEOUS at 08:06

## 2024-09-10 RX ADMIN — APIXABAN 2.5 MG: 2.5 TABLET, FILM COATED ORAL at 08:02

## 2024-09-10 RX ADMIN — Medication 1 APPLICATION: at 10:54

## 2024-09-10 RX ADMIN — Medication 10 ML: at 08:02

## 2024-09-10 RX ADMIN — BUDESONIDE 0.5 MG: 0.5 SUSPENSION RESPIRATORY (INHALATION) at 06:57

## 2024-09-10 RX ADMIN — LEVOTHYROXINE SODIUM 125 MCG: 125 TABLET ORAL at 05:10

## 2024-09-10 RX ADMIN — IPRATROPIUM BROMIDE AND ALBUTEROL SULFATE 3 ML: .5; 3 SOLUTION RESPIRATORY (INHALATION) at 01:23

## 2024-09-10 NOTE — PROGRESS NOTES
[facility] Progress Note      Patient Name: Pedro Tilley  : 1945  MRN: [id number]  Primary Care Physician:  Yessica Sr, WENDY  Date of admission: 2024    Subjective     Interval History:   F/u ESRD    Review of Systems:   Seen earlier this afternoon, feeling well.  Going home.  No new complaints, family at bedside.    He denies dyspnea, tolerating p.o.    Objective     Vitals:   Temp:  [97.9 °F (36.6 °C)-98.1 °F (36.7 °C)] 97.9 °F (36.6 °C)  Heart Rate:  [75] 75  Resp:  [16-18] 18  BP: (100-127)/(50-73) 127/56  Flow (L/min):  [0] 0    Intake/Output Summary (Last 24 hours) at 9/10/2024 1958  Last data filed at 9/10/2024 1244  Gross per 24 hour   Intake 120 ml   Output --   Net 120 ml       Physical Exam:    General Appearance: frail confused WM in no acute distres  Neck: supple, no JVD  Lungs: Coarse BS bilat  Heart: RRR, normal S1 and S2  Abdomen: soft, nontender, nondistended  : no palpable bladder  Extremities: no edema, cyanosis or clubbing, right upper extremity AV fistula incision is healing well, good thrill and bruit.  Using right IJ TDC..    Scheduled Meds:         IV Meds:   No current facility-administered medications for this encounter.      Results Reviewed:   I have personally reviewed the results from the time of this admission to 9/10/2024 19:58 EDT     Results from last 7 days   Lab Units 09/10/24  0510 24  0508 24  0514   SODIUM mmol/L 135* 133* 138   POTASSIUM mmol/L 3.7 3.8 3.7   CHLORIDE mmol/L 98 94* 97*   CO2 mmol/L 22.9 23.9 24.7   BUN mg/dL 25* 51* 43*   CREATININE mg/dL 4.72* 7.10* 6.27*   CALCIUM mg/dL 7.8* 7.5* 7.6*   BILIRUBIN mg/dL 0.6 0.5 0.5   ALK PHOS U/L 76 84 70   ALT (SGPT) U/L 9 9 8   AST (SGOT) U/L 28 27 28   GLUCOSE mg/dL 94 149* 81     Estimated Creatinine Clearance: 16.9 mL/min (A) (by C-G formula based on SCr of 4.72 mg/dL (H)).  Results from last 7 days   Lab Units 09/10/24  0510 24  0147  09/08/24  0514   MAGNESIUM mg/dL 1.4* 1.6 1.6   PHOSPHORUS mg/dL 4.4 6.9* 6.1*         Results from last 7 days   Lab Units 09/10/24  0510 09/09/24  0508 09/08/24  0514 09/07/24  0510 09/06/24  0510   WBC 10*3/mm3 9.92 10.91* 10.59 10.09 9.20   HEMOGLOBIN g/dL 10.2* 10.4* 11.2* 10.9* 10.8*   PLATELETS 10*3/mm3 254 262 262 254 232           Assessment / Plan     ASSESSMENT and plan:  ESRD- HD MWF via RIJ TDC, s/p cath-stone for poor fcn. volume status and electrolytes are stable.  Follow-up dialysis tomorrow in [place] as scheduled.     HTN- controlled on coreg   DMII- mgmt per primary team  Anemia of CKD, hgb stable.   COVID-19 PNA. S/P abx also for mycoplasma   Acute encephalopathy - hard to attribute this to dialysis, but wife insists tends to worsen post dialysis.  Suspect related in part to respiratory acidosis, VBG noted on 9/6 (pCO2 55).  CT head unremarkable.  Mentation is improving..    Discussed with family and nursing staff.    Harika Razo MD  09/10/24  19:58 EDT    [facility]  [phone]

## 2024-09-10 NOTE — DISCHARGE SUMMARY
[facility]         HOSPITALIST  DISCHARGE SUMMARY    Patient Name: Pedro Tilley  : 1945  MRN: [id number]    Date of Admission: 2024  Date of Discharge:  9/10/2024  Primary Care Physician: Yessica Sr APRN    Consults       Date and Time Order Name Status Description    9/3/2024  6:51 AM Inpatient Pulmonology Consult Completed     2024  9:02 PM Inpatient Nephrology Consult              Active and Resolved Hospital Problems:  Active Hospital Problems    Diagnosis POA   • **COPD with acute exacerbation [J44.1] Unknown   • Acute on chronic respiratory failure [J96.20] Unknown   • ESRD (end stage renal disease) [N18.6] Unknown   • HTN (hypertension) [I10] Unknown   • DM2 (diabetes mellitus, type 2) [E11.9] Unknown   • CAD (coronary artery disease) [I25.10] Unknown   • Hyperkalemia [E87.5] Yes      Resolved Hospital Problems   No resolved problems to display.     Acute hypoxemic respiratory failure  COPD with acute exacerbation    SARS-CoV-2 infection  COVID-19 causing lower respiratory tract infection  Mycoplasma pneumonia  Acute on chronic respiratory failure    ESRD (end stage renal disease)    Paroxysmal atrial fibrillation  HTN (hypertension)    DM2 (diabetes mellitus, type 2)    CAD (coronary artery disease)    Hyperkalemia      Hospital Course     Hospital Course:  Pedro Tilley is a 78 y.o. male with end-stage renal disease on hemodialysis, diabetes, hypertension, CAD, COPD, GERD without esophagitis, chronic diastolic heart failure, hospitalized on 2024 as a transfer from an outside facility for weakness and shortness of breath/chief complaint, he was placed on antibiotics after chest x-ray showed pneumonia and he is found to be COVID-19 positive, he was hypoxemic upon transfer to our facility, he tested positive for mycoplasma, started on azithromycin, steroids, pulmonary consulted, nephrology consulted. Overnight sundowning, confused, pulling at lines and tubes,  received sedating medications, AMS thereafter, mentation slow to improve, mentation improved as of 9/9/2024, wife wants to take him home after dialysis on 9/10/2024 if mentation remains stable. Mentation currently at baseline. Patient is thus being discharged today.    Patient is being discharged home with [facility] today. He is stable at the time of discharge. He will follow up with PCP in 3-7 days; needs CBC and chemistries trended during follow up. Follow up with Nephrology. Advised to be compliant with medications. Fall precautions.        DISCHARGE Follow Up Recommendations for labs and diagnostics: as mentioned above.      Day of Discharge     Vital Signs:  Temp:  [97.7 °F (36.5 °C)-98.1 °F (36.7 °C)] 97.9 °F (36.6 °C)  Heart Rate:  [75-77] 75  Resp:  [16-18] 18  BP: (100-151)/(50-73) 127/56  Flow (L/min):  [0] 0  Physical Exam:   Constitutional: Awake, alert  Respiratory: No wheezing or rhonchi, no tachypnea, on nasal cannula              Cardiovascular: RRR, no murmurs, rubs, or gallops, palpable pedal pulses bilaterally              Gastrointestinal: Positive bowel sounds, soft, nontender, nondistended                                          Discharge Details        Discharge Medications        New Medications        Instructions Start Date   dimethicone 1.3 % lotion lotion  Commonly known as: AVEENO   1 Application, Topical, 2 Times Daily      guaiFENesin 600 MG 12 hr tablet  Commonly known as: MUCINEX   600 mg, Oral, Every 12 Hours Scheduled             Continue These Medications        Instructions Start Date   allopurinol 100 MG tablet  Commonly known as: ZYLOPRIM   100 mg, Oral, Daily      apixaban 2.5 MG tablet tablet  Commonly known as: ELIQUIS   2.5 mg, Oral, 2 Times Daily      atorvastatin 10 MG tablet  Commonly known as: LIPITOR   10 mg, Oral, Nightly      Breztri Aerosphere 160-9-4.8 MCG/ACT aerosol inhaler  Generic drug: Budeson-Glycopyrrol-Formoterol   2 puffs, Inhalation, 2 Times Daily       bumetanide 2 MG tablet  Commonly known as: BUMEX   2 mg, Oral, 2 Times Daily      calcium acetate 667 MG capsule capsule  Commonly known as: PHOS BINDER)   1,334 mg, Oral, 3 Times Daily      carvedilol 6.25 MG tablet  Commonly known as: COREG   6.25 mg, Oral, 2 Times Daily With Meals      fenofibrate 160 MG tablet   160 mg, Oral, Daily      ferrous sulfate 325 (65 FE) MG tablet   325 mg, Oral, Daily With Breakfast      gabapentin 300 MG capsule  Commonly known as: NEURONTIN   300 mg, Oral, Nightly      gabapentin 100 MG capsule  Commonly known as: NEURONTIN   100 mg, Oral, Every Morning      Insulin Degludec 200 UNIT/ML solution pen-injector pen injection  Commonly known as: TRESIBA FLEXTOUCH   60 Units, Subcutaneous, Daily      levothyroxine 125 MCG tablet  Commonly known as: SYNTHROID, LEVOTHROID   125 mcg, Oral, Daily      loratadine 10 MG tablet  Commonly known as: CLARITIN   10 mg, Oral, Daily      metoclopramide 5 MG tablet  Commonly known as: REGLAN   5 mg, Oral, 2 Times Daily      midodrine 5 MG tablet  Commonly known as: PROAMATINE   5 mg, Oral, 3 Times Daily Before Meals, TAKE 1 TABLET BY MOUTH THREE TIMES DAILY AS DIRECTED SBP <100      mupirocin 2 % ointment  Commonly known as: BACTROBAN   1 Application, Topical, 3 Times Daily      pantoprazole 40 MG EC tablet  Commonly known as: PROTONIX   40 mg, Oral, Daily      primidone 50 MG tablet  Commonly known as: MYSOLINE   50 mg, Oral, 3 Times Weekly, M-W-F after dialysis for essential tremors      SITagliptin 50 MG tablet  Commonly known as: JANUVIA   50 mg, Oral, Daily      sodium zirconium cyclosilicate 10 g packet  Commonly known as: LOKELMA   10 g, Oral, Daily      vitamin D 1.25 MG (19365 UT) capsule capsule  Commonly known as: ERGOCALCIFEROL   50,000 Units, Oral, Weekly, Saturday             Stop These Medications      hydrocortisone 2.5 % cream              Allergies   Allergen Reactions   • Ketamine Anaphylaxis       Discharge  Disposition:  Home-Health Care [facility]    Diet:  Hospital:  Diet Order   Procedures   • Diet: Renal; Low Sodium (2-3g), Low Potassium, Low Phosphorus; Fluid Consistency: Thin (IDDSI 0)       Discharge Activity:       CODE STATUS:  Code Status and Medical Interventions: CPR (Attempt to Resuscitate); Full Support   Ordered at: 09/02/24 2101     Level Of Support Discussed With:    Patient     Code Status (Patient has no pulse and is not breathing):    CPR (Attempt to Resuscitate)     Medical Interventions (Patient has pulse or is breathing):    Full Support       No future appointments.    Additional Instructions for the Follow-ups that You Need to Schedule       Discharge Follow-up with PCP   As directed       Currently Documented PCP:    Yessica Sr APRN    PCP Phone Number:    [phone]     Follow Up Details: In 3-7 days; needs CBC and chemistries trended during follow up.        Discharge Follow-up with Specified Provider: Nephrology   As directed      To: Nephrology   Follow Up Details: As previously scheduled.                Pertinent  and/or Most Recent Results     PROCEDURES:       LAB RESULTS:      Lab 09/10/24  0510 09/09/24  0508 09/08/24  0514 09/07/24  0510 09/06/24  0510 09/05/24  0506 09/04/24  2313   WBC 9.92 10.91* 10.59 10.09 9.20   < >  --    HEMOGLOBIN 10.2* 10.4* 11.2* 10.9* 10.8*   < >  --    HEMATOCRIT 31.8* 32.2* 34.7* 33.9* 33.5*   < >  --    PLATELETS 254 262 262 254 232   < >  --    NEUTROS ABS 5.10 5.47 5.39 4.93 4.85   < >  --    IMMATURE GRANS (ABS) 0.06* 0.06* 0.05 0.04 0.07*   < >  --    LYMPHS ABS 2.95 3.67* 3.77* 3.69* 3.33*   < >  --    MONOS ABS 1.37* 1.22* 1.06* 1.23* 0.89   < >  --    EOS ABS 0.38 0.41* 0.25 0.15 0.02   < >  --    MCV 93.3 93.3 94.0 94.2 94.4   < >  --    LACTATE  --   --   --   --   --   --  1.5    < > = values in this interval not displayed.         Lab 09/10/24  0510 09/09/24  0508 09/08/24  0514 09/07/24  0510 09/06/24  0510   SODIUM 135* 133* 138  139 136   POTASSIUM 3.7 3.8 3.7 3.7 4.5   CHLORIDE 98 94* 97* 98 97*   CO2 22.9 23.9 24.7 29.6* 24.1   ANION GAP 14.1 15.1* 16.3* 11.4 14.9   BUN 25* 51* 43* 36* 65*   CREATININE 4.72* 7.10* 6.27* 5.31* 7.91*   EGFR 12.1* 7.4* 8.6* 10.5* 6.5*   GLUCOSE 94 149* 81 84 100*   CALCIUM 7.8* 7.5* 7.6* 8.1* 8.1*   MAGNESIUM 1.4* 1.6 1.6 1.6 1.7   PHOSPHORUS 4.4 6.9* 6.1* 5.0* 8.7*         Lab 09/10/24  0510 09/09/24  0508 09/08/24  0514 09/07/24  0510 09/06/24  0510   TOTAL PROTEIN 6.2 6.1 6.3 6.6 6.6   ALBUMIN 2.7* 2.8* 2.7* 2.9* 2.8*   ALT (SGPT) 9 9 8 8 6   AST (SGOT) 28 27 28 29 25   BILIRUBIN 0.6 0.5 0.5 0.5 0.5   INDIRECT BILIRUBIN 0.3 0.2 0.2 0.2 0.2   BILIRUBIN DIRECT 0.3 0.3 0.3 0.3 0.3   ALK PHOS 76 84 70 73 65                     Lab 09/06/24  1107 09/04/24  2313 09/04/24  1746   PH, ARTERIAL  --  7.409 7.367   PCO2, ARTERIAL  --  41.3 49.1*   PO2 ART  --  83.7 92.4   O2 SATURATION ART  --  96.3 96.8   FIO2 21 28  --    HCO3 ART  --  26.1* 28.2*   BASE EXCESS ART  --  1.3 2.1*     Brief Urine Lab Results       None          Microbiology Results (last 10 days)       Procedure Component Value - Date/Time    S. Pneumo Ag Urine or CSF - Urine, Urine, Clean Catch [[id number]]  (Normal) Collected: 09/03/24 1207    Lab Status: Final result Specimen: Urine, Clean Catch Updated: 09/03/24 1302     Strep Pneumo Ag Negative    Legionella Antigen, Urine - Urine, Urine, Clean Catch [[id number]]  (Normal) Collected: 09/03/24 1207    Lab Status: Final result Specimen: Urine, Clean Catch Updated: 09/03/24 1302     LEGIONELLA ANTIGEN, URINE Negative    Mycoplasma Pneumoniae Antibody, IgM - Blood, Arm, Left [[id number]]  (Abnormal) Collected: 09/03/24 0806    Lab Status: Final result Specimen: Blood from Arm, Left Updated: 09/03/24 0914     Mycoplasma pneumo IgM Positive            CT Head Without Contrast    Result Date: 9/7/2024  Impression: No acute intracranial abnormalities are identified. Electronically Signed: Matthew David MD   9/7/2024 9:50 AM EDT  Workstation ID: [id number]    CT Chest Without Contrast Diagnostic    Result Date: 9/3/2024  Impression: 1. Findings suggest congestive heart failure with small pleural effusions. Mild airspace consolidation in the lower lung fields is likely atelectasis. Superimposed pneumonia is also possible. 2. Cholelithiasis. 3. Mild stranding around the pancreas. Suggest correlation with any history of acute pancreatitis. 4. Hypodense right renal masses measuring up to 2 cm possibly representing cysts. They're indeterminate without contrast. 5. 4 cm duodenal diverticulum. Electronically Signed: Matthew David MD  9/3/2024 11:06 AM EDT  Workstation ID: [id number]    XR Chest 1 View    Result Date: 9/2/2024  Stable cardiac enlargement with chronic changes in the lung bases. Electronically Signed: Lam Reed MD  9/2/2024 9:34 PM EDT  Workstation ID: [id number]                  Labs Pending at Discharge:        Time spent on Discharge including face to face service:  35 minutes    Electronically signed by Gil Islas MD, 09/10/24, 1:31 PM EDT.

## 2024-09-10 NOTE — SIGNIFICANT NOTE
Wound Eval / Progress Noted    [facility]     Patient Name: Pedro Tilley  : 1945  MRN: [id number]  Today's Date: 9/10/2024                 Admit Date: 2024    Visit Dx:    ICD-10-CM ICD-9-CM   1. Impaired mobility and ADLs  Z74.09 V49.89    Z78.9    2. Difficulty walking  R26.2 719.7         COPD with acute exacerbation    Hyperkalemia    Acute on chronic respiratory failure    ESRD (end stage renal disease)    HTN (hypertension)    DM2 (diabetes mellitus, type 2)    CAD (coronary artery disease)        Past Medical History:   Diagnosis Date    Arthritis     Cancer     Coronary artery disease     Diabetes mellitus     Dialysis patient     History of transfusion     Hypertension     Kidney disease         History reviewed. No pertinent surgical history.      Physical Assessment:  Wound 24 Right upper arm Other (comment) (Active)   Wound Image   09/10/24 1020   Dressing Appearance open to air 09/10/24 1020   Closure Glue 09/10/24 1020   Base dry;scab 09/10/24 1020   Periwound intact;dry;redness 09/10/24 1020   Periwound Temperature warm 09/10/24 1020   Periwound Skin Turgor soft 09/10/24 1020   Edges rolled/closed 09/10/24 1020   Drainage Amount none 09/10/24 1020   Care, Wound cleansed with;sterile normal saline 09/10/24 1020   Dressing Care open to air 09/10/24 1020   Periwound Care dry periwound area maintained 24 2100       Wound 24 2136 Right lower arm Skin Tear (Active)   Wound Image   09/10/24 1020   Dressing Appearance intact;dry 09/10/24 1020   Closure None 09/10/24 1020   Base red;moist 09/10/24 1020   Periwound intact;dry;ecchymotic 09/10/24 1020   Periwound Temperature warm 09/10/24 1020   Periwound Skin Turgor soft 09/10/24 1020   Edges open;rolled/closed 09/10/24 1020   Drainage Characteristics/Odor serosanguineous 09/10/24 1020   Drainage Amount scant 09/10/24 1020   Care, Wound cleansed with;sterile normal saline 09/10/24 1020   Dressing Care dressing  applied;non-adherent;petroleum-based;gauze;silicone;border dressing 09/10/24 1020   Periwound Care absorptive dressing applied 09/10/24 1020       Wound 09/03/24 1120 Right upper arm Skin Tear (Active)   Wound Image   09/10/24 1020   Dressing Appearance open to air 09/10/24 1020   Closure None 09/10/24 1020   Base pink;dry 09/10/24 1020   Periwound intact;dry;ecchymotic 09/10/24 1020   Periwound Temperature warm 09/10/24 1020   Periwound Skin Turgor soft 09/10/24 1020   Edges open 09/10/24 1020   Drainage Amount none 09/10/24 1020   Care, Wound cleansed with;sterile normal saline 09/10/24 1020   Dressing Care dressing applied;non-adherent;petroleum-based;gauze;silicone;border dressing 09/10/24 1020   Periwound Care absorptive dressing applied 09/10/24 1020        Wound Check / Follow-up: Patient seen today for a wound check and dressing change.  Patient is awake alert and oriented sitting on edge of bed.  Patient is agreeable to wound check at this time.    Patient with skin tears to the posterior right upper arm as well as right lateral arm with some improvement.  Skin flaps remain in place small amount of wound base is present.  Periwound does have ecchymotic tissue along with dry skin.  Cleansed all areas with normal saline.  Recommended continue current wound care orders with nonadherent petroleum based gauze to the wound bases.    Right upper arm incision has crusting to the medial aspect of the incision; no evidence of dehiscence or drainage present at this time.  Periwound does have erythema present and tissue is soft.  Cleanse with normal saline.  Recommending application of mupirocin 3 times daily and leave open to air discussed that plan of care with attending physician, attending physician agreeable to current plan of care.    Buttocks pink dry and intact no evidence of skin breakdown present at this time. Continue with every 2 hours turns and offload at all times.  Keep patient clean and free from all  moisture.    Impression: Right upper arm incision reddened with crusting tissue, skin tears regain skin integrity, skin protection, moisture prevention, quality skin care and hygiene    Short term goals:, Every other day dressing change, pressure reduction.    Rhianna Moore RN    9/10/2024    12:21 EDT

## 2024-09-10 NOTE — PLAN OF CARE
Goal Outcome Evaluation:Pt did not wear BIPAP tonight. Wife states he takes a good nap after dialysis and then is awake all night. Pt awake sitting on the side of the bed. He is on RA.

## 2024-09-10 NOTE — SIGNIFICANT NOTE
09/10/24 0846   OTHER   Discipline occupational therapist   Rehab Time/Intention   Session Not Performed patient unavailable for treatment  (with staff)

## 2024-09-10 NOTE — PLAN OF CARE
Goal Outcome Evaluation:  Plan of Care Reviewed With: patient, spouse        Progress: improving  Outcome Evaluation: Patient alert and oriented throughout shift. No complaints of pain or discomfort noted throughout shift. Spouse present at bedside throughout shift. IV electrolyte replacement administered per MAR. Plan of care discussed with patient and spouse at bedside. Patient to discharge this afternoon.

## 2024-09-10 NOTE — PLAN OF CARE
Goal Outcome Evaluation:  Plan of Care Reviewed With: patient        Progress: no change  Outcome Evaluation: Patient did not wear bipap last night; seteings are 12/5 and 24%; he is currently on room air and on a continuous pulse oximeter

## 2024-09-10 NOTE — PROGRESS NOTES
RT EQUIPMENT DEVICE RELATED - SKIN ASSESSMENT    RT Medical Equipment/Device:     NIV Mask:  Under-the-nose   size:       Skin Assessment:      Cheek:  Intact  Chin:  Intact  Neck:  Intact  Nose:  Intact    Device Skin Pressure Protection:  Pressure points protected    Nurse Notification:  Ernestina Carter, RRT

## 2024-09-10 NOTE — THERAPY EVALUATION
Acute Care - Physical Therapy Initial Evaluation   [place]     Patient Name: Pedro Tilley  : 1945  MRN: [id number]  Today's Date: 9/10/2024      Visit Dx:     ICD-10-CM ICD-9-CM   1. Impaired mobility and ADLs  Z74.09 V49.89    Z78.9    2. Difficulty walking  R26.2 719.7     Patient Active Problem List   Diagnosis    Hyperkalemia    Acute on chronic respiratory failure    COPD with acute exacerbation    ESRD (end stage renal disease)    HTN (hypertension)    DM2 (diabetes mellitus, type 2)    CAD (coronary artery disease)     Past Medical History:   Diagnosis Date    Arthritis     Cancer     Coronary artery disease     Diabetes mellitus     Dialysis patient     History of transfusion     Hypertension     Kidney disease      History reviewed. No pertinent surgical history.  PT Assessment (Last 12 Hours)       PT Evaluation and Treatment       Row Name 09/10/24 1150          Physical Therapy Time and Intention    Subjective Information no complaints  -DP     Document Type evaluation  -DP     Mode of Treatment individual therapy;physical therapy  -DP     Patient Effort good  -DP       Row Name 09/10/24 1150          General Information    Patient Profile Reviewed yes  -DP     Patient Observations alert;cooperative  -DP     Prior Level of Function independent:;gait;transfer;bed mobility;ADL's  -DP     Equipment Currently Used at Home none  but had a RW, WC and a STC to use if needed.  -DP     Existing Precautions/Restrictions fall  -DP     Barriers to Rehab none identified  -DP       Row Name 09/10/24 1150          Living Environment    Current Living Arrangements home  -DP     People in Home spouse  -DP       Row Name 09/10/24 1150          Cognition    Orientation Status (Cognition) oriented x 3  -DP       Row Name 09/10/24 1150          Range of Motion (ROM)    Range of Motion bilateral lower extremities;ROM is WFL  -DP       Row Name 09/10/24 1150          Strength Comprehensive (MMT)    General Manual  Muscle Testing (MMT) Assessment lower extremity strength deficits identified  -DP     Comment, General Manual Muscle Testing (MMT) Assessment BLE: 4/5  -DP       Row Name 09/10/24 1150          Bed Mobility    Bed Mobility supine-sit-supine  -DP     Supine-Sit-Supine Patriot (Bed Mobility) standby assist  Pt was sitting o nthe edge of the bed upon arrival  -DP       Row Name 09/10/24 1150          Transfers    Transfers sit-stand transfer  -DP       Row Name 09/10/24 1150          Sit-Stand Transfer    Sit-Stand Patriot (Transfers) contact guard  -DP       Row Name 09/10/24 1150          Gait/Stairs (Locomotion)    Gait/Stairs Locomotion gait/ambulation assistive device  -DP     Patriot Level (Gait) contact guard  -DP     Assistive Device (Gait) walker, front-wheeled  -DP     Patient was able to Ambulate yes  -DP     Distance in Feet (Gait) 20  -DP       Row Name 09/10/24 1150          Balance    Balance Assessment standing dynamic balance  -DP     Dynamic Standing Balance contact guard  -DP     Position/Device Used, Standing Balance supported;walker, front-wheeled  -DP       Row Name             Wound 09/02/24 2106 Right upper chest Other (comment)    Wound - Properties Group Placement Date: 09/02/24  -LJ Placement Time: 2106 -LJ Present on Original Admission: Y  -LJ Side: Right  -LJ Orientation: upper  -LJ Location: chest  -LJ Primary Wound Type: Other  -LJ, tunnel catheter     Retired Wound - Properties Group Placement Date: 09/02/24  -LJ Placement Time: 2106 -LJ Present on Original Admission: Y  -LJ Side: Right  -LJ Orientation: upper  -LJ Location: chest  -LJ Primary Wound Type: Other  -LJ, tunnel catheter     Retired Wound - Properties Group Date first assessed: 09/02/24  -LJ Time first assessed: 2106 -LJ Present on Original Admission: Y  -LJ Side: Right  -LJ Location: chest  -LJ Primary Wound Type: Other  -LJ, tunnel catheter       Row Name             Wound 09/02/24 Right upper arm Other  (comment)    Wound - Properties Group Placement Date: 09/02/24  -LJ, unknown  Present on Original Admission: Y  -LJ Side: Right  -LJ Orientation: upper  -LJ Location: arm  -LJ Primary Wound Type: Other  -LJ, diaylsis fistula placed     Retired Wound - Properties Group Placement Date: 09/02/24  -LJ, unknown  Present on Original Admission: Y  -LJ Side: Right  -LJ Orientation: upper  -LJ Location: arm  -LJ Primary Wound Type: Other  -LJ, diaylsis fistula placed     Retired Wound - Properties Group Date first assessed: 09/02/24  -LJ, unknown  Present on Original Admission: Y  -LJ Side: Right  -LJ Location: arm  -LJ Primary Wound Type: Other  -LJ, diaylsis fistula placed       Row Name             Wound 09/02/24 2136 Right lower arm Skin Tear    Wound - Properties Group Placement Date: 09/02/24  -LJ Placement Time: 2136 -LJ Present on Original Admission: Y  -LJ Side: Right  -LJ Orientation: lower  -LJ Location: arm  -LJ Primary Wound Type: Skin tear  -LJ    Retired Wound - Properties Group Placement Date: 09/02/24  -LJ Placement Time: 2136 -LJ Present on Original Admission: Y  -LJ Side: Right  -LJ Orientation: lower  -LJ Location: arm  -LJ Primary Wound Type: Skin tear  -LJ    Retired Wound - Properties Group Date first assessed: 09/02/24 -LJ Time first assessed: 2136 -LJ Present on Original Admission: Y  -LJ Side: Right  -LJ Location: arm  -LJ Primary Wound Type: Skin tear  -LJ      Row Name             Wound 09/03/24 1120 Right upper arm Skin Tear    Wound - Properties Group Placement Date: 09/03/24  -KE Placement Time: 1120  -KE Side: Right  -KE Orientation: upper  -KE Location: arm  -KE Primary Wound Type: Skin tear  -KE    Retired Wound - Properties Group Placement Date: 09/03/24  -KE Placement Time: 1120  -KE Side: Right  -KE Orientation: upper  -KE Location: arm  -KE Primary Wound Type: Skin tear  -KE    Retired Wound - Properties Group Date first assessed: 09/03/24  -KE Time first assessed: 1120  -KE Side:  Right  -KE Location: arm  -KE Primary Wound Type: Skin tear  -KE      Row Name 09/10/24 1150          Plan of Care Review    Plan of Care Reviewed With patient  -DP     Outcome Evaluation Pt presents with decreased strength, transfers and functional mobility. Will benefit from inpatient PT services and continued PT services upon discharge.  -DP       Row Name 09/10/24 1150          Therapy Assessment/Plan (PT)    Rehab Potential (PT) good, to achieve stated therapy goals  -DP     Criteria for Skilled Interventions Met (PT) yes;meets criteria  -DP     Therapy Frequency (PT) daily  -DP     Predicted Duration of Therapy Intervention (PT) 10 days  -DP     Problem List (PT) problems related to;mobility  -DP     Activity Limitations Related to Problem List (PT) unable to transfer safely;unable to ambulate safely  -DP       Row Name 09/10/24 1150          PT Evaluation Complexity    History, PT Evaluation Complexity no personal factors and/or comorbidities  -DP     Examination of Body Systems (PT Eval Complexity) total of 4 or more elements  -DP     Clinical Presentation (PT Evaluation Complexity) stable  -DP     Clinical Decision Making (PT Evaluation Complexity) low complexity  -DP     Overall Complexity (PT Evaluation Complexity) low complexity  -DP       Row Name 09/10/24 1150          Physical Therapy Goals    Transfer Goal Selection (PT) transfer, PT goal 1  -DP     Gait Training Goal Selection (PT) gait training, PT goal 1  -DP       Row Name 09/10/24 1150          Transfer Goal 1 (PT)    Activity/Assistive Device (Transfer Goal 1, PT) sit-to-stand/stand-to-sit  -DP     [place] Level/Cues Needed (Transfer Goal 1, PT) supervision required  -DP     Time Frame (Transfer Goal 1, PT) 10 days  -DP       Row Name 09/10/24 1150          Gait Training Goal 1 (PT)    Activity/Assistive Device (Gait Training Goal 1, PT) assistive device use;walker, rolling  -DP     [place] Level (Gait Training Goal 1, PT) supervision  required  -DP     Distance (Gait Training Goal 1, PT) 200  -DP     Time Frame (Gait Training Goal 1, PT) 10 days  -DP               User Key  (r) = Recorded By, (t) = Taken By, (c) = Cosigned By      Initials Name Provider Type    Jenny Chance, RN Registered Nurse    Gurmeet Michael PT Physical Therapist    Danyell Pulido RN Registered Nurse                      PT Recommendation and Plan  Anticipated Discharge Disposition (PT): home with assist, home with home health  Planned Therapy Interventions (PT): balance training, bed mobility training, gait training, strengthening, transfer training  Therapy Frequency (PT): daily  Plan of Care Reviewed With: patient  Outcome Evaluation: Pt presents with decreased strength, transfers and functional mobility. Will benefit from inpatient PT services and continued PT services upon discharge.   Outcome Measures       Row Name 09/10/24 1100             How much help from another person do you currently need...    Turning from your back to your side while in flat bed without using bedrails? 4  -DP      Moving from lying on back to sitting on the side of a flat bed without bedrails? 3  -DP      Moving to and from a bed to a chair (including a wheelchair)? 3  -DP      Standing up from a chair using your arms (e.g., wheelchair, bedside chair)? 3  -DP      Climbing 3-5 steps with a railing? 3  -DP      To walk in hospital room? 3  -DP      AM-PAC 6 Clicks Score (PT) 19  -DP      Highest Level of Mobility Goal 6 --> Walk 10 steps or more  -DP         Functional Assessment    Outcome Measure Options AM-PAC 6 Clicks Basic Mobility (PT)  -DP                User Key  (r) = Recorded By, (t) = Taken By, (c) = Cosigned By      Initials Name Provider Type    Gurmeet Michael, PT Physical Therapist                     Time Calculation:    PT Charges       Row Name 09/10/24 1157             Time Calculation    PT Received On 09/10/24  -DP      PT Goal Re-Cert Due Date 09/19/24  -DP          Untimed Charges    PT Eval/Re-eval Minutes 40  -DP         Total Minutes    Untimed Charges Total Minutes 40  -DP       Total Minutes 40  -DP                User Key  (r) = Recorded By, (t) = Taken By, (c) = Cosigned By      Initials Name Provider Type    Gurmeet Michael, PT Physical Therapist                      PT G-Codes  Outcome Measure Options: AM-PAC 6 Clicks Basic Mobility (PT)  AM-PAC 6 Clicks Score (PT): 19  AM-PAC 6 Clicks Score (OT): 17    Gurmeet Murguia PT  9/10/2024

## 2024-09-10 NOTE — DISCHARGE INSTR - DIET
Diet Orders:   Diet: Renal; Low Sodium (2-3g), Low Potassium, Low Phosphorus; Fluid Consistency: Thin (IDDSI 0)

## 2024-09-10 NOTE — PLAN OF CARE
Goal Outcome Evaluation:  Plan of Care Reviewed With: patient           Outcome Evaluation: Pt presents with decreased strength, transfers and functional mobility. Will benefit from inpatient PT services and continued PT services upon discharge.      Anticipated Discharge Disposition (PT): home with assist, home with home health

## 2024-09-10 NOTE — PLAN OF CARE
Goal Outcome Evaluation:         A/O, Pt on room air. Pt has been up to BSC with 1 assist. Family stated he was back to self cutting up making jokes. Pt did nap after diaylsis and spent few hours sitting up on side of bed during the night. Pt is eager to get back home. Will continue plan of care.

## 2024-09-11 NOTE — OUTREACH NOTE
Prep Survey      Flowsheet Row Responses   [facility] from? [facility]   Is LACE score < 7 ? No   Eligibility Readm Mgmt   Discharge diagnosis COPD with acute exacerbation, ESRD on HD   Does the patient have one of the following disease processes/diagnoses(primary or secondary)? COPD   Does the patient have Home health ordered? Yes   What is the Home health agency?  [facility]   Is there a DME ordered? No   General alerts for this patient HD MWF   Prep survey completed? Yes            Ginette LÓPEZ - Registered Nurse

## 2024-09-13 ENCOUNTER — READMISSION MANAGEMENT (OUTPATIENT)
Dept: CALL CENTER | Facility: HOSPITAL | Age: 79
End: 2024-09-13
Payer: MEDICARE

## 2024-09-13 NOTE — OUTREACH NOTE
COPD/PN Week 1 Survey      Flowsheet Row Responses   [facility] facility patient discharged from? [facility]   Does the patient have one of the following disease processes/diagnoses(primary or secondary)? COPD   Week 1 attempt successful? No   Unsuccessful attempts Attempt 1            Arlen ARMAS - Registered Nurse

## 2024-09-16 ENCOUNTER — READMISSION MANAGEMENT (OUTPATIENT)
Dept: CALL CENTER | Facility: HOSPITAL | Age: 79
End: 2024-09-16
Payer: MEDICARE

## 2024-10-02 ENCOUNTER — READMISSION MANAGEMENT (OUTPATIENT)
Dept: CALL CENTER | Facility: HOSPITAL | Age: 79
End: 2024-10-02
Payer: MEDICARE

## 2024-10-02 NOTE — OUTREACH NOTE
"COPD/PN Week 3 Survey      Flowsheet Row Responses   [facility] patient discharged from? [facility]   Does the patient have one of the following disease processes/diagnoses(primary or secondary)? COPD   Week 3 attempt successful? Yes   Call start time 0925   Call end time 0928   General alerts for this patient HD MWF   Discharge diagnosis COPD with acute exacerbation, ESRD on HD   Is the patient taking all medications as directed (includes completed medication regime)? Yes   Does the patient have a primary care provider?  Yes   Has the patient kept scheduled appointments due by today? Yes   What is the Home health agency?  [facility]    Has home health visited the patient within 72 hours of discharge? Yes   Pulse Ox monitoring Intermittent   Pulse Ox device source Patient   O2 Sat comments % room air   O2 Sat: education provided Sat levels, Monitoring frequency, When to seek care   Psychosocial issues? No   What is the patient's perception of their health status since discharge? Improving   Patient reports what zone on this call? Green Zone   Green Zone Reports doing well, Appetite is good   Green Zone interventions: Take daily medications   Is the patient/caregiver able to teach back signs and symptoms of worsening condition: Fever/chills, Chest pain, Shortness of breath   Week 3 call completed? Yes   Wrap up additional comments \"I'm surviving\".  Denies questions or needs at this time.   Call end time 0928            Alexandra FITZPATRICK - Licensed Nurse  "

## 2024-10-28 PROBLEM — R65.21 SEPTIC SHOCK: Status: ACTIVE | Noted: 2024-01-01

## 2024-10-28 PROBLEM — A41.9 SEPTIC SHOCK: Status: ACTIVE | Noted: 2024-01-01

## 2024-10-28 NOTE — SIGNIFICANT NOTE
Patient is a 78-year-old male with history of CHF status post AICD and ESRD on HD on 06/2024 [facility] who presented to [facility] as he was found to be hypotensive with blood pressure 70/30 at dialysis.  Given his low blood pressure, dialysis was held and patient was sent to ER for further evaluation and management.  At ER, he complained of generalized weakness and looked pale.  On examination he had bilateral crackles.  There was concern for cardiogenic shock for which she was started on dobutamine.  Later lactic acid was normal.  WBC normal with hemoglobin 11.9.  Procalcitonin 0.8.  Limited echo was obtained which showed moderate pericardial effusion without tamponade.  Cardiology on-call at [facility] was contacted; given lactic acid is normal low concern for cardiogenic shock.  Advised to switch to Levophed.  Hospitalist service was contacted.  Blood pressure 91/57 later.  Started on IV antibiotics given his bilateral crackles.  Cardiology agreed to consult the patient.  Advised to obtain CT chest prior to transfer.  Patient has been accepted at [facility] for further evaluation and management of hypotension less likely cardiogenic shock.

## 2024-10-29 NOTE — CONSULTS
Patient Care Team:  Yessica Sr APRN as PCP - General (Family Medicine)    Chief complaint: ESRD    Consults   Subjective     History of Present Illness  77yo with h/o ESRD on dialysis m/w/f at [facility] in [place].  He had presented to clinic with hypotension with systolic in 50-60mmHg and weakness/confusion and sent to ER.  He had noted lactic acidosis and sent to [facility].  We were asked to see him for dialysis needs.      Review of Systems   Constitutional:  Positive for chills and fever.   Respiratory:  Positive for cough and shortness of breath.    Cardiovascular:  Positive for leg swelling. Negative for chest pain and palpitations.   Gastrointestinal:  Negative for diarrhea and nausea.   Neurological:  Positive for dizziness.   Psychiatric/Behavioral:  Positive for confusion.         Past Medical History:   Diagnosis Date    Arthritis     Cancer     Coronary artery disease     Diabetes mellitus     Dialysis patient     History of transfusion     Hypertension     Kidney disease    , History reviewed. No pertinent surgical history., History reviewed. No pertinent family history.,   Social History     Socioeconomic History    Marital status:    Tobacco Use    Smoking status: Every Day     Current packs/day: 1.00     Average packs/day: 1 pack/day for 65.2 years (65.2 ttl pk-yrs)     Types: Cigarettes     Start date: 9/2/1959   Vaping Use    Vaping status: Never Used   Substance and Sexual Activity    Alcohol use: Never    Drug use: Never    Sexual activity: Defer     E-cigarette/Vaping    E-cigarette/Vaping Use Never User     Passive Exposure No     Counseling Given No      E-cigarette/Vaping Substances    Nicotine No     THC No     Flavoring No      E-cigarette/Vaping Devices    Disposable No     Pre-filled or Refillable Cartridge No     Refillable Tank No     Pre-filled Pod No          ,   Medications Prior to Admission   Medication Sig Dispense Refill Last Dose/Taking    allopurinol  (ZYLOPRIM) 100 MG tablet Take 1 tablet by mouth Daily.       apixaban (ELIQUIS) 2.5 MG tablet tablet Take 1 tablet by mouth 2 (Two) Times a Day.       atorvastatin (LIPITOR) 10 MG tablet Take 1 tablet by mouth Every Night.       Budeson-Glycopyrrol-Formoterol (Breztri Aerosphere) 160-9-4.8 MCG/ACT aerosol inhaler Inhale 2 puffs 2 (Two) Times a Day.       bumetanide (BUMEX) 2 MG tablet Take 1 tablet by mouth 2 (Two) Times a Day.       calcium acetate (PHOS BINDER,) 667 MG capsule capsule Take 2 capsules by mouth 3 (Three) Times a Day.       carvedilol (COREG) 6.25 MG tablet Take 1 tablet by mouth 2 (Two) Times a Day With Meals.       fenofibrate 160 MG tablet Take 1 tablet by mouth Daily.       ferrous sulfate 325 (65 FE) MG tablet Take 1 tablet by mouth Daily With Breakfast.       gabapentin (NEURONTIN) 100 MG capsule Take 1 capsule by mouth Every Morning.       gabapentin (NEURONTIN) 300 MG capsule Take 1 capsule by mouth Every Night.       Insulin Degludec (TRESIBA FLEXTOUCH) 200 UNIT/ML solution pen-injector pen injection Inject 60 Units under the skin into the appropriate area as directed Daily.       levothyroxine (SYNTHROID, LEVOTHROID) 125 MCG tablet Take 1 tablet by mouth Daily.       loratadine (CLARITIN) 10 MG tablet Take 1 tablet by mouth Daily.       metoclopramide (REGLAN) 5 MG tablet Take 1 tablet by mouth 2 (Two) Times a Day.       midodrine (PROAMATINE) 5 MG tablet Take 1 tablet by mouth 3 (Three) Times a Day Before Meals. TAKE 1 TABLET BY MOUTH THREE TIMES DAILY AS DIRECTED SBP <100       mupirocin (BACTROBAN) 2 % ointment Apply 1 Application topically to the appropriate area as directed 3 (Three) Times a Day.       pantoprazole (PROTONIX) 40 MG EC tablet Take 1 tablet by mouth Daily.       primidone (MYSOLINE) 50 MG tablet Take 1 tablet by mouth 3 (Three) Times a Week. M-W-F after dialysis for essential tremors       SITagliptin (JANUVIA) 50 MG tablet Take 1 tablet by mouth Daily.       sodium  zirconium cyclosilicate (LOKELMA) 10 g packet Take 10 g by mouth Daily.       vitamin D (ERGOCALCIFEROL) 1.25 MG (44382 UT) capsule capsule Take 1 capsule by mouth 1 (One) Time Per Week. Saturday      , Scheduled Meds:  arformoterol, 15 mcg, Nebulization, BID - RT  budesonide, 0.5 mg, Nebulization, BID - RT  cefepime, 2,000 mg, Intravenous, Q24H  insulin lispro, 2-9 Units, Subcutaneous, 4x Daily AC & at Bedtime  ipratropium-albuterol, 3 mL, Nebulization, 4x Daily - RT  levothyroxine, 125 mcg, Oral, Q AM  midodrine, 5 mg, Oral, TID AC  mupirocin, 1 Application, Each Nare, BID  senna-docusate sodium, 2 tablet, Oral, BID  sodium chloride, 10 mL, Intravenous, Q12H   , Continuous Infusions:  norepinephrine, 0.02-0.3 mcg/kg/min, Last Rate: 0.12 mcg/kg/min (10/29/24 0707)  Pharmacy to Dose Cefepime,   Pharmacy to dose vancomycin,    , PRN Meds:    acetaminophen **OR** acetaminophen    senna-docusate sodium **AND** polyethylene glycol **AND** bisacodyl **AND** bisacodyl    dextrose    dextrose    glucagon (human recombinant)    ipratropium-albuterol    nitroglycerin    ondansetron ODT **OR** ondansetron    Pharmacy to Dose Cefepime    Pharmacy to dose vancomycin    sodium chloride    sodium chloride, and Allergies:  Ketamine    Objective     Vital Signs  Temp:  [98 °F (36.7 °C)-98.7 °F (37.1 °C)] 98.7 °F (37.1 °C)  Heart Rate:  [75-85] 75  Resp:  [20] 20  BP: ()/(35-94) 82/45    No intake/output data recorded.  I/O last 3 completed shifts:  In: 896 [I.V.:896]  Out: -     Physical Exam  HENT:      Head: Normocephalic.      Nose: Nose normal.      Mouth/Throat:      Mouth: Mucous membranes are moist.   Eyes:      General: No scleral icterus.     Pupils: Pupils are equal, round, and reactive to light.   Cardiovascular:      Rate and Rhythm: Normal rate and regular rhythm.   Pulmonary:      Effort: Pulmonary effort is normal.      Breath sounds: Rhonchi present.   Abdominal:      General: Abdomen is flat.      Palpations:  "Abdomen is soft.   Musculoskeletal:      Cervical back: Normal range of motion.      Right lower leg: No edema.      Left lower leg: No edema.   Skin:     General: Skin is warm and dry.   Neurological:      General: No focal deficit present.      Mental Status: He is alert.         Results Review:    I reviewed the patient's new clinical results.  I reviewed the patient's new imaging results and agree with the interpretation.  I reviewed the patient's other test results and agree with the interpretation    WBC WBC   Date Value Ref Range Status   10/29/2024 14.27 (H) 3.40 - 10.80 10*3/mm3 Final   10/28/2024 12.75 (H) 3.40 - 10.80 10*3/mm3 Final      HGB Hemoglobin   Date Value Ref Range Status   10/29/2024 11.0 (L) 13.0 - 17.7 g/dL Final   10/28/2024 10.9 (L) 13.0 - 17.7 g/dL Final      HCT Hematocrit   Date Value Ref Range Status   10/29/2024 34.5 (L) 37.5 - 51.0 % Final   10/28/2024 34.9 (L) 37.5 - 51.0 % Final      Platlets No results found for: \"LABPLAT\"   MCV MCV   Date Value Ref Range Status   10/29/2024 93.8 79.0 - 97.0 fL Final   10/28/2024 93.3 79.0 - 97.0 fL Final          Sodium Sodium   Date Value Ref Range Status   10/29/2024 133 (L) 136 - 145 mmol/L Final   10/28/2024 131 (L) 136 - 145 mmol/L Final      Potassium Potassium   Date Value Ref Range Status   10/29/2024 4.5 3.5 - 5.2 mmol/L Final   10/28/2024 4.2 3.5 - 5.2 mmol/L Final      Chloride Chloride   Date Value Ref Range Status   10/29/2024 93 (L) 98 - 107 mmol/L Final   10/28/2024 91 (L) 98 - 107 mmol/L Final      CO2 CO2   Date Value Ref Range Status   10/29/2024 20.2 (L) 22.0 - 29.0 mmol/L Final   10/28/2024 20.8 (L) 22.0 - 29.0 mmol/L Final      BUN BUN   Date Value Ref Range Status   10/29/2024 61 (H) 8 - 23 mg/dL Final   10/28/2024 59 (H) 8 - 23 mg/dL Final      Creatinine Creatinine   Date Value Ref Range Status   10/29/2024 10.09 (H) 0.76 - 1.27 mg/dL Final   10/28/2024 9.74 (H) 0.76 - 1.27 mg/dL Final      Calcium Calcium   Date Value Ref " "Range Status   10/29/2024 8.5 (L) 8.6 - 10.5 mg/dL Final   10/28/2024 8.4 (L) 8.6 - 10.5 mg/dL Final      PO4 No results found for: \"CAPO4\"   Albumin No results found for: \"ALBUMIN\"   Magnesium Magnesium   Date Value Ref Range Status   10/29/2024 2.2 1.6 - 2.4 mg/dL Final   10/28/2024 2.0 1.6 - 2.4 mg/dL Final      Uric Acid No results found for: \"URICACID\"         Assessment & Plan       Septic shock    Acute on chronic respiratory failure    COPD with acute exacerbation    ESRD (end stage renal disease)    DM2 (diabetes mellitus, type 2)    CAD (coronary artery disease)      Assessment & Plan  ESRD-  Normal dialysis m/w/f at [facility].  TDC access.  Will plan dialysis today, last dialysis was Friday.  BP improved on levophed, minimal UF of 1L for today.      PNA-  noted lung infiltrates on CXR.  Resp viral panel normal.  Sputum cx with gram + cocci in pairs and chains noted.  Increased procalcitonin also noted.    Sepsis-  likely due to pna.  On levophed now.    CHF-  volume overload but with hypotension limiting UF on dialysis.          I discussed the patients findings and my recommendations with patient, family, nursing staff, and primary care team    Nain Wetzel MD  10/29/24  08:51 EDT          "

## 2024-10-29 NOTE — PROGRESS NOTES
Respiratory Therapist Broncho-Pulmonary Hygiene Progress Note      Patient Name:  Pedro Tilley  YOB: 1945    Pedro Tilley meets the qualification for Level 3 of the Bronco-Pulmonary Hygiene Protocol. This was based on my daily patient assessment and includes review of chest x-ray results, cough ability and quality, oxygenation, secretions or risk for secretion development and patient mobility.     Broncho-Pulmonary Hygiene Assessment:    Level of Movement: Actively changing positions-requires assistance  Disoriented/Follows Commands    Breath Sounds: Bilateral localized decreased air exchange and/or coarse rhonchi    Cough: Ineffective, weak or not cough efforts    Chest X-Ray: Presence of atelectasis and/or consolidation    Sputum Productions: Thick, tenacious retained secretions with difffiulty clearing    History and Physical: Chronic condition    SpO2 to Oxygen Need: greater than 92% on room air or  less than 3L nasal canula    Current SpO2 is: 94% on 2L    Based on this information, I have completed the following interventions: CPT (precussor)      Electronically signed by Hailey Quinn RRT, 10/29/24, 8:39 AM EDT.

## 2024-10-29 NOTE — PLAN OF CARE
Goal Outcome Evaluation:  Plan of Care Reviewed With: patient, spouse        Progress: improving                   Pt assessment per flowsheet. Medications per MAR. No acute events Pt on levo to maintain MAP >65. No acute events. Family at bedside through night, updated on care. Labs reviewed. Plan of care ongoing. Report to Kathi WING.

## 2024-10-29 NOTE — H&P
Patient Care Team:  Yessica Sr APRN as PCP - General (Family Medicine)    Chief complaint transfer from [facility]     Patient is a 78 y.o. male presents as a transfer from [facility] after he was found to be hypotensive at outpatient dialysis.  He had a blood pressure of 70/30.  He sees Dr. Wetzel.  Patient did not receive any dialysis treatment.  In the ER there he looked unwell and was generally weak and pale.  He had bilateral crackles.  There was concern at first for cardiogenic shock and he was started on dobutamine.  He also had a lactic acidosis.  Limited echo showed a moderate pericardial effusion without tamponade.  Patient was started on Levophed.    Patient seen in the ICU and had audible crackles even without auscultation with a stethoscope.  He looked to have labored breathing.  He was placed on BiPAP.  Patient remains on Levophed.        Review of Systems   Pertinent items are noted in HPI    History  Past Medical History:   Diagnosis Date    Arthritis     Cancer     Coronary artery disease     Diabetes mellitus     Dialysis patient     History of transfusion     Hypertension     Kidney disease      No past surgical history on file.  No family history on file.  Social History     Tobacco Use    Smoking status: Every Day     Current packs/day: 1.00     Average packs/day: 1 pack/day for 65.2 years (65.2 ttl pk-yrs)     Types: Cigarettes     Start date: 9/2/1959   Vaping Use    Vaping status: Never Used   Substance Use Topics    Alcohol use: Never    Drug use: Never     Medications Prior to Admission   Medication Sig Dispense Refill Last Dose/Taking    allopurinol (ZYLOPRIM) 100 MG tablet Take 1 tablet by mouth Daily.       apixaban (ELIQUIS) 2.5 MG tablet tablet Take 1 tablet by mouth 2 (Two) Times a Day.       atorvastatin (LIPITOR) 10 MG tablet Take 1 tablet by mouth Every Night.       Budeson-Glycopyrrol-Formoterol (Breztri Aerosphere) 160-9-4.8  MCG/ACT aerosol inhaler Inhale 2 puffs 2 (Two) Times a Day.       bumetanide (BUMEX) 2 MG tablet Take 1 tablet by mouth 2 (Two) Times a Day.       calcium acetate (PHOS BINDER,) 667 MG capsule capsule Take 2 capsules by mouth 3 (Three) Times a Day.       carvedilol (COREG) 6.25 MG tablet Take 1 tablet by mouth 2 (Two) Times a Day With Meals.       fenofibrate 160 MG tablet Take 1 tablet by mouth Daily.       ferrous sulfate 325 (65 FE) MG tablet Take 1 tablet by mouth Daily With Breakfast.       gabapentin (NEURONTIN) 100 MG capsule Take 1 capsule by mouth Every Morning.       gabapentin (NEURONTIN) 300 MG capsule Take 1 capsule by mouth Every Night.       Insulin Degludec (TRESIBA FLEXTOUCH) 200 UNIT/ML solution pen-injector pen injection Inject 60 Units under the skin into the appropriate area as directed Daily.       levothyroxine (SYNTHROID, LEVOTHROID) 125 MCG tablet Take 1 tablet by mouth Daily.       loratadine (CLARITIN) 10 MG tablet Take 1 tablet by mouth Daily.       metoclopramide (REGLAN) 5 MG tablet Take 1 tablet by mouth 2 (Two) Times a Day.       midodrine (PROAMATINE) 5 MG tablet Take 1 tablet by mouth 3 (Three) Times a Day Before Meals. TAKE 1 TABLET BY MOUTH THREE TIMES DAILY AS DIRECTED SBP <100       mupirocin (BACTROBAN) 2 % ointment Apply 1 Application topically to the appropriate area as directed 3 (Three) Times a Day.       pantoprazole (PROTONIX) 40 MG EC tablet Take 1 tablet by mouth Daily.       primidone (MYSOLINE) 50 MG tablet Take 1 tablet by mouth 3 (Three) Times a Week. M-W-F after dialysis for essential tremors       SITagliptin (JANUVIA) 50 MG tablet Take 1 tablet by mouth Daily.       sodium zirconium cyclosilicate (LOKELMA) 10 g packet Take 10 g by mouth Daily.       vitamin D (ERGOCALCIFEROL) 1.25 MG (56164 UT) capsule capsule Take 1 capsule by mouth 1 (One) Time Per Week. Saturday        Allergies:  Ketamine    Objective     Vital Signs  Temp:  [98.1 °F (36.7 °C)-98.2 °F (36.8  °C)] 98.2 °F (36.8 °C)  Heart Rate:  [75-85] 75  Resp:  [20] 20  BP: ()/(48-94) 98/60    Physical Exam:      General Appearance:  Alert, cooperative, in no acute distress   Head:  Normocephalic, without obvious abnormality, atraumatic   Eyes:  Lids and lashes normal, conjunctivae and sclerae normal, no icterus, no pallor, corneas clear, PERRLA   Ears:  Ears appear intact with no abnormalities noted   Throat:  No oral lesions, no thrush, oral mucosa moist   Neck:  No adenopathy, supple, trachea midline, no thyromegaly, no carotid bruit, no JVD   Back:  No kyphosis present, no scoliosis present, no skin lesions, erythema or scars, no tenderness to percussion or palpation, range of motion normal   Lungs:  Clear to auscultation, respirations regular, even and unlabored    Heart:  Regular rhythm and normal rate, normal S1 and S2, no murmur, no gallop, no rub, no click   Chest Wall:  No abnormalities observed   Abdomen:  Normal bowel sounds, no masses, no organomegaly, soft non-tender, non-distended, no guarding, no rebound tenderness   Rectal:  Deferred   Extremities:  Moves all extremities well, no edema, no cyanosis, no redness   Pulses:  Pulses palpable and equal bilaterally   Skin:  No bleeding, bruising or rash   Lymph nodes:  No palpable adenopathy   Neurologic:  Cranial nerves 2 - 12 grossly intact, sensation intact, DTR present and equal bilaterally       Results Review:    I reviewed the patient's new clinical results.  I reviewed the patient's new imaging results and agree with the interpretation.  I reviewed the patient's other test results and agree with the interpretation  I personally viewed and interpreted the patient's EKG/Telemetry data    Assessment & Plan       Septic shock    Acute on chronic respiratory failure    COPD with acute exacerbation    ESRD (end stage renal disease)    DM2 (diabetes mellitus, type 2)    CAD (coronary artery disease)      Patient admitted to ICU  Closely monitor  hemodynamics  Levophed  Echocardiogram  Empiric antibiotics  DuoNebs 4 times a day and as needed  Insulin sliding scale  Cardio consulted, consultant notified  Nephrology consulted, consultant notified  Full code    Bill Devine MD  10/28/24  20:36 EDT

## 2024-10-29 NOTE — PROGRESS NOTES
RT EQUIPMENT DEVICE RELATED - SKIN ASSESSMENT    RT Medical Equipment/Device:     NIV Mask:  Full-face    size: l    Skin Assessment:      Cheek:  Intact  Chin:  Intact  Neck:  Intact  Nose:  Intact    Device Skin Pressure Protection:  Skin-to-device areas padded:  None Required    Nurse Notification:  Ernestina Arora, RRT

## 2024-10-29 NOTE — NURSING NOTE
Patient ran 3:30 hrs of dialysis treatment. Unable to remove any UF due to low BP. Had a BVP of 84. TDC dressing changed.   Reported off to Kathi WING

## 2024-10-29 NOTE — PROGRESS NOTES
RT EQUIPMENT DEVICE RELATED - SKIN ASSESSMENT    RT Medical Equipment/Device:     NIV Mask:  Full-face    size: L    Skin Assessment:      Cheek:  Intact  Chin:  Intact  Nose:  Intact    Device Skin Pressure Protection:  Pressure points protected    Nurse Notification:  Ernestina Quinn, RRT

## 2024-10-29 NOTE — PLAN OF CARE
Goal Outcome Evaluation:   Pt has been on BiPAP 12/5 RR 14 continuously since 2039. Pt is doing well at this time. Will continue to monitor.

## 2024-10-29 NOTE — CONSULTS
Pulmonary / Critical Care Consult Note      Patient Name: Pedro Tilley  : 1945  MRN: [id number]  Primary Care Physician:  Yessica Sr APRN  Referring Physician: Dillon Langford MD  Date of admission: 10/28/2024    Subjective   Subjective     Reason for Consult/ Chief Complaint:   Shock    HPI:  Pedro Tilley is a 78 y.o. male with history of ESRD on HD initially presented to outside facility for hypotension while at dialysis.  He was also found to be hypoxic, reported 7280 SpO2 on room air.  He was transferred here for higher level of care and admitted to the ICU.  This morning patient is confused.  He was unable to tolerate BiPAP overnight and is currently on 3 L nasal cannula.  Norepinephrine currently at 0.12.  Labs significant for lactic acidosis 2.1 initially, now 1.4.  ABG 7.2 .  He was initiated on broad-spectrum antibiotic with vancomycin and cefepime.    Review of Systems  Unable to obtain    Personal History     Past Medical History:   Diagnosis Date    Arthritis     Cancer     Coronary artery disease     Diabetes mellitus     Dialysis patient     History of transfusion     Hypertension     Kidney disease        History reviewed. No pertinent surgical history.    Family History: family history is not on file. Otherwise pertinent FHx was reviewed and not pertinent to current issue.    Social History:  reports that he has been smoking cigarettes. He started smoking about 65 years ago. He has a 65.2 pack-year smoking history. He does not have any smokeless tobacco history on file. He reports that he does not drink alcohol and does not use drugs.    Home Medications:  Budeson-Glycopyrrol-Formoterol, Insulin Degludec, SITagliptin, allopurinol, apixaban, atorvastatin, bumetanide, calcium acetate, carvedilol, fenofibrate, ferrous sulfate, gabapentin, levothyroxine, loratadine, metoclopramide, midodrine, mupirocin, pantoprazole, primidone, sodium zirconium cyclosilicate, and vitamin  D    Allergies:  Allergies   Allergen Reactions    Ketamine Anaphylaxis       Objective    Objective     Vitals:   Temp:  [98 °F (36.7 °C)-98.7 °F (37.1 °C)] 98.7 °F (37.1 °C)  Heart Rate:  [75-85] 75  Resp:  [18-22] 22  BP: ()/() 118/41  Flow (L/min) (Oxygen Therapy):  [2-3] 3    Physical Exam:  Vital Signs Reviewed   General:  Alert, elderly male, lying in bed  HEENT:  PERRL, EOMI.  OP  Neck:  Supple, no JVD, no thyromegaly  Chest: Diminished to auscultation bilaterally, no work of breathing noted on 2 L nasal cannula  CV: RRR, no MGR, pulses 2+, equal.  Abd:  Soft, NT, ND, + BS, no HSM  EXT:  no clubbing, no cyanosis, no edema  Neuro: CN grossly intact, no focal deficits.  Skin: No rashes or lesions noted    Result Review    Result Review:  I have personally reviewed the results from the time of this admission to 10/29/2024 10:50 EDT and agree with these findings:  []  Laboratory  []  Microbiology  []  Radiology  []  EKG/Telemetry   []  Cardiology/Vascular   []  Pathology  []  Old records  []  Other:  Most notable findings include:     Assessment & Plan   Assessment / Plan     Active Hospital Problems:  Active Hospital Problems    Diagnosis     **Septic shock     Acute on chronic respiratory failure     CAD (coronary artery disease)     DM2 (diabetes mellitus, type 2)     COPD with acute exacerbation     ESRD (end stage renal disease)          Impression:  Septic shock  Acute hypoxic respiratory failure  Volume overload  Elevated proBNP  Community-acquired pneumonia, unspecified organism  Lactic acidosis  ESRD on HD    Plan:  -Currently on 2 L nasal cannula.  -Pressors: Continue norepinephrine to keep MAP greater than 65  -Continue midodrine 5 mg 3 times daily  -Cont aspiration precautions. Keep HOB 30 deg.   -Continue broad-spectrum antibiotics for now.  De-escalate based on cultures  -Panculture.  Check procalcitonin.  Strep pneumo and Legionella antigen.  Respiratory viral panel.  -Start airway  clearance   -Start nebs and bronchopulmonary hygiene  -2D echo ordered  -ESRD on HD per nephrology.  Patient has a right subclavian TDC  -Replace electrolytes PRN to keep K 4.0, Mag 2.0, Phos 4.0.  -Keep glucose 140-180 while in ICU. Cont SSI.  -Transfuse to keep Hgb >7  -DVT ppx: Can restart home Eliquis  -GI ppx: Can restart home PPI  -Lines: Peripheral IV, right subclavian TDC  -Speech eval when patient is more alert    VTE Prophylaxis:  No VTE prophylaxis order currently exists.       Code Status and Medical Interventions: CPR (Attempt to Resuscitate); Full Support   Ordered at: 10/28/24 2009     Code Status (Patient has no pulse and is not breathing):    CPR (Attempt to Resuscitate)     Medical Interventions (Patient has pulse or is breathing):    Full Support      The patient is critically ill in the ICU with septic shock, metabolic acidosis, encephalopathy. Multidisciplinary bedside critical care rounds were performed with nursing staff, respiratory therapy, pharmacy, nutritional services, social work. I have personally reviewed the chart, labs and any pertinent imaging available.  I have spent 32 minutes of critical care time, excluding procedures, in the care of this patient.    Electronically signed by Maya Bernal MD, 10/29/24, 10:50 AM EDT.

## 2024-10-29 NOTE — PROGRESS NOTES
[facility]   Hospitalist Progress Note  Date: 10/29/2024  Patient Name: Pedro Tilley  : 1945  MRN: [id number]  Date of admission: 10/28/2024  Consultants:   -Pulmonology: Dr. Maya Bernal  -Nephrology: Dr. Nain Wetzel  -Cardiology: Dr. Fausto Joseph    Subjective   Subjective     Chief Complaint: Low blood pressure at dialysis    Summary:   Pedro Tilley is a 78 y.o. male with ESRD on hemodialysis, chronic systolic heart failure that presented to outside facility for dialysis was found to be hypotensive so transferred to [facility] for further evaluation and management.  Initially there was some concern for cardiogenic shock at outlying facility and patient was started on dobutamine, lactic acid was found to be normal and limited echo was obtained that showed moderate pericardial effusion without tamponade.  Cardiology [facility] contacted since lactic acid normal but there was low concern for cardiogenic shock and patient switched due to Levophed.  Pulmonology and nephrology services also consulted to assist in care of the patient.  Broad-spectrum antibiotics started for community-acquired pneumonia.    Interval Followup:   Patient confused on exam.  Continues on supplemental O2.  Continues on Levophed.  Lactic acid noted to be 2.1.  proBNP significantly elevated.    Antibiotics:   -Vancomycin  -Cefepime    Objective   Objective     Vitals:   Temp:  [98 °F (36.7 °C)-98.7 °F (37.1 °C)] 98.7 °F (37.1 °C)  Heart Rate:  [75-85] 75  Resp:  [18-22] 22  BP: ()/() 118/41  Flow (L/min) (Oxygen Therapy):  [2-3] 3  Physical Exam   Gen: Acutely ill-appearing chronically ill male, sitting up in bed, awake but confused  Resp: Diminished breath sounds bilaterally, crackles noted, equal chest rise bilaterally  Card: RRR, No m/r/g  Abd: Soft, Nontender, Nondistended, + bowel sounds    Result Review    Result Review:  I have personally reviewed the results as below and agree with these  findings:  []  Laboratory:   CMP          9/10/2024    05:10 10/28/2024    20:49 10/29/2024    02:26   CMP   Glucose 94  171  197    BUN 25  59  61    Creatinine 4.72  9.74  10.09    EGFR 12.1  5.1  4.9    Sodium 135  131  133    Potassium 3.7  4.2  4.5    Chloride 98  91  93    Calcium 7.8  8.4  8.5    Total Protein 6.2      Albumin 2.7      Total Bilirubin 0.6      Alkaline Phosphatase 76      AST (SGOT) 28      ALT (SGPT) 9      BUN/Creatinine Ratio 5.3  6.1  6.0    Anion Gap 14.1  19.2  19.8      CBC          10/25/2024    00:00 10/28/2024    20:49 10/29/2024    02:26   CBC   WBC  12.75  14.27    RBC  3.74  3.68    Hemoglobin 11.1        33.3     10.9  11.0    Hematocrit  34.9  34.5    MCV  93.3  93.8    MCH  29.1  29.9    MCHC  31.2  31.9    RDW  16.1  16.0    Platelets  242  271       Details          This result is from an external source.           proBNP: 22,531.  High sensitive troponin: 117.  Procalcitonin: 0.90.  [x]  Microbiology: Blood culture (10/29/2024): Pending.  []  Radiology:   [x]  EKG/Telemetry:    []  Cardiology/Vascular:    []  Pathology:  []  Old records:  []  Other:    Assessment & Plan   Assessment / Plan     Assessment:  Community-acquired pneumonia due to unknown infectious organism  Septic shock secondary to above  Acute on chronic hypoxic respiratory failure  Volume overload  Elevated proBNP  Acute on chronic heart failure with reduced ejection fraction  Acute COPD exacerbation  ESRD on hemodialysis  Hypothyroidism  Obesity (BMI: 30.99)    Plan:  -Pulmonology, Cardiology and Nephrology consulted and following, appreciate assistance and recommendations in the care of this patient.  -Continue Levophed to maintain MAP greater 65, wean as tolerated  -Start midodrine  -Continue cefepime and vancomycin.  Tailor based on results of infectious workup  -Monitor vancomycin level  -Continue Brovana, Pulmicort and DuoNebs  -NIPPV therapy as tolerated  -Continue supplemental O2 to maintain sats  greater than 90%, wean as tolerated  -Echocardiogram be obtained.  Follow-up results  -Hemodialysis per nephrology.  Plan for dialysis today.  -Will monitor electrolytes and renal function with BMP and magnesium level in the AM  -Will monitor WBC and Hgb with CBC in the AM  -Clinical course will dictate further management     DVT Prophylaxis: Heparin  Diet:   Diet Order   Procedures    Diet: Cardiac, Diabetic; Healthy Heart (2-3 Na+); Consistent Carbohydrate; Fluid Consistency: Thin (IDDSI 0)     Dispo: Remain in ICU     Pt is critically ill due to septic shock, community-acquired pneumonia due to unknown infectious organism, acute on chronic complex respiratory failure, acute on chronic heart failure reduced ejection fraction, sick acute COPD exacerbation, ESRD on hemodialysis.  Critical care time spent in direct patient care: 38 minutes.  This included high complexity decision making to assess, manipulate and support vital organ system failure in this individual who has impairment of one or more vital organ systems such that there is a high probability of imminent or life threatening deterioration in the patient's condition.  Patient's management was discussed with the patient, her nurse at bedside and the following consultants: Pulmonology, Cardiology and Nephrology.     Part of this note may be an electronic transcription/translation of spoken language to printed text using the Dragon dictation system.    VTE Prophylaxis:  No VTE prophylaxis order currently exists.        CODE STATUS:   Code Status (Patient has no pulse and is not breathing): CPR (Attempt to Resuscitate)  Medical Interventions (Patient has pulse or is breathing): Full Support        Electronically signed by Dillon Langford MD, 10/29/2024, 12:00 EDT.

## 2024-10-29 NOTE — PROGRESS NOTES
"[facility] Clinical Pharmacy Services: Vancomycin Pharmacokinetic Initial Consult Note    Pedro Tilley is a 78 y.o. male who is on day 1 of pharmacy to dose vancomycin for Empiric.    Consult Information  Consulting Provider: Sybil  Planned Duration of Therapy: 7 days  Was Patient Receiving Prior to Admission/Consult?: No  Loading Dose Ordered or Given: 2250 mg on 10/29/24 at 0058  PK/PD Target: -600 mg/L.hr   Relevant ID History:     Mycoplasma pneumo IgM on 9/3/24    Other Antimicrobials: Cefepime    Imaging Reviewed?: Yes    Microbiology Data  MRSA PCR performed: No; Result: Not ordered due to excluded indication or presence of suspected abscess  Culture/Source:       Vitals/Labs  Ht: 188 cm (74\"); Wt: 110 kg (241 lb 6.5 oz)  Temp (24hrs), Av.2 °F (36.8 °C), Min:98.1 °F (36.7 °C), Max:98.2 °F (36.8 °C)   Estimated Creatinine Clearance: 8.5 mL/min (A) (by C-G formula based on SCr of 9.74 mg/dL (H)).       Results from last 7 days   Lab Units 10/28/24  2049   CREATININE mg/dL 9.74*   WBC 10*3/mm3 12.75*     Assessment/Plan:    Vancomycin Dose: pulse dosing with 2250 mg IV once on 10/29/24 at 0058  Vanc Random ordered for 10/29/24 at 1700  Patient has order for Basic Metabolic Panel    Pharmacy will follow patient's kidney function and will adjust doses and obtain levels as necessary. Thank you for involving pharmacy in this patient's care. Please contact pharmacy with any questions or concerns.                           Leno Mane, [facility]  Clinical Pharmacist   "

## 2024-10-29 NOTE — CONSULTS
Cardiology Consult Note  [facility] INTENSIVE CARE UNIT          Patient Identification:  Pedro Tilley      [id number]  78 y.o.        male  1945       Reason for Consultation: Possible cardiogenic shock    PCP: Yessica Sr APRN    History of Present Illness:  Mr. Vasquez is a 78-year-old male with a past medical history of ESRD on HD, DM2, HTN, CAD, COPD, paroxysmal atrial fibrillation, chronic diastolic heart failure who was transferred from an outside hospital with a concern of shock.  Cardiology team was consulted for a possible cardiogenic shock.    In brief, patient is confused and is not able to state history. As per chart review he went for dialysis and was noted to be hypotensive from there he was sent to [facility], after initial evaluation patient was noted to be in shock and was transferred to [facility] for further management.    Patient is currently on Levophed to keep his blood pressures optimal and is undergoing workup for sepsis.      Past History:  Past Medical History:   Diagnosis Date    Arthritis     Cancer     Coronary artery disease     Diabetes mellitus     Dialysis patient     History of transfusion     Hypertension     Kidney disease      History reviewed. No pertinent surgical history.  Allergies   Allergen Reactions    Ketamine Anaphylaxis     Social History     Socioeconomic History    Marital status:    Tobacco Use    Smoking status: Every Day     Current packs/day: 1.00     Average packs/day: 1 pack/day for 65.2 years (65.2 ttl pk-yrs)     Types: Cigarettes     Start date: 9/2/1959   Vaping Use    Vaping status: Never Used   Substance and Sexual Activity    Alcohol use: Never    Drug use: Never    Sexual activity: Defer     History reviewed. No pertinent family history.    Medications:  Prior to Admission medications    Medication Sig Start Date End Date Taking? Authorizing Provider   allopurinol (ZYLOPRIM) 100 MG tablet Take  1 tablet by mouth Daily.   Yes Brittney Murphy MD   apixaban (ELIQUIS) 2.5 MG tablet tablet Take 1 tablet by mouth 2 (Two) Times a Day.   Yes Brittney Murphy MD   atorvastatin (LIPITOR) 10 MG tablet Take 1 tablet by mouth Every Night.   Yes Brittney Murphy MD   carvedilol (COREG) 6.25 MG tablet Take 1 tablet by mouth 2 (Two) Times a Day With Meals.   Yes Brittney Murphy MD   fenofibrate 160 MG tablet Take 1 tablet by mouth Every Morning.   Yes Brittney Murphy MD   ferrous sulfate 325 (65 FE) MG tablet Take 1 tablet by mouth Daily With Breakfast.   Yes Brittney Murphy MD   gabapentin (NEURONTIN) 100 MG capsule Take 1 capsule by mouth Every Morning.   Yes Brittney Murphy MD   gabapentin (NEURONTIN) 300 MG capsule Take 1 capsule by mouth Every Night.   Yes Brittney Murphy MD   Insulin Degludec (TRESIBA FLEXTOUCH) 200 UNIT/ML solution pen-injector pen injection Inject 0-60 Units under the skin into the appropriate area as directed Daily As Needed (BG over 200 mg/dl).   Yes Brittney Murphy MD   levothyroxine (SYNTHROID, LEVOTHROID) 125 MCG tablet Take 1 tablet by mouth Every Morning.   Yes Brittney Murphy MD   loratadine (CLARITIN) 10 MG tablet Take 1 tablet by mouth Every Morning.   Yes Brittney Murphy MD   metoclopramide (REGLAN) 5 MG tablet Take 1 tablet by mouth 2 (Two) Times a Day.   Yes Brittney Murphy MD   midodrine (PROAMATINE) 5 MG tablet Take 1 tablet by mouth 3 (Three) Times a Day As Needed (If SBP <100).   Yes Brittney Murphy MD   pantoprazole (PROTONIX) 40 MG EC tablet Take 1 tablet by mouth Every Morning.   Yes Brittney Murphy MD   primidone (MYSOLINE) 50 MG tablet Take 1 tablet by mouth 3 (Three) Times a Week. M-W-F after dialysis for essential tremors   Yes Brittney Murphy MD   SITagliptin (JANUVIA) 50 MG tablet Take 1 tablet by mouth Daily.   Yes Brittney Murphy MD   vitamin D (ERGOCALCIFEROL) 1.25 MG (37251  "UT) capsule capsule Take 1 capsule by mouth 1 (One) Time Per Week. Saturday   Yes Brittney Murphy MD   Budeson-Glycopyrrol-Formoterol (Breztri Aerosphere) 160-9-4.8 MCG/ACT aerosol inhaler Inhale 2 puffs 2 (Two) Times a Day.  10/29/24  Brittney Murphy MD   bumetanide (BUMEX) 2 MG tablet Take 1 tablet by mouth 2 (Two) Times a Day.  10/29/24  Brittney Murphy MD   calcium acetate (PHOS BINDER,) 667 MG capsule capsule Take 2 capsules by mouth 3 (Three) Times a Day.  10/29/24  Brittney Murphy MD   mupirocin (BACTROBAN) 2 % ointment Apply 1 Application topically to the appropriate area as directed 3 (Three) Times a Day.  10/29/24  Brittney Murphy MD   SITagliptin (JANUVIA) 50 MG tablet Take 1 tablet by mouth Daily.  10/29/24  Brittney Murphy MD   sodium zirconium cyclosilicate (LOKELMA) 10 g packet Take 10 g by mouth Daily.  10/29/24  Brittney Murphy MD      Current medications:  arformoterol, 15 mcg, Nebulization, BID - RT  budesonide, 0.5 mg, Nebulization, BID - RT  cefepime, 2,000 mg, Intravenous, Q24H  insulin lispro, 2-9 Units, Subcutaneous, 4x Daily AC & at Bedtime  ipratropium-albuterol, 3 mL, Nebulization, 4x Daily - RT  levothyroxine, 125 mcg, Oral, Q AM  midodrine, 5 mg, Oral, TID AC  mupirocin, 1 Application, Each Nare, BID  senna-docusate sodium, 2 tablet, Oral, BID  sodium chloride, 10 mL, Intravenous, Q12H          Physical Exam    /41 (BP Location: Left arm, Patient Position: Lying)   Pulse 75   Temp 98.7 °F (37.1 °C) (Oral)   Resp 22   Ht 188 cm (74\")   Wt 110 kg (241 lb 6.5 oz)   SpO2 95%   BMI 30.99 kg/m²  Body mass index is 30.99 kg/m².   Oxygen saturation   @FLOWAN(10::1)@ SpO2  Min: 74 %  Max: 100 %    Constitutional:  Awake. Not in acute distress. Normal appearance.   Neck: No carotid bruit, hepatojugular reflux or JVD.   Cardiovascular:      Rate and Rhythm: Normal rate and regular rhythm.      Chest Wall: PMI is not displaced.      Heart " "sounds: Normal heart sounds, S1 normal and S2 normal. No murmur heard.       No friction rub. No gallop. No S3 or S4 sounds.    Pulmonary: Pulmonary effort is normal. Normal breath sounds. No wheezing, rhonchi or rales.   Extremities: No Bilateral edema is noted.   Skin: Warm and dry. Non cyanotic, No petechiae or rash.   Neurological: Alert and oriented x 3    Cardiographics:     ECG  (personally reviewed) atrial fibrillation Vpaced rhythm, compared to EKG from September 2, 2024 no significant changes are noted   Telemetry:  (personally reviewed) atrial fibrillation with V paced rhythm          No results found for this or any previous visit.      Cardiolite (Tc-99m Sestamibi) stress test   Lab Review:       CBC          10/25/2024    00:00 10/28/2024    20:49 10/29/2024    02:26   CBC   WBC  12.75  14.27    RBC  3.74  3.68    Hemoglobin 11.1        33.3     10.9  11.0    Hematocrit  34.9  34.5    MCV  93.3  93.8    MCH  29.1  29.9    MCHC  31.2  31.9    RDW  16.1  16.0    Platelets  242  271       Details          This result is from an external source.               CMP          9/10/2024    05:10 10/28/2024    20:49 10/29/2024    02:26   CMP   Glucose 94  171  197    BUN 25  59  61    Creatinine 4.72  9.74  10.09    EGFR 12.1  5.1  4.9    Sodium 135  131  133    Potassium 3.7  4.2  4.5    Chloride 98  91  93    Calcium 7.8  8.4  8.5    Total Protein 6.2      Albumin 2.7      Total Bilirubin 0.6      Alkaline Phosphatase 76      AST (SGOT) 28      ALT (SGPT) 9      BUN/Creatinine Ratio 5.3  6.1  6.0    Anion Gap 14.1  19.2  19.8         CARDIAC LABS:      Lab 10/29/24  0226 10/28/24  2338 10/28/24  2049   PROBNP 22,531.0*  --   --    HSTROP T  --  117* 119*   PROTIME 18.2*  --  17.8*   INR 1.48*  --  1.44*      No results found for: \"DIGOXIN\"   No results found for: \"TSH\"        Invalid input(s): \"LDLCALC\"  No results found for: \"POCTROP\"  No results found for: \"DDIMERQUAN\"  Lab Results   Component Value Date    " MG 2.2 10/29/2024             CARDIAC LABS:      Lab 10/29/24  0226 10/28/24  2338 10/28/24  2049   PROBNP 22,531.0*  --   --    HSTROP T  --  117* 119*   PROTIME 18.2*  --  17.8*   INR 1.48*  --  1.44*        Imaging:  CXR  Pulmonary edema with small left-sided pleural effusion  Assessment:    Septic shock    Acute on chronic respiratory failure    COPD with acute exacerbation    ESRD (end stage renal disease)    DM2 (diabetes mellitus, type 2)    CAD (coronary artery disease)  Paroxysmal atrial fibrillation  Large pericardial effusion    Plan:    Patient's echocardiogram was reviewed.  LV systolic function is preserved.  Moderate to large pericardial effusion is present.  Fibrinous material was visualized in pericardial cavity.  Pericardial effusion is likely related to end-stage renal disease.  Pericardiocentesis is not recommended currently  Sepsis workup per primary team  Continue anticoagulation to prevent stroke risk due to A-fib.  We recommend getting dialysis while on pressors it will also help in resolution of pericardial effusion.    Cardiology team will follow along.    Thank you for allowing us to share in [facility]. Please call with any questions or concerns.             Fausto Joseph MD   10/29/2024    11:45 EDT

## 2024-10-29 NOTE — PLAN OF CARE
Goal Outcome Evaluation:  Plan of Care Reviewed With: patient, spouse        Progress: no change  Outcome Evaluation: Patient is confused on 4L NC. Dialysis completed today. Patient is on Levophed and Vasopressin. Wife at bedside.

## 2024-10-30 NOTE — PROGRESS NOTES
[facility]   Hospitalist Progress Note  Date: 10/30/2024  Patient Name: Pedro Tilley  : 1945  MRN: [id number]  Date of admission: 10/28/2024  Consultants:   -Pulmonology: Dr. Maya Bernal  -Nephrology: Dr. Nain Wetzel  -Cardiology: Dr. Fausto Joseph    Subjective   Subjective     Chief Complaint: Low blood pressure at dialysis    Summary:   Pedro Tilley is a 78 y.o. male with ESRD on hemodialysis, chronic systolic heart failure that presented to outside facility for dialysis was found to be hypotensive so transferred to [facility] for further evaluation and management.  Initially there was some concern for cardiogenic shock at outlying facility and patient was started on dobutamine, lactic acid was found to be normal and limited echo was obtained that showed moderate pericardial effusion without tamponade.  Cardiology [facility] contacted since lactic acid normal but there was low concern for cardiogenic shock and patient switched due to Levophed.  Pulmonology and nephrology services also consulted to assist in care of the patient.  Broad-spectrum antibiotics started for community-acquired pneumonia.  Echocardiogram was obtained and showed preserved LV systolic function and moderate to large pericardial effusion, fibrinous material visualized in pericardial cavity.  Cardiology noted pericardial effusion likely related to ESRD and pericardial centesis not recommended at this time.    Interval Followup:   Patient confused. Remains on Levophed. Was on vasopressin but this has been weaned off overnight. Patient tolerated dialysis.  Echocardiogram completed, results reviewed as noted above.    Antibiotics:   -Cefepime    Objective   Objective     Vitals:   Temp:  [98 °F (36.7 °C)-98.9 °F (37.2 °C)] 98.1 °F (36.7 °C)  Heart Rate:  [] 75  Resp:  [11-27] 23  BP: ()/() 96/83  Flow (L/min) (Oxygen Therapy):  [3] 3  Physical Exam   Gen: Acutely ill-appearing chronically ill  male, sitting on bed, family at bedside  Resp: Equal chest rise bilaterally, normal respiratory effort, diminished breath sounds bilaterally  Card: RRR, No m/r/g  Abd: Soft, Nontender, Nondistended, + bowel sounds    Result Review    Result Review:  I have personally reviewed the results as below and agree with these findings:  []  Laboratory:   CMP          10/28/2024    20:49 10/29/2024    02:26 10/29/2024    13:57 10/30/2024    03:08   CMP   Glucose 171  197  184  125    BUN 59  61  51  36    Creatinine 9.74  10.09  8.70  6.53    EGFR 5.1  4.9  5.8  8.2    Sodium 131  133  138  135    Potassium 4.2  4.5  4.6  4.5    Chloride 91  93  99  96    Calcium 8.4  8.5  8.0  8.7    Albumin   3.1     BUN/Creatinine Ratio 6.1  6.0  5.9  5.5    Anion Gap 19.2  19.8  18.9  19.5      CBC          10/28/2024    20:49 10/29/2024    02:26 10/30/2024    03:08   CBC   WBC 12.75  14.27  15.86    RBC 3.74  3.68  3.59    Hemoglobin 10.9  11.0  10.5    Hematocrit 34.9  34.5  33.8    MCV 93.3  93.8  94.2    MCH 29.1  29.9  29.2    MCHC 31.2  31.9  31.1    RDW 16.1  16.0  16.1    Platelets 242  271  292    Phosphorus elevated.  Magnesium within normal limits.  INR: 1.47  [x]  Microbiology: Blood culture (10/29/2024): No growth to date.  []  Radiology:   [x]  EKG/Telemetry:    []  Cardiology/Vascular:    []  Pathology:  []  Old records:  []  Other:    Assessment & Plan   Assessment / Plan     Assessment:  Community-acquired pneumonia due to unknown infectious organism  Septic shock secondary to above  Acute on chronic hypoxic respiratory failure  Acute on chronic heart failure with preserved ejection fraction  Acute COPD exacerbation  Moderate to large pericardial effusion  Volume overload  Elevated proBNP  ESRD on hemodialysis  Hypothyroidism  Obesity (BMI: 30.99)    Plan:  -Pulmonology, Cardiology and Nephrology consulted and following, appreciate assistance and recommendations in the care of this patient.  -Continue Levophed to maintain  MAP greater than 65, wean as tolerated  -Continue midodrine  -MRSA screen negative.  Discontinue vancomycin.  Continue cefepime and tailor further based on results of infectious workup  -Continue Brovana, Pulmicort and DuoNebs  -NIPPV therapy as tolerated  -Continue supplemental O2 to maintain sats greater than 90%, wean as tolerated  -Echocardiogram results reviewed as noted above  -Hemodialysis per nephrology  -Monitor electrolytes and renal function with BMP, phosphorus level and magnesium level in the AM  -Monitor WBC and Hgb with CBC in the AM  -Clinical course will dictate further management     DVT Prophylaxis: Heparin  Diet:   Diet Order   Procedures    NPO Diet NPO Type: Strict NPO     Dispo: Remain in ICU     Pt is critically ill due to septic shock, community-acquired pneumonia due to unknown infectious organism, acute on chronic complex respiratory failure, acute on chronic heart failure reduced ejection fraction, sick acute COPD exacerbation, ESRD on hemodialysis.  Critical care time spent in direct patient care: 35 minutes.  This included high complexity decision making to assess, manipulate and support vital organ system failure in this individual who has impairment of one or more vital organ systems such that there is a high probability of imminent or life threatening deterioration in the patient's condition.  Patient's management was discussed with the patient, her nurse at bedside and the following consultants: Pulmonology, Cardiology and Nephrology.     Part of this note may be an electronic transcription/translation of spoken language to printed text using the Dragon dictation system.    VTE Prophylaxis:  Pharmacologic VTE prophylaxis orders are present.      CODE STATUS:   Code Status (Patient has no pulse and is not breathing): CPR (Attempt to Resuscitate)  Medical Interventions (Patient has pulse or is breathing): Full Support      Electronically signed by Dillon Langford MD, 10/30/2024, 11:47  EDT.

## 2024-10-30 NOTE — THERAPY EVALUATION
Acute Care - Speech Language Pathology   Swallow Initial Evaluation RENEE Coleman     Patient Name: Pedro Tilley  : 1945  MRN: [id number]  Today's Date: 10/30/2024               Admit Date: 10/28/2024    Visit Dx:     ICD-10-CM ICD-9-CM   1. Dysphagia, oropharyngeal  R13.12 787.22     Patient Active Problem List   Diagnosis    Hyperkalemia    Acute on chronic respiratory failure    COPD with acute exacerbation    ESRD (end stage renal disease)    HTN (hypertension)    DM2 (diabetes mellitus, type 2)    CAD (coronary artery disease)    Septic shock     Past Medical History:   Diagnosis Date    Arthritis     Cancer     Coronary artery disease     Diabetes mellitus     Dialysis patient     History of transfusion     Hypertension     Kidney disease      History reviewed. No pertinent surgical history.      Inpatient Speech Pathology Dysphagia Evaluation        PAIN SCALE: None noted    PRECAUTIONS/CONTRAINDICATIONS: None noted    SUSPECTED ABUSE/NEGLECT/EXPLOITATION: None noted    SOCIAL/PSYCHOLOGICAL NEEDS/BARRIERS: None noted    PAST SOCIAL HISTORY: Lives at home    PRIOR FUNCTION: Independent    PATIENT GOALS/EXPECTATIONS: Did not report    HISTORY: This patient is a 78-year-old admitted after being found unresponsive at dialysis.  Patient with history of end-stage renal disease.  Admitted with septic shock.    CURRENT DIET LEVEL: N.p.o.    OBJECTIVE:    TEST ADMINISTERED: Clinical dysphagia evaluation    COGNITION/SAFETY AWARENESS: Confused    BEHAVIORAL OBSERVATIONS: Cooperative    ORAL MOTOR EXAM: Generalized oral motor weakness noted, dry oral mucosa    VOICE QUALITY: Hoarse    REFLEX EXAM: Deferred    POSTURE: 90 degrees upright    FEEDING/SWALLOWING FUNCTION: Assessed with ice chips, spoonfed honey thick liquids and purée consistencies only    CLINICAL OBSERVATIONS: Oral stage is characterized by diminished bolus preparation, holding bolus at times with mod cues to complete swallow.  Swallow completed with  honey thick and purée, no cough noted however increased laryngeal congestion noted to auscultation.  Appears to have reduced laryngeal elevation per palpation.  Denies globus sensation after completion of swallow.    DYSPHAGIA CRITERIA: Risk of aspiration    FUNCTIONAL ASSESSMENT INSTRUMENT: Patient currently scored a level 1 of 7 on Functional Communication Measures for swallowing indicating a 100% limitation in function.    ASSESSMENT/ PLAN OF CARE:  Pt presents with limitations, noted below, that impede his ability to swallow safely. The skills of a therapist will be required to safely and effectively implement the following treatment plan to restore maximal level of function.    PROBLEMS:  1.  Dysphagia   LTG 1: (30 days) patient will increase ability to tolerate least restrictive diet and improve functional communication measure for swallowing to a 4 of 7   STG 1a: (14 days) patient will tolerate therapeutic trials of thin, nectar, honey thick liquids without clinical sign or symptom of aspiration with 8 of 10 trials.   STG 1b: (14 days) patient will tolerate therapeutic trials of purée and soft solids without clinical sign or symptom of aspiration with 8 of 10 trials.   STG 1c: (14 days) patient/family teaching regarding diet recommendations, safe swallow strategies and signs and symptoms of aspiration.      TREATMENT: Dysphagia therapy to ensure diet tolerance, advance to least restrictive diet and analyze aspiration risk    FREQUENCY/DURATION: Daily 5 times a week    REHAB POTENTIAL:  Pt has good rehab potential.  The following limitations may influence improvement/ length of tx medical status.    RECOMMENDATIONS:   1.   DIET: Unable to recommend safe p.o. diet at this time.  Continue routine oral care.  Speech therapy will follow-up in the a.m. and reassess.    May benefit from short-term tube feeds until mental status improves    Pt/responsible party agrees with plan of care and has been informed of all  alternatives, risks and benefits.    EDUCATION  The patient has been educated in the following areas:   Dysphagia (Swallowing Impairment).        Adelaida Webber, SLP  10/30/2024

## 2024-10-30 NOTE — PROGRESS NOTES
Pulmonary / Critical Care Progress Note      Patient Name: Pedro Tilley  : 1945  MRN: [id number]  Attending:  Dillon Langford MD   Date of admission: 10/28/2024    Subjective   Subjective   Patient critically ill with shock    Over past 24 hours:  Doing fair this morning  Alert and oriented x 1  Levo at 0.06, vaso 0.04  Tolerated some BiPAP overnight  Currently on 4 L nasal cannula  Respiratory viral panel negative  Tolerated dialysis yesterday    Review of Systems  Unable to obtain      Objective   Objective     Vitals:   Vital signs for last 24 hours:  Temp:  [98 °F (36.7 °C)-98.9 °F (37.2 °C)] 98.6 °F (37 °C)  Heart Rate:  [] 75  Resp:  [11-27] 25  BP: ()/() 124/50    Intake/Output last 3 shifts:  I/O last 3 completed shifts:  In:  [I.V.:1762; IV Piggyback:100]  Out: -200   Intake/Output this shift:  No intake/output data recorded.    Vent settings for last 24 hours:       Hemodynamic parameters for last 24 hours:       Physical Exam   Vital Signs Reviewed   General: Elderly male, lying in bed  HEENT:  PERRL, EOMI.  OP  Chest:  Clear to auscultation bilaterally, no work of breathing noted on 4 L nasal cannula  CV: RRR, no MGR, pulses 2+, equal.  Abd:  Soft, NT, ND, + BS, overweight  EXT:  no clubbing, no cyanosis, no edema  Neuro:  A&Ox1, CN grossly intact, no focal deficits.  Skin: No rashes or lesions noted    Result Review    Result Review:  I have personally reviewed the results from the time of this admission to 10/30/2024 12:50 EDT and agree with these findings:  []  Laboratory  []  Microbiology  []  Radiology  []  EKG/Telemetry   []  Cardiology/Vascular   []  Pathology  []  Old records  []  Other:  Most notable findings include:   -    Assessment & Plan   Assessment / Plan     Active Hospital Problems:  Active Hospital Problems    Diagnosis     **Septic shock     Acute on chronic respiratory failure     CAD (coronary artery disease)     DM2 (diabetes mellitus, type 2)     COPD  with acute exacerbation     ESRD (end stage renal disease)          Impression:  Septic shock  Acute hypoxic respiratory failure  Volume overload  Pericardial effusion without tamponade physiology  Elevated proBNP  Community-acquired pneumonia, unspecified organism  Lactic acidosis  ESRD on HD     Plan:  -Currently on 2 L nasal cannula.  -Pressors: Continue norepinephrine and vaso to keep MAP greater than 65  -Continue midodrine 5 mg 3 times daily  -Cont aspiration precautions. Keep HOB 30 deg.   -Continue cefepime.  Vancomycin stopped.  De-escalate based on cultures.  -Panculture.  Check procalcitonin.  Strep pneumo and Legionella antigen.  Respiratory viral panel.  -Continue with airway clearance  -Continue nebs and bronchopulmonary hygiene  -2D echo with EF greater than 55% with diastolic dysfunction.  There is a large pericardial effusion without tamponade physiology.  -Cardiology aware, appreciate assistance  -ESRD on HD per nephrology.  Patient has a right subclavian TDC  -Replace electrolytes PRN to keep K 4.0, Mag 2.0, Phos 4.0.  -Keep glucose 140-180 while in ICU. Cont SSI.  -Transfuse to keep Hgb >7  -Continue home levothyroxine  -DVT ppx: Subcutaneous heparin  -GI ppx: Continue home PPI  -Lines: Peripheral IV, right subclavian TDC  -Speech eval   -PT/OT when appropriate    VTE Prophylaxis:  Pharmacologic VTE prophylaxis orders are present.      CODE STATUS:   Code Status (Patient has no pulse and is not breathing): CPR (Attempt to Resuscitate)  Medical Interventions (Patient has pulse or is breathing): Full Support    Patient is critically ill in the ICU with septic shock, acute hypoxic respiratory failure in the setting of ESRD on HD. 32 minutes of critical care time was spent managing this patient, excluding procedures. I, Dr. Maya Bernal, spent 32  minutes of critical care time. This included personally reviewing all pertinent labs, imaging, microbiology and documentation. Also discussing the case with  the patient and any available family, the admitting physician and any available ancillary staff. Electronically signed by Maya Bernal MD, 10/30/24, 12:50 PM EDT.

## 2024-10-30 NOTE — PLAN OF CARE
Goal Outcome Evaluation:  Plan of Care Reviewed With: patient        Progress: no change  Outcome Evaluation: Patient is confused and lethargic today. NC at 3L Wife at bedside. Vasopressin turned off this morning. Levophed currently is at 0.08

## 2024-10-30 NOTE — PROGRESS NOTES
RT EQUIPMENT DEVICE RELATED - SKIN ASSESSMENT    RT Medical Equipment/Device:     NIV Mask:  Full-face    size: L    Skin Assessment:      Cheek:  Intact  Chin:  Intact  Neck:  Intact  Nose:  Intact  Lips:  Intact  Mouth:  Intact    Device Skin Pressure Protection:  Positioning supports utilized    Nurse Notification:  Ernestina Conte, RRT

## 2024-10-30 NOTE — PROGRESS NOTES
CARDIOLOY  INPATIENT PROGRESS NOTE         [facility] INTENSIVE CARE UNIT    10/30/2024      PATIENT IDENTIFICATION:   Name:  Pedro Tilley      MRN:  [id number]     78 y.o.  male                 SUBJECTIVE:   Appears to be more alert today.  Continues to be in shock.  Now on 2 with vasopressors.  Levophed 0.06 and vaso at 0.04.  Tolerating dialysis.  Sepsis workup in progress.  Patient is more alert and oriented today.  OBJECTIVE:  Vitals:    10/30/24 1430 10/30/24 1445 10/30/24 1500 10/30/24 1515   BP: 117/44 110/45 90/42 104/43   BP Location:   Left arm    Patient Position:   Lying    Pulse: 75 75 75 75   Resp:   21    Temp:       TempSrc:       SpO2: 93% 92% 92% 94%   Weight:       Height:               Body mass index is 30.99 kg/m².    Intake/Output Summary (Last 24 hours) at 10/30/2024 1550  Last data filed at 10/30/2024 0500  Gross per 24 hour   Intake 966 ml   Output -200 ml   Net 1166 ml       Telemetry: A-fib with V paced rhythm  Physical Exam  Constitutional:  Awake. Not in acute distress. Normal appearance.   Neck: Unable to assess JVD.  Cardiovascular:      Rate and Rhythm: Normal rate and regular rhythm.      Chest Wall: PMI is not displaced.      Heart sounds: Normal heart sounds, S1 normal and S2 normal.   Pulmonary: Pulmonary effort is normal.  Rhonchi auscultated in the lung fields bilaterally.  Extremities: No Bilateral edema is noted.   Skin: Warm and dry. Non cyanotic, No petechiae or rash.   Neurological: Alert and oriented x 3        Allergies   Allergen Reactions    Ketamine Anaphylaxis     Scheduled meds:  arformoterol, 15 mcg, Nebulization, BID - RT  budesonide, 0.5 mg, Nebulization, BID - RT  cefepime, 2,000 mg, Intravenous, Q24H  heparin (porcine), 5,000 Units, Subcutaneous, Q8H  insulin lispro, 2-9 Units, Subcutaneous, 4x Daily AC & at Bedtime  ipratropium-albuterol, 3 mL, Nebulization, 4x Daily - RT  levothyroxine, 125 mcg, Oral, Q AM  midodrine, 5 mg, Oral, TID  "AC  mupirocin, 1 Application, Each Nare, BID  senna-docusate sodium, 2 tablet, Oral, BID  sodium chloride, 10 mL, Intravenous, Q12H      IV meds:                      norepinephrine, 0.02-0.3 mcg/kg/min, Last Rate: 0.06 mcg/kg/min (10/30/24 1037)  Pharmacy to Dose Cefepime,   vasopressin, 0.04 Units/min, Last Rate: Stopped (10/30/24 0915)      Data Review:  CBC          10/28/2024    20:49 10/29/2024    02:26 10/30/2024    03:08   CBC   WBC 12.75  14.27  15.86    RBC 3.74  3.68  3.59    Hemoglobin 10.9  11.0  10.5    Hematocrit 34.9  34.5  33.8    MCV 93.3  93.8  94.2    MCH 29.1  29.9  29.2    MCHC 31.2  31.9  31.1    RDW 16.1  16.0  16.1    Platelets 242  271  292      CMP          10/28/2024    20:49 10/29/2024    02:26 10/29/2024    13:57 10/30/2024    03:08   CMP   Glucose 171  197  184  125    BUN 59  61  51  36    Creatinine 9.74  10.09  8.70  6.53    EGFR 5.1  4.9  5.8  8.2    Sodium 131  133  138  135    Potassium 4.2  4.5  4.6  4.5    Chloride 91  93  99  96    Calcium 8.4  8.5  8.0  8.7    Albumin   3.1     BUN/Creatinine Ratio 6.1  6.0  5.9  5.5    Anion Gap 19.2  19.8  18.9  19.5       CARDIAC LABS:      Lab 10/30/24  0308 10/29/24  0226 10/28/24  2338 10/28/24  2049   PROBNP  --  22,531.0*  --   --    HSTROP T  --   --  117* 119*   PROTIME 18.1* 18.2*  --  17.8*   INR 1.47* 1.48*  --  1.44*        No results found for: \"DIGOXIN\"   No results found for: \"TSH\"        Invalid input(s): \"LDLCALC\"  No results found for: \"POCTROP\"  Lab Results   Component Value Date    TROPONINT 117 (C) 10/28/2024   (  Lab Results   Component Value Date    MG 1.9 10/30/2024     Results for orders placed during the hospital encounter of 10/28/24    Adult Transthoracic Echo Complete w/ Color, Spectral and Contrast if necessary per protocol    Interpretation Summary  Technically difficult study.  All structures were not well-visualized.    LV has normal size.  Moderate concentric hypertrophy is noted.  Systolic function is " preserved.  No regional wall motion abnormalities are noted.  Calculated LVEF is greater than 55%.  Diastolic function cannot be assessed due to underlying rhythm.  Diastolic dysfunction however is present.  Bright echodensity visualized in right ventricle consistent with pacer/ICD wire.  Aortic valve is not well-visualized.  Mitral valve has thickened leaflets.  MAC is present.  Tricuspid and pulmonic valve are not well-visualized.  Trace TR is noted.  IVC is not well-visualized visualized.  Large pericardial effusion is noted.  However no echocardiographic or Doppler signs are noted for hemodynamic compromise.  Compared to echocardiogram from 2/24/2020 LVH is now moderate           ASSESSMENT:  Septic shock  Acute on chronic respiratory failure  COPD with acute exacerbation  ESRD (end stage renal disease)  DM2 (diabetes mellitus, type 2)  History of CAD (coronary artery disease)  Paroxysmal atrial fibrillation  Large pericardial effusion without hemodynamic compromise     Plan:     Patient's echocardiogram was reviewed.  LV systolic function is preserved.  Moderate to large pericardial effusion is present.  Fibrinous material was visualized in pericardial cavity.  It is not uncommon to see pericardial effusion and incisional disease likely from uremia.  Pericardial effusion is likely related to end-stage renal disease.  Pericardiocentesis is not recommended currently  Cannot administer colchicine due to contraindication in dialysis patients.  Sepsis workup per primary team, thus far unremarkable  Continue anticoagulation to prevent stroke risk due to A-fib.  Not able to tolerate p.o. yet.    Anticipating that with dialysis dialysis while on pressors pericardial effusion will resolve.     Cardiology team will follow along.     Thank you for allowing us to share in [facility]. Please call with any questions or concerns.            Fausto Joseph MD  10/30/2024    15:50 EDT

## 2024-10-30 NOTE — PLAN OF CARE
Goal Outcome Evaluation:         FUNCTIONAL ASSESSMENT INSTRUMENT: Patient currently scored a level 1 of 7 on Functional Communication Measures for swallowing indicating a 100% limitation in function.     ASSESSMENT/ PLAN OF CARE:  Pt presents with limitations, noted below, that impede his ability to swallow safely. The skills of a therapist will be required to safely and effectively implement the following treatment plan to restore maximal level of function.     PROBLEMS:  1.  Dysphagia              LTG 1: (30 days) patient will increase ability to tolerate least restrictive diet and improve functional communication measure for swallowing to a 4 of 7              STG 1a: (14 days) patient will tolerate therapeutic trials of thin, nectar, honey thick liquids without clinical sign or symptom of aspiration with 8 of 10 trials.              STG 1b: (14 days) patient will tolerate therapeutic trials of purée and soft solids without clinical sign or symptom of aspiration with 8 of 10 trials.              STG 1c: (14 days) patient/family teaching regarding diet recommendations, safe swallow strategies and signs and symptoms of aspiration.                            TREATMENT: Dysphagia therapy to ensure diet tolerance, advance to least restrictive diet and analyze aspiration risk     FREQUENCY/DURATION: Daily 5 times a week     REHAB POTENTIAL:  Pt has good rehab potential.  The following limitations may influence improvement/ length of tx medical status.     RECOMMENDATIONS:   1.   DIET: Unable to recommend safe p.o. diet at this time.  Continue routine oral care.  Speech therapy will follow-up in the a.m. and reassess.    May benefit from short-term tube feeds until mental status improves     Pt/responsible party agrees with plan of care and has been informed of all alternatives, risks and benefits.     EDUCATION  The patient has been educated in the following areas:   Dysphagia (Swallowing Impairment).

## 2024-10-30 NOTE — PLAN OF CARE
Goal Outcome Evaluation:  Plan of Care Reviewed With: patient        Progress: no change  Outcome Evaluation: Patient wore bipap throughout the night. Patient is currently on 3L NC. Unit is on standby. Patient tolerated breathing treatment and percussion therapy.

## 2024-10-30 NOTE — PROGRESS NOTES
RT EQUIPMENT DEVICE RELATED - SKIN ASSESSMENT    RT Medical Equipment/Device:     NIV Mask:  Full-face    size: L    Skin Assessment:      Cheek:  Intact  Chin:  Intact  Neck:  Intact  Nose:  Intact  Mouth:  Intact    Device Skin Pressure Protection:  Pressure points protected    Nurse Notification:  Ernestina Frye, RRT

## 2024-10-30 NOTE — PLAN OF CARE
Goal Outcome Evaluation:   Patient was reluctant to wear bipap but settled down after mask was placed on patient. Wore entire night @ 12/5 with minimal interruptions.

## 2024-10-30 NOTE — PROGRESS NOTES
[facility]     Nephrology Progress Note      Patient Name: Pedro Tilley  : 1945  MRN: [id number]  Primary Care Physician:  Yessica Sr, WENDY  Date of admission: 10/28/2024    Subjective   Subjective     Interval History:  Patient Reports feeling a little better.  In bed, in ICU, low blood pressure.  On Levophed and vasopressin.  No nausea or vomiting.  From [facility].  Being treated for pneumonia and congestive heart failure.    Review of Systems   All systems were reviewed and negative except for: Was mentioned above.    Objective   Objective     Vitals:   Temp:  [98 °F (36.7 °C)-98.9 °F (37.2 °C)] 98.2 °F (36.8 °C)  Heart Rate:  [] 75  Resp:  [11-27] 20  BP: ()/() 131/52  Flow (L/min) (Oxygen Therapy):  [2-3] 3  Physical Exam:   Constitutional: Awake, alert, no distress.   Eyes: sclerae anicteric, no conjunctival injection   HENT: mucous membranes moist   Neck: Supple, no thyromegaly, no lymphadenopathy, trachea midline, No JVD, right IJ TDC.   Respiratory: Decreased to auscultation bilaterally, nonlabored respirations    Cardiovascular: RRR, no murmurs, rubs, or gallops.   Gastrointestinal: Positive bowel sounds, soft, nontender, nondistended   Musculoskeletal: No edema, no clubbing or cyanosis, right upper extremity AV fistula, patent.   Psychiatric: Appropriate affect, cooperative   Neurologic: Oriented x 3, moving all extremities, Cranial Nerves grossly intact, speech clear   Skin: warm and dry, no rashes     Result Review    Result Reviewed:  I have personally reviewed the results from the time of this admission to 10/30/2024 09:11 EDT and agree with these findings:  [x]  Laboratory  []  Microbiology  [x]  Radiology  []  EKG/Telemetry   []  Cardiology/Vascular   []  Pathology  []  Old records  []  Other:        Lab 10/30/24  0308 10/29/24  1357 10/29/24  0226 10/28/24  2049   SODIUM 135* 138 133* 131*   POTASSIUM 4.5 4.6 4.5 4.2   CHLORIDE 96* 99 93* 91*    CO2 19.5* 20.1* 20.2* 20.8*   BUN 36* 51* 61* 59*   CREATININE 6.53* 8.70* 10.09* 9.74*   GLUCOSE 125* 184* 197* 171*   EGFR 8.2* 5.8* 4.9* 5.1*   ANION GAP 19.5* 18.9* 19.8* 19.2*   MAGNESIUM 1.9  --  2.2 2.0   PHOSPHORUS 6.3*  --  8.2* 8.2*           Most notable findings include: As above.    Assessment & Plan   Assessment / Plan       Active Hospital Problems:  Active Hospital Problems    Diagnosis     **Septic shock     Acute on chronic respiratory failure     CAD (coronary artery disease)     DM2 (diabetes mellitus, type 2)     COPD with acute exacerbation     ESRD (end stage renal disease)        Assessment and Plan:    ESRD-usual dialysis m/w/f at [facility] in [facility] access.  Right upper extremity AV fistula is not ready yet for use.  Dialysis tomorrow, UF 0.5 kg as tolerated.  BP improved on levophed and vasopressin.  Has vascular follow-up with Dr. Montesinos next week.       PNA-treatment per pulmonary.      Sepsis-  likely due to pna.  On levophed and vasopressin now.     CHF-  volume overload but with hypotension limiting UF on dialysis.  2D echo showed normal EF, moderate to large pericardial effusion, being evaluated by cardiology.    Anemia: Secondary to CKD, hemoglobin at goal.    Discussed with wife and ICU team.      Electronically signed by Harika Razo MD, 10/30/2024, 09:11 EDT.

## 2024-10-31 NOTE — PROGRESS NOTES
[facility]   Hospitalist Progress Note  Date: 10/31/2024  Patient Name: Pedro Tilley  : 1945  MRN: [id number]  Date of admission: 10/28/2024  Consultants:   -Pulmonology: Dr. Maya Bernal  -Nephrology: Dr. Nain Wetzel  -Cardiology: Dr. Fausto Joseph    Subjective   Subjective     Chief Complaint: Low blood pressure at dialysis    Summary:   Pedro Tilley is a 78 y.o. male with ESRD on hemodialysis, chronic systolic heart failure that presented to outside facility for dialysis was found to be hypotensive so transferred to [facility] for further evaluation and management.  Initially there was some concern for cardiogenic shock at outlying facility and patient was started on dobutamine, lactic acid was found to be normal and limited echo was obtained that showed moderate pericardial effusion without tamponade.  Cardiology [facility] contacted since lactic acid normal but there was low concern for cardiogenic shock and patient switched due to Levophed.  Pulmonology and nephrology services also consulted to assist in care of the patient.  Broad-spectrum antibiotics started for community-acquired pneumonia.  Echocardiogram was obtained and showed preserved LV systolic function and moderate to large pericardial effusion, fibrinous material visualized in pericardial cavity.  Cardiology noted pericardial effusion likely related to ESRD and pericardiocentesis not recommended at this time.    Interval Followup:   Patient on BiPAP this morning.  Continues on Levophed.  Tmax: 100.4 °F over the last 24 hours (8012-6234).  Increased lethargy today.    Antibiotics:   -Cefepime    Objective   Objective     Vitals:   Temp:  [98 °F (36.7 °C)-100.4 °F (38 °C)] 100.4 °F (38 °C)  Heart Rate:  [59-85] 72  Resp:  [18-28] 21  BP: ()/(33-82) 120/40  Flow (L/min) (Oxygen Therapy):  [3] 3  Physical Exam   Gen: Acutely ill-appearing chronically ill male, lethargic, wearing BiPAP, family at bedside  Resp:  Bilateral rhonchi appreciated, no increased work of breathing, equal chest rise bilaterally  Card: Normal rate, regular rhythm  Abd: Soft, Nontender, Nondistended, + bowel sounds    Result Review    Result Review:  I have personally reviewed the results as below and agree with these findings:  []  Laboratory:   CMP          10/29/2024    02:26 10/29/2024    13:57 10/30/2024    03:08 10/31/2024    02:50   CMP   Glucose 197  184  125  178     178    BUN 61  51  36  53     53    Creatinine 10.09  8.70  6.53  7.76     7.76    EGFR 4.9  5.8  8.2  6.7     6.7    Sodium 133  138  135  133     133    Potassium 4.5  4.6  4.5  5.6     5.6    Chloride 93  99  96  96     96    Calcium 8.5  8.0  8.7  9.1     9.1    Total Protein    6.9    Albumin  3.1   2.5     2.5    Globulin    4.4    Total Bilirubin    2.1    Alkaline Phosphatase    65    AST (SGOT)    22    ALT (SGPT)    10    Albumin/Globulin Ratio    0.6    BUN/Creatinine Ratio 6.0  5.9  5.5  6.8     6.8    Anion Gap 19.8  18.9  19.5  25.4     25.4      CBC          10/29/2024    02:26 10/30/2024    03:08 10/31/2024    02:50   CBC   WBC 14.27  15.86  16.23    RBC 3.68  3.59  3.51    Hemoglobin 11.0  10.5  10.3    Hematocrit 34.5  33.8  34.3    MCV 93.8  94.2  97.7    MCH 29.9  29.2  29.3    MCHC 31.9  31.1  30.0    RDW 16.0  16.1  17.1    Platelets 271  292  309    Phosphorus elevated.  Magnesium within normal limits.  Cortisol: 39.74  [x]  Microbiology: Blood culture (10/29/2024): No growth to date.  Sputum culture (10/29/2024): Klebsiella  [x]  Radiology: CXR imaging personally reviewed.  Cardiomegaly noted.  Moderate left effusion.  Patchy airspace opacities.  [x]  EKG/Telemetry:    []  Cardiology/Vascular:    []  Pathology:  []  Old records:  []  Other:    Assessment & Plan   Assessment / Plan     Assessment:  Community-acquired pneumonia due to Klebsiella  Septic shock secondary to above  Acute on chronic hypoxic respiratory failure  Acute on chronic heart failure  with preserved ejection fraction  Acute COPD exacerbation  Moderate to large pericardial effusion  Volume overload  Elevated proBNP  ESRD on hemodialysis  Hypothyroidism  Obesity (BMI: 30.99)    Plan:  -Pulmonology, Cardiology and Nephrology consulted and following, appreciate assistance and recommendations in the care of this patient.  -Continue Levophed to maintain MAP greater than 65, wean as tolerated  -Continue midodrine  -Continue cefepime  -Continue Brovana, Pulmicort and DuoNebs  -NIPPV therapy as tolerated  -Continue supplemental O2 to maintain sats greater than 90%, wean as tolerated  -Echocardiogram results reviewed as noted above  -Management discussed with nephrology.  Plan for hemodialysis today  -Prognosis: Guarded  -Will monitor electrolytes and renal function with BMP, phosphorus level and magnesium level in the AM  -Will monitor WBC and Hgb with CBC in the AM  -Clinical course will dictate further management     DVT Prophylaxis: Heparin  Diet:   Diet Order   Procedures    NPO Diet NPO Type: Strict NPO     Dispo: Remain in ICU     Pt is critically ill due to septic shock, community-acquired pneumonia due to unknown infectious organism, acute on chronic complex respiratory failure, acute on chronic heart failure reduced ejection fraction, sick acute COPD exacerbation, ESRD on hemodialysis.  Critical care time spent in direct patient care: 36 minutes.  This included high complexity decision making to assess, manipulate and support vital organ system failure in this individual who has impairment of one or more vital organ systems such that there is a high probability of imminent or life threatening deterioration in the patient's condition.  Patient's management was discussed with the patient, her nurse at bedside and the following consultants: Pulmonology, Cardiology and Nephrology.     Part of this note may be an electronic transcription/translation of spoken language to printed text using the Dragon  dictation system.    VTE Prophylaxis:  Pharmacologic VTE prophylaxis orders are present.      CODE STATUS:   Code Status (Patient has no pulse and is not breathing): CPR (Attempt to Resuscitate)  Medical Interventions (Patient has pulse or is breathing): Full Support      Electronically signed by Dillon Langford MD, 10/31/2024, 10:59 EDT.

## 2024-10-31 NOTE — PLAN OF CARE
Goal Outcome Evaluation:  Plan of Care Reviewed With: patient        Progress: no change  Outcome Evaluation: Patient wore bipap throughout the night. Current settings are 12/5 28%. Patient remains lethargic. Tolerated breathing treatment.

## 2024-10-31 NOTE — PROGRESS NOTES
Pulmonary / Critical Care Progress Note      Patient Name: Pedro Tilley  : 1945  MRN: [id number]  Attending:  Dillon Langford MD   Date of admission: 10/28/2024    Subjective   Subjective   Patient critically ill with shock    Over past 24 hours:  No acute events overnight  Globin more lethargic today  Tolerated BiPAP overnight  Norepinephrine at 0.1  Intermittently following command  Sputum positive for gram-negative bacilli  Likely will get HD today    Review of Systems  Unable to obtain      Objective   Objective     Vitals:   Vital signs for last 24 hours:  Temp:  [98 °F (36.7 °C)-100.4 °F (38 °C)] 99.6 °F (37.6 °C)  Heart Rate:  [59-85] 72  Resp:  [20-28] 24  BP: ()/(33-82) 106/49    Intake/Output last 3 shifts:  I/O last 3 completed shifts:  In: 879.2 [I.V.:779.2; IV Piggyback:100]  Out: -   Intake/Output this shift:  No intake/output data recorded.    Vent settings for last 24 hours:       Hemodynamic parameters for last 24 hours:       Physical Exam   Vital Signs Reviewed   General: Elderly male, lying in bed, lethargic  HEENT:  PERRL, EOMI.  OP  Chest:  Clear to auscultation bilaterally, no work of breathing noted on BiPAP  CV: RRR, no MGR, pulses 2+, equal.  Abd:  Soft, NT, ND, + BS, overweight  EXT:  no clubbing, no cyanosis, no edema  Neuro:  A&Ox1, intermittently following commands, CN grossly intact, no focal deficits.  Skin: No rashes or lesions noted    Result Review    Result Review:  I have personally reviewed the results from the time of this admission to 10/31/2024 12:13 EDT and agree with these findings:  []  Laboratory  []  Microbiology  []  Radiology  []  EKG/Telemetry   []  Cardiology/Vascular   []  Pathology  []  Old records  []  Other:  Most notable findings include:   -    Assessment & Plan   Assessment / Plan     Active Hospital Problems:  Active Hospital Problems    Diagnosis     **Septic shock     Acute on chronic respiratory failure     CAD (coronary artery disease)      DM2 (diabetes mellitus, type 2)     COPD with acute exacerbation     ESRD (end stage renal disease)          Impression:  Septic shock  Acute hypoxic respiratory failure  Metabolic acidosis  Multifocal pneumonia  Gram-negative bacilli positive on sputum  Uremia  Volume overload  Pericardial effusion without tamponade physiology  Elevated proBNP  Community-acquired pneumonia, unspecified organism  Lactic acidosis  ESRD on HD     Plan:  -Currently on BiPAP; 3 L during the day  -Repeat chest x-ray today  -Pressors: Continue norepinephrine to keep MAP greater than 65  -Continue midodrine 5 mg 3 times daily  -Cont aspiration precautions. Keep HOB 30 deg.   -Continue cefepime for gram negative bacilli positive on sputum  -De-escalate based on cultures  -Continue with airway clearance  -Continue nebs and bronchopulmonary hygiene  -Little bit more lethargic.  Hopefully patient will perk up after dialysis.  -Will check ammonia, cortisol level, TSH  -2D echo with EF greater than 55% with diastolic dysfunction.  There is a large pericardial effusion without tamponade physiology.  -Cardiology aware, appreciate assistance  -ESRD on HD per nephrology.  Patient has a right subclavian TDC.  Likely HD today.  -Replace electrolytes PRN to keep K 4.0, Mag 2.0, Phos 4.0.  -Keep glucose 140-180 while in ICU. Cont SSI.  -Transfuse to keep Hgb >7  -Continue home levothyroxine  -DVT ppx: Subcutaneous heparin  -GI ppx: Continue home PPI  -Lines: Peripheral IV, right subclavian TDC  -Speech eval   -PT/OT when appropriate    VTE Prophylaxis:  Pharmacologic VTE prophylaxis orders are present.      CODE STATUS:   Medical Intervention Limits: No intubation (DNI)  Code Status (Patient has no pulse and is not breathing): No CPR (Do Not Attempt to Resuscitate)  Medical Interventions (Patient has pulse or is breathing): Limited Support    Patient is critically ill in the ICU with septic shock, acute hypoxic respiratory failure, encephalopathy,  gram-negative bacilli pneumonia in the setting of ESRD on HD. 33 minutes of critical care time was spent managing this patient, excluding procedures. I, Dr. Maya Bernal, spent 33  minutes of critical care time. This included personally reviewing all pertinent labs, imaging, microbiology and documentation. Also discussing the case with the patient and any available family, the admitting physician and any available ancillary staff. Electronically signed by Maya Bernal MD, 10/31/24, 12:16 PM EDT.

## 2024-10-31 NOTE — PROGRESS NOTES
RT EQUIPMENT DEVICE RELATED - SKIN ASSESSMENT    RT Medical Equipment/Device:     NIV Mask:  Full-face    size: L    Skin Assessment:      Cheek:  Intact  Chin:  Intact  Nose:  Intact  Lips:  Intact    Device Skin Pressure Protection:  Pressure points protected    Nurse Notification:  Ernestina Frye, RRT

## 2024-10-31 NOTE — PROGRESS NOTES
CARDIOLOY  INPATIENT PROGRESS NOTE         [facility] INTENSIVE CARE UNIT    10/31/2024      PATIENT IDENTIFICATION:   Name:  Pedro Tilley      MRN:  [id number]     78 y.o.  male                 SUBJECTIVE:   Appears to be more lethargic today.  Continues to be in shock.  Currently on Levophed only.  Vasopressin has been off for more than 12 hours.    Sepsis workup in progress.  OBJECTIVE:  Vitals:    10/31/24 0716 10/31/24 0720 10/31/24 0730 10/31/24 0800   BP: 106/72  94/46 120/40   BP Location:   Left arm Left arm   Patient Position: Lying  Lying Lying   Pulse: 75 59 75 72   Resp: 27 27 20 21   Temp:    100.4 °F (38 °C)   TempSrc:    Rectal   SpO2: 95% 95% 95% 94%   Weight:       Height:               Body mass index is 30.99 kg/m².    Intake/Output Summary (Last 24 hours) at 10/31/2024 0831  Last data filed at 10/31/2024 0700  Gross per 24 hour   Intake 581 ml   Output --   Net 581 ml       Telemetry: A-fib with V paced rhythm  Physical Exam  Constitutional: Lethargic   neck: Unable to assess JVD.  Cardiovascular:      Rate and Rhythm: Normal rate and regular rhythm.   Pulmonary: Pulmonary effort is normal.  Rhonchi auscultated in the lung fields bilaterally.  Extremities: Mild bilateral edema is noted.   Skin: Warm and dry. Non cyanotic, No petechiae or rash.   Neurological: Alert and oriented x 3        Allergies   Allergen Reactions    Ketamine Anaphylaxis     Scheduled meds:  arformoterol, 15 mcg, Nebulization, BID - RT  budesonide, 0.5 mg, Nebulization, BID - RT  cefepime, 2,000 mg, Intravenous, Q24H  heparin (porcine), 5,000 Units, Subcutaneous, Q8H  insulin lispro, 2-9 Units, Subcutaneous, 4x Daily AC & at Bedtime  ipratropium-albuterol, 3 mL, Nebulization, 4x Daily - RT  levothyroxine, 125 mcg, Oral, Q AM  midodrine, 5 mg, Oral, TID AC  mupirocin, 1 Application, Each Nare, BID  senna-docusate sodium, 2 tablet, Oral, BID  sodium chloride, 10 mL, Intravenous, Q12H      IV meds:                  "     norepinephrine, 0.02-0.3 mcg/kg/min, Last Rate: 0.1 mcg/kg/min (10/31/24 0218)  Pharmacy to Dose Cefepime,   vasopressin, 0.04 Units/min, Last Rate: Stopped (10/30/24 0915)      Data Review:  CBC          10/29/2024    02:26 10/30/2024    03:08 10/31/2024    02:50   CBC   WBC 14.27  15.86  16.23    RBC 3.68  3.59  3.51    Hemoglobin 11.0  10.5  10.3    Hematocrit 34.5  33.8  34.3    MCV 93.8  94.2  97.7    MCH 29.9  29.2  29.3    MCHC 31.9  31.1  30.0    RDW 16.0  16.1  17.1    Platelets 271  292  309      CMP          10/29/2024    02:26 10/29/2024    13:57 10/30/2024    03:08 10/31/2024    02:50   CMP   Glucose 197  184  125  178    BUN 61  51  36  53    Creatinine 10.09  8.70  6.53  7.76    EGFR 4.9  5.8  8.2  6.7    Sodium 133  138  135  133    Potassium 4.5  4.6  4.5  5.6    Chloride 93  99  96  96    Calcium 8.5  8.0  8.7  9.1    Total Protein    6.9    Albumin  3.1   2.5    Globulin    4.4    Total Bilirubin    2.1    Alkaline Phosphatase    65    AST (SGOT)    22    ALT (SGPT)    10    Albumin/Globulin Ratio    0.6    BUN/Creatinine Ratio 6.0  5.9  5.5  6.8    Anion Gap 19.8  18.9  19.5  25.4       CARDIAC LABS:      Lab 10/30/24  0308 10/29/24  0226 10/28/24  2338 10/28/24  2049   PROBNP  --  22,531.0*  --   --    HSTROP T  --   --  117* 119*   PROTIME 18.1* 18.2*  --  17.8*   INR 1.47* 1.48*  --  1.44*        No results found for: \"DIGOXIN\"   No results found for: \"TSH\"        Invalid input(s): \"LDLCALC\"  No results found for: \"POCTROP\"  Lab Results   Component Value Date    TROPONINT 117 (C) 10/28/2024   (  Lab Results   Component Value Date    MG 2.2 10/31/2024     Results for orders placed during the hospital encounter of 10/28/24    Adult Transthoracic Echo Complete w/ Color, Spectral and Contrast if necessary per protocol    Interpretation Summary  Technically difficult study.  All structures were not well-visualized.    LV has normal size.  Moderate concentric hypertrophy is noted.  Systolic " function is preserved.  No regional wall motion abnormalities are noted.  Calculated LVEF is greater than 55%.  Diastolic function cannot be assessed due to underlying rhythm.  Diastolic dysfunction however is present.  Bright echodensity visualized in right ventricle consistent with pacer/ICD wire.  Aortic valve is not well-visualized.  Mitral valve has thickened leaflets.  MAC is present.  Tricuspid and pulmonic valve are not well-visualized.  Trace TR is noted.  IVC is not well-visualized visualized.  Large pericardial effusion is noted.  However no echocardiographic or Doppler signs are noted for hemodynamic compromise.  Compared to echocardiogram from 2/24/2020 LVH is now moderate           ASSESSMENT:  Septic shock  Acute on chronic respiratory failure  COPD with acute exacerbation  ESRD (end stage renal disease)  DM2 (diabetes mellitus, type 2)  History of CAD (coronary artery disease)  Paroxysmal atrial fibrillation  Large pericardial effusion without hemodynamic compromise     Plan:  Pressor requirements are going down.  Not on vasopressin for more than 12 hours.  Blood cultures have been negative thus far, respiratory panel has been negative thus far.  Patient's echocardiogram was reviewed.  LV systolic function is preserved.  Moderate to large pericardial effusion is present.  Fibrinous material was visualized in pericardial cavity.  It is not uncommon to see pericardial effusion and incisional disease likely from uremia.  Pericardial effusion is likely related to end-stage renal disease.  Pericardiocentesis is not recommended currently  Cannot administer colchicine due to contraindication in dialysis patients.    Sepsis workup per primary team, thus far unremarkable  Continue anticoagulation to prevent stroke risk due to A-fib.  Not able to tolerate p.o. yet.    Anticipating that with dialysis while on pressors pericardial effusion will resolve.  Prognosis is guarded    Cardiology team will follow  along.     Thank you for allowing us to share in [facility]. Please call with any questions or concerns.            Fausto Joseph MD  10/31/2024    08:31 EDT

## 2024-10-31 NOTE — PROGRESS NOTES
......RT EQUIPMENT DEVICE RELATED - SKIN ASSESSMENT    RT Medical Equipment/Device:     NIV Mask:  Full-face    size: l    Skin Assessment:      Cheek:  Intact  Chin:  Intact  Neck:  Intact  Nose:  Intact  Lips:  Intact  Mouth:  Intact    Device Skin Pressure Protection:  Positioning supports utilized    Nurse Notification:  Ernestina Conte, RRT

## 2024-10-31 NOTE — NURSING NOTE
Duration of Treatment 3.5 Hours   Access Site Tunneled Dialysis Catheter   Dialyzer Revaclear    mL/min   Dialysate Temperature (C) 36   BFR-As tolerated to a maximum of: 400 mL/min   Dialysate Solution Bath: K+ = 2 mEq, Ca = 2.5mEq   Bicarb Other   Bicarb Comments 37   Na+ 137 meq   Fluid Removal: 0.5kg     Removed: 500ml  BLP: 84  Time:3.5hr

## 2024-10-31 NOTE — PROGRESS NOTES
[facility]     Nephrology Progress Note      Patient Name: Pedro Tilley  : 1945  MRN: [id number]  Primary Care Physician:  Yessica Sr, WENDY  Date of admission: 10/28/2024    Subjective   Subjective     Interval History:   In bed, in ICU, low blood pressure.  On Levophed.  No nausea or vomiting.  From [facility].  Being treated for pneumonia and congestive heart failure.    Review of Systems   All systems were reviewed and negative except for: Was mentioned above.    Objective   Objective     Vitals:   Temp:  [98 °F (36.7 °C)-100.4 °F (38 °C)] 100.4 °F (38 °C)  Heart Rate:  [59-85] 72  Resp:  [18-28] 21  BP: ()/() 120/40  Flow (L/min) (Oxygen Therapy):  [3] 3  Physical Exam:   Constitutional: Awake, somnolent   Eyes: sclerae anicteric, no conjunctival injection   HENT: mucous membranes moist   Neck: Supple, no thyromegaly, no lymphadenopathy, trachea midline, No JVD, right IJ TDC.   Respiratory: Decreased to auscultation bilaterally, nonlabored respirations    Cardiovascular: RRR, no murmurs, rubs, or gallops.   Gastrointestinal: Positive bowel sounds, soft, nontender, nondistended   Musculoskeletal: No edema, no clubbing or cyanosis, right upper extremity AV fistula, patent.   Psychiatric: Appropriate affect, cooperative   Neurologic: Oriented x 3, moving all extremities, Cranial Nerves grossly intact, speech clear   Skin: warm and dry, no rashes     Result Review    Result Reviewed:  I have personally reviewed the results from the time of this admission to 10/31/2024 09:35 EDT and agree with these findings:  [x]  Laboratory  []  Microbiology  [x]  Radiology  []  EKG/Telemetry   []  Cardiology/Vascular   []  Pathology  []  Old records  []  Other:        Lab 10/31/24  0250 10/30/24  0308 10/29/24  1357 10/29/24  0226 10/28/24  2049   SODIUM 133*  133* 135* 138 133* 131*   POTASSIUM 5.6*  5.6* 4.5 4.6 4.5 4.2   CHLORIDE 96*  96* 96* 99 93* 91*   CO2 11.6*  11.6* 19.5*  20.1* 20.2* 20.8*   BUN 53*  53* 36* 51* 61* 59*   CREATININE 7.76*  7.76* 6.53* 8.70* 10.09* 9.74*   GLUCOSE 178*  178* 125* 184* 197* 171*   EGFR 6.7*  6.7* 8.2* 5.8* 4.9* 5.1*   ANION GAP 25.4*  25.4* 19.5* 18.9* 19.8* 19.2*   MAGNESIUM 2.2 1.9  --  2.2 2.0   PHOSPHORUS 8.2*  8.2* 6.3*  --  8.2* 8.2*           Assessment & Plan   Assessment / Plan       Active Hospital Problems:  Active Hospital Problems    Diagnosis     **Septic shock     Acute on chronic respiratory failure     CAD (coronary artery disease)     DM2 (diabetes mellitus, type 2)     COPD with acute exacerbation     ESRD (end stage renal disease)        Assessment and Plan:    ESRD-usual dialysis m/w/f at [facility] in [facility] access.  Right upper extremity AV fistula is not ready yet for use.  Dialysis today, UF 0.5 kg as tolerated.  BP improved on levophed and vasopressin.  Has vascular follow-up with Dr. Montesinos next week.       PNA-treatment per pulmonary.      Sepsis-  cultures so far negative.  On levophed now.  Cortisol was normal.  Increase midodrine from 5mg to 10mg tid.     CHF-  volume overload but with hypotension limiting UF on dialysis.  2D echo showed normal EF, moderate to large pericardial effusion, being evaluated by cardiology.  Has been compliant with most dialysis treatments and getting regular dialysis I would think uremia as cause less likely as etiology.  Has had chronic volume overload due to hypotension and not able to get fluid off with dialysis.    Anemia: Secondary to CKD, hemoglobin at goal.

## 2024-10-31 NOTE — CONSULTS
Purpose of the visit was to evaluate for: goals of care/advanced care planning. Spoke with MD, RN, and family and discussed palliative care, goals of care, care options, resuscitation status, [facility], advanced care planning, and clarify code status.      Assessment: Mr. Tilley ESRD on hemodialysis, chronic systolic heart failure that presented to outside facility for dialysis was found to be hypotensive so transferred to [facility] for further evaluation and management. Initially there was some concern for cardiogenic shock at outlying facility and patient was started on dobutamine, lactic acid was found to be normal and limited echo was obtained that showed moderate pericardial effusion without tamponade. Pt in bed with eyes closed. Wife and daughter at bedside. Moved to waiting room and had good conversation about pt status, wishes and goal. Family very realistic and they would like to give pt a little bit of time to see if changes occur but understand that prognosis is poor and if it should come to the point to make decisions they verbalized wanting to get him home. Discussed [facility] and comfort care. Verbalized understanding. Will follow up with family and pt in the morning and talk with primary RN to see about pt status. Pt would be appropriate for [facility] consult regardless of status and will discuss this further with family tomorrow as spouse was realistic but still processing everything. CODE STATUS changed DNR/DNI. Primary Rn and MD aware. Palliative care will continue to follow.      Recommendations/Plan:  Pending clinical course .    Tasks Completed: Code Status clarification and Emotional Support.      Carol Ann GREEN RN  Palliative Care

## 2024-10-31 NOTE — PLAN OF CARE
Goal Outcome Evaluation:   Patient was placed on bipap @ 12/5 28%, and has been very compliant all night. Patient is less responsive than previous shift but vital signs remain stable.

## 2024-11-01 NOTE — PROGRESS NOTES
CARDIOLOY  INPATIENT PROGRESS NOTE         [facility] INTENSIVE CARE UNIT    11/1/2024      PATIENT IDENTIFICATION:   Name:  Pedro Tilley      MRN:  [id number]     78 y.o.  male                 SUBJECTIVE:   Appears to be more lethargic today.  Continues to be in shock.  Currently on Levophed only.    OBJECTIVE:  Vitals:    11/01/24 0730 11/01/24 0744 11/01/24 0745 11/01/24 0800   BP: 123/67   124/72   BP Location:    Left arm   Patient Position:    Lying   Pulse: 75  75 75   Resp:  20  21   Temp: 100 °F (37.8 °C)  100 °F (37.8 °C) 100 °F (37.8 °C)   TempSrc:    Rectal   SpO2: 96%  97% 95%   Weight:       Height:               Body mass index is 30.99 kg/m².    Intake/Output Summary (Last 24 hours) at 11/1/2024 0820  Last data filed at 11/1/2024 0600  Gross per 24 hour   Intake 778 ml   Output 500 ml   Net 278 ml       Telemetry: A-fib with V paced rhythm  Physical Exam  Constitutional: Lethargic   neck: Unable to assess JVD.  Cardiovascular:      Rate and Rhythm: Normal rate and regular rhythm.   Pulmonary: Pulmonary effort is normal.  Rhonchi auscultated in the lung fields bilaterally.  Extremities: Mild bilateral edema is noted.   Skin: Warm and dry. Non cyanotic, No petechiae or rash.   Neurological: Very lethargic         Allergies   Allergen Reactions    Ketamine Anaphylaxis     Scheduled meds:  arformoterol, 15 mcg, Nebulization, BID - RT  budesonide, 0.5 mg, Nebulization, BID - RT  cefepime, 2,000 mg, Intravenous, Q24H  heparin (porcine), 5,000 Units, Subcutaneous, Q8H  insulin lispro, 2-9 Units, Subcutaneous, 4x Daily AC & at Bedtime  ipratropium-albuterol, 3 mL, Nebulization, 4x Daily - RT  levothyroxine, 125 mcg, Oral, Q AM  midodrine, 10 mg, Oral, TID AC  mupirocin, 1 Application, Each Nare, BID  senna-docusate sodium, 2 tablet, Oral, BID  sodium chloride, 10 mL, Intravenous, Q12H      IV meds:                      norepinephrine, 0.02-0.3 mcg/kg/min, Last Rate: 0.08 mcg/kg/min (11/01/24  "0800)      Data Review:  CBC          10/30/2024    03:08 10/31/2024    02:50 11/1/2024    03:19   CBC   WBC 15.86  16.23  14.52    RBC 3.59  3.51  3.58    Hemoglobin 10.5  10.3  10.6    Hematocrit 33.8  34.3  33.2    MCV 94.2  97.7  92.7    MCH 29.2  29.3  29.6    MCHC 31.1  30.0  31.9    RDW 16.1  17.1  16.7    Platelets 292  309  298      CMP          10/30/2024    03:08 10/31/2024    02:50 11/1/2024    03:19   CMP   Glucose 125  178     178  146    BUN 36  53     53  36    Creatinine 6.53  7.76     7.76  5.25    EGFR 8.2  6.7     6.7  10.7    Sodium 135  133     133  136    Potassium 4.5  5.6     5.6  4.1    Chloride 96  96     96  97    Calcium 8.7  9.1     9.1  9.0    Total Protein  6.9  6.6    Albumin  2.5     2.5  2.7    Globulin  4.4  3.9    Total Bilirubin  2.1  1.8    Alkaline Phosphatase  65  68    AST (SGOT)  22  25    ALT (SGPT)  10  11    Albumin/Globulin Ratio  0.6  0.7    BUN/Creatinine Ratio 5.5  6.8     6.8  6.9    Anion Gap 19.5  25.4     25.4  19.0       CARDIAC LABS:      Lab 10/30/24  0308 10/29/24  0226 10/28/24  2338 10/28/24  2049   PROBNP  --  22,531.0*  --   --    HSTROP T  --   --  117* 119*   PROTIME 18.1* 18.2*  --  17.8*   INR 1.47* 1.48*  --  1.44*        No results found for: \"DIGOXIN\"   Lab Results   Component Value Date    TSH 2.320 10/31/2024           Invalid input(s): \"LDLCALC\"  No results found for: \"POCTROP\"  Lab Results   Component Value Date    TROPONINT 117 (C) 10/28/2024   (  Lab Results   Component Value Date    MG 2.0 11/01/2024     Results for orders placed during the hospital encounter of 10/28/24    Adult Transthoracic Echo Complete w/ Color, Spectral and Contrast if necessary per protocol    Interpretation Summary  Technically difficult study.  All structures were not well-visualized.    LV has normal size.  Moderate concentric hypertrophy is noted.  Systolic function is preserved.  No regional wall motion abnormalities are noted.  Calculated LVEF is greater than " 55%.  Diastolic function cannot be assessed due to underlying rhythm.  Diastolic dysfunction however is present.  Bright echodensity visualized in right ventricle consistent with pacer/ICD wire.  Aortic valve is not well-visualized.  Mitral valve has thickened leaflets.  MAC is present.  Tricuspid and pulmonic valve are not well-visualized.  Trace TR is noted.  IVC is not well-visualized visualized.  Large pericardial effusion is noted.  However no echocardiographic or Doppler signs are noted for hemodynamic compromise.  Compared to echocardiogram from 2/24/2020 LVH is now moderate           ASSESSMENT:  Septic shock  Acute on chronic respiratory failure  COPD with acute exacerbation  ESRD (end stage renal disease)  DM2 (diabetes mellitus, type 2)  History of CAD (coronary artery disease)  Paroxysmal atrial fibrillation  Large pericardial effusion without hemodynamic compromise     Plan:  Pressor requirements have been steady for last 24 hours.  Continues to be on Levophed.       Moderate to large pericardial effusion is present.  Fibrinous material was visualized in pericardial cavity.  It is not uncommon to see pericardial effusion and incisional disease likely from uremia.  Pericardial effusion is likely related to end-stage renal disease.  Pericardiocentesis is not recommended currently  Cannot administer colchicine due to contraindication in dialysis patients.    Sepsis management per pulmonology team.  Continue anticoagulation to prevent stroke risk due to A-fib.  Not able to tolerate p.o. yet.    Anticipating that with dialysis while on pressors pericardial effusion will resolve.  Prognosis is guarded    Cardiology team will sign off.  Please feel free to call us with question           Fausto Joseph MD  11/1/2024    08:20 EDT

## 2024-11-01 NOTE — NURSING NOTE
Rounded on pt in morning and family was not at bedside. Primary RN messaged that family was at bedside. Went to speak with Spouse and daughter who mentioned they had just spoke with MD about coretrak and are agreeable and that they would like to give pt weekend to see if he is able to have any improvements. Provided emotional support. Family remains realistic in expectations and understanding and verbalized appreciation on MD discussion giving them a good time frame to make a decision. Will follow up with family on Monday to see pt status and families wishes. Discussed with primary RN. Palliative care will continue to follow.      Carol Ann GREEN RN  Palliative Care

## 2024-11-01 NOTE — PROGRESS NOTES
[facility]     Nephrology Progress Note      Patient Name: Pedro Tilley  : 1945  MRN: [id number]  Primary Care Physician:  Yessica Sr, WENDY  Date of admission: 10/28/2024    Subjective   Subjective     Interval History:   In bed, in ICU, low blood pressure.  On Levophed.  No nausea or vomiting.  From [facility].  Being treated for pneumonia and congestive heart failure.  On CPAP during sleep.    Review of Systems   All systems were reviewed and negative except for: Was mentioned above.    Objective   Objective     Vitals:   Temp:  [99.1 °F (37.3 °C)-100.6 °F (38.1 °C)] 99.5 °F (37.5 °C)  Heart Rate:  [75-82] 75  Resp:  [16-28] 16  BP: ()/(40-97) 114/48  Flow (L/min) (Oxygen Therapy):  [6-8] 6  Physical Exam:   Constitutional: Awake, somnolent, on CPAP.   Eyes: sclerae anicteric, no conjunctival injection   HENT: mucous membranes moist   Neck: Supple, no thyromegaly, no lymphadenopathy, trachea midline, No JVD, right IJ TDC.   Respiratory: Decreased to auscultation bilaterally, nonlabored respirations    Cardiovascular: RRR, no murmurs, rubs, or gallops.   Gastrointestinal: Positive bowel sounds, soft, nontender, nondistended   Musculoskeletal: No edema, no clubbing or cyanosis, right upper extremity AV fistula, patent.   Psychiatric: Appropriate affect, cooperative   Neurologic: Oriented x 3, moving all extremities, Cranial Nerves grossly intact, speech clear   Skin: warm and dry, no rashes     Result Review    Result Reviewed:  I have personally reviewed the results from the time of this admission to 2024 14:03 EDT and agree with these findings:  [x]  Laboratory  []  Microbiology  [x]  Radiology  []  EKG/Telemetry   []  Cardiology/Vascular   []  Pathology  []  Old records  []  Other:        Lab 24  0319 10/31/24  0250 10/30/24  0308 10/29/24  1357 10/29/24  0226 10/28/24  2049   SODIUM 136 133*  133* 135* 138 133* 131*   POTASSIUM 4.1 5.6*  5.6* 4.5 4.6 4.5 4.2    CHLORIDE 97* 96*  96* 96* 99 93* 91*   CO2 20.0* 11.6*  11.6* 19.5* 20.1* 20.2* 20.8*   BUN 36* 53*  53* 36* 51* 61* 59*   CREATININE 5.25* 7.76*  7.76* 6.53* 8.70* 10.09* 9.74*   GLUCOSE 146* 178*  178* 125* 184* 197* 171*   EGFR 10.7* 6.7*  6.7* 8.2* 5.8* 4.9* 5.1*   ANION GAP 19.0* 25.4*  25.4* 19.5* 18.9* 19.8* 19.2*   MAGNESIUM 2.0 2.2 1.9  --  2.2 2.0   PHOSPHORUS 4.9* 8.2*  8.2* 6.3*  --  8.2* 8.2*           Assessment & Plan   Assessment / Plan       Active Hospital Problems:  Active Hospital Problems    Diagnosis     **Septic shock     Acute on chronic respiratory failure     CAD (coronary artery disease)     DM2 (diabetes mellitus, type 2)     COPD with acute exacerbation     ESRD (end stage renal disease)        Assessment and Plan:    ESRD-usual dialysis m/w/f at [facility] in [place], right IJ TDC access.  RAJESH AVF is not ready yet.  Dialysis tomorrow, no UF until he is off Levophed.  Transition to MWF schedule next week.    Will need vascular follow-up with Dr. Montesinos after discharge.       PNA-treatment per pulmonary.      Sepsis-  cultures so far negative.  On levophed now.  Cortisol was normal.  Increase midodrine from 5mg to 10mg tid.     CHF-  volume overload but with hypotension limiting UF on dialysis.  2D echo showed normal EF, moderate to large pericardial effusion,  per cardiology.  Has been compliant with most dialysis treatments, I would not think uremia is a cause.  Has had chronic volume overload due to hypotension and not able to get fluid off with dialysis.    Anemia: Secondary to CKD, hemoglobin at goal.    Discussed with ICU staff.  Weaning Levophed.  Will follow.

## 2024-11-01 NOTE — PROGRESS NOTES
Pulmonary / Critical Care Progress Note      Patient Name: Pedro Tilley  : 1945  MRN: [id number]  Attending:  Dillon Langford MD   Date of admission: 10/28/2024    Subjective   Subjective   Patient critically ill with shock    Over past 24 hours:  No acute events overnight  Remains weak and lethargic  Tolerating BiPAP  Had a fever of 100.6 overnight  Levo 0.1  Tolerated dialysis yesterday with 500 cc removal    Review of Systems  Unable to obtain      Objective   Objective     Vitals:   Vital signs for last 24 hours:  Temp:  [99.6 °F (37.6 °C)-100.6 °F (38.1 °C)] 100 °F (37.8 °C)  Heart Rate:  [59-75] 75  Resp:  [16-28] 16  BP: ()/(37-95) 117/46    Intake/Output last 3 shifts:  I/O last 3 completed shifts:  In: 581 [I.V.:581]  Out: 500   Intake/Output this shift:  No intake/output data recorded.    Vent settings for last 24 hours:       Hemodynamic parameters for last 24 hours:       Physical Exam   Vital Signs Reviewed   General: Elderly male, lying in bed, lethargic and frail appearing  HEENT:  PERRL, EOMI.  OP  Chest:  Clear to auscultation bilaterally, no work of breathing noted on BiPAP  CV: RRR, no MGR, pulses 2+, equal.  Abd:  Soft, NT, ND, + BS, overweight  EXT:  no clubbing, no cyanosis, no edema  Neuro:  A&Ox1, intermittently following commands, CN grossly intact, no focal deficits.  Skin: No rashes or lesions noted    Result Review    Result Review:  I have personally reviewed the results from the time of this admission to 2024 06:38 EDT and agree with these findings:  []  Laboratory  []  Microbiology  []  Radiology  []  EKG/Telemetry   []  Cardiology/Vascular   []  Pathology  []  Old records  []  Other:  Most notable findings include:   -    Assessment & Plan   Assessment / Plan     Active Hospital Problems:  Active Hospital Problems    Diagnosis     **Septic shock     Acute on chronic respiratory failure     CAD (coronary artery disease)     DM2 (diabetes mellitus, type 2)     COPD  with acute exacerbation     ESRD (end stage renal disease)          Impression:  Septic shock  Acute hypoxic respiratory failure  Metabolic acidosis  Multifocal pneumonia  Klebsiella erogenous positive  Uremia  Volume overload  Pericardial effusion without tamponade physiology  Elevated proBNP  Community-acquired pneumonia, unspecified organism  Lactic acidosis  ESRD on HD     Plan:  -Currently on BiPAP; 3 L during the day  -Repeat chest x-ray 10/31 largely unchanged.  -Pressors: Continue norepinephrine to keep MAP greater than 65  -Continue midodrine 5 mg 3 times daily  -Cont aspiration precautions. Keep HOB 30 deg.   -Continue cefepime for Klebsiella pneumonia  -Continue with airway clearance  -Continue nebs and bronchopulmonary hygiene  -Random cortisol 39.  TSH 2.3.  Ammonia 20.  -2D echo with EF greater than 55% with diastolic dysfunction.  There is a large pericardial effusion without tamponade physiology.  -Cardiology aware, appreciate assistance  -ESRD on HD per nephrology.  Patient has a right subclavian TDC.  HD per them.  -Replace electrolytes PRN to keep K 4.0, Mag 2.0, Phos 4.0.  -Keep glucose 140-180 while in ICU. Cont SSI.  -Transfuse to keep Hgb >7  -Continue home levothyroxine  -DVT ppx: Subcutaneous heparin  -GI ppx: Continue home PPI  -Lines: Peripheral IV, right subclavian TDC  -PT/OT when appropriate  -Recommend palliative consult    VTE Prophylaxis:  Pharmacologic VTE prophylaxis orders are present.      CODE STATUS:   Medical Intervention Limits: No intubation (DNI)  Code Status (Patient has no pulse and is not breathing): No CPR (Do Not Attempt to Resuscitate)  Medical Interventions (Patient has pulse or is breathing): Limited Support    Patient is critically ill in the ICU with septic shock, acute hypoxic respiratory failure, encephalopathy, Klebsiella pneumonia in the setting of ESRD on HD. 32 minutes of critical care time was spent managing this patient, excluding procedures. IDr. Trejo  Dolores, spent 32  minutes of critical care time. This included personally reviewing all pertinent labs, imaging, microbiology and documentation. Also discussing the case with the patient and any available family, the admitting physician and any available ancillary staff. Electronically signed by Maya Bernal MD, 11/01/24, 6:41 AM EDT.

## 2024-11-01 NOTE — CONSULTS
"Nutrition Services    Patient Name: Pedro Tilley  YOB: 1945  MRN: [id number]  Admission date: 10/28/2024      CLINICAL NUTRITION ASSESSMENT      Reason for Assessment  Physician Consult, Tube Feeding Assessment, and LOS     H&P:  Past Medical History:   Diagnosis Date    Arthritis     Cancer     Coronary artery disease     Diabetes mellitus     Dialysis patient     History of transfusion     Hypertension     Kidney disease         Current Problems:   Active Hospital Problems    Diagnosis     **Septic shock     Acute on chronic respiratory failure     CAD (coronary artery disease)     DM2 (diabetes mellitus, type 2)     COPD with acute exacerbation     ESRD (end stage renal disease)         Nutrition/Diet History         Narrative   Pt resting at time of visit and unable to arouse. No family at bedside. MD consult for TF recs. No GI distress noted per pt nurse. Note pt has ESRD on HD. RD to continue to monitor.      Anthropometrics        Current Height, Weight Height: 188 cm (74\")  Weight: 110 kg (241 lb 6.5 oz)   Current BMI Body mass index is 30.99 kg/m².   BMI Classification Normal range   % %   Adjusted Body Weight (ABW)    Weight Hx  Wt Readings from Last 30 Encounters:   10/30/24 0500 110 kg (241 lb 6.5 oz)   10/30/24 0200 110 kg (241 lb 6.5 oz)   10/28/24 2000 110 kg (241 lb 6.5 oz)   09/09/24 0430 112 kg (246 lb 14.6 oz)   09/02/24 2147 117 kg (257 lb 15 oz)   09/02/24 2059 117 kg (257 lb 15 oz)          Wt Change Observation Wt loss noted per EMR. 16# wt loss 1 month; RD to monitor.     Estimated/Assessed Needs  Estimated Needs based on: Current Body Weight       Energy Requirements 19-22 kcal/kg    EST Needs (kcal/day) 6783-1771 kcal/d       Protein Requirements 0.8-1.2 g/kg   EST Daily Needs (g/day)  g/d        Fluid Requirements 1 ml/kcal    Estimated Needs (mL/day) 1767-4949 mL/d      Labs/Medications         Pertinent Labs Reviewed.   Results from last 7 days   Lab Units " "11/01/24  0319 10/31/24  0250 10/30/24  0308   SODIUM mmol/L 136 133*  133* 135*   POTASSIUM mmol/L 4.1 5.6*  5.6* 4.5   CHLORIDE mmol/L 97* 96*  96* 96*   CO2 mmol/L 20.0* 11.6*  11.6* 19.5*   BUN mg/dL 36* 53*  53* 36*   CREATININE mg/dL 5.25* 7.76*  7.76* 6.53*   CALCIUM mg/dL 9.0 9.1  9.1 8.7   BILIRUBIN mg/dL 1.8* 2.1*  --    ALK PHOS U/L 68 65  --    ALT (SGPT) U/L 11 10  --    AST (SGOT) U/L 25 22  --    GLUCOSE mg/dL 146* 178*  178* 125*     Results from last 7 days   Lab Units 11/01/24  0319 10/31/24  0250 10/30/24  0308   MAGNESIUM mg/dL 2.0 2.2 1.9   PHOSPHORUS mg/dL 4.9* 8.2*  8.2* 6.3*   HEMOGLOBIN g/dL 10.6* 10.3* 10.5*   HEMATOCRIT % 33.2* 34.3* 33.8*     COVID19   Date Value Ref Range Status   10/29/2024 Not Detected Not Detected - Ref. Range Final     No results found for: \"HGBA1C\"      Pertinent Medications Reviewed.     Malnutrition Severity Assessment              Nutrition Diagnosis         Nutrition Dx Problem 1 Inadequate oral Intake related to Inability to consume sufficient energy as evidenced by NPO.     Nutrition Intervention           Current Nutrition Orders & Evaluation of Intake       Current PO Diet NPO Diet NPO Type: Strict NPO   Supplement Orders Placed This Encounter      Feeding Tube Insertion - AyalogicraActiwave System           Nutrition Intervention/Prescription        Recommend to continue current diet order   Recommend to initiate Novasource Renal @ 25 mL/hr and adv by 10 mL q6h until goal of 50 mL is met, provides 2200 kcal, 100 g PRO, 781 mL   Recommend 200 mL q3h (1600 mL) FWF   Recommend to keep HOB >30 degrees to prevent aspiration         Medical Nutrition Therapy/Nutrition Education          Learner     Readiness Patient  N/A     Method     Response N/A  N/A     Monitor/Evaluation        Monitor Monitor TF tolerance, diet adv, wt trends, and labs      Nutrition Discharge Plan         To be determined     Electronically signed by:  Yelitza Soto RD  11/01/24 12:18 EDT "

## 2024-11-01 NOTE — PROGRESS NOTES
RT EQUIPMENT DEVICE RELATED - SKIN ASSESSMENT    RT Medical Equipment/Device:     NIV Mask:  Full-face    size: L    Skin Assessment:      Cheek:  Intact  Chin:  Intact  Ears:  Intact  Neck:  Intact  Nose:  Intact  Mouth:  Intact    Device Skin Pressure Protection:  Pressure points protected    Nurse Notification:  Ernestina Arias, CRT

## 2024-11-01 NOTE — PROGRESS NOTES
[facility]   Hospitalist Progress Note  Date: 2024  Patient Name: Pedro Tilley  : 1945  MRN: [id number]  Date of admission: 10/28/2024  Consultants:   -Pulmonology: Dr. Maya Bernal  -Nephrology: Dr. Nain Wetzel  -Cardiology: Dr. Fausto Joseph    Subjective   Subjective     Chief Complaint: Low blood pressure at dialysis    Summary:   Pedro Tilley is a 78 y.o. male with ESRD on hemodialysis, chronic systolic heart failure that presented to outside facility for dialysis was found to be hypotensive so transferred to [facility] for further evaluation and management.  Initially there was some concern for cardiogenic shock at outlying facility and patient was started on dobutamine, lactic acid was found to be normal and limited echo was obtained that showed moderate pericardial effusion without tamponade.  Cardiology [facility] contacted since lactic acid normal but there was low concern for cardiogenic shock and patient switched due to Levophed.  Pulmonology and nephrology services also consulted to assist in care of the patient.  Broad-spectrum antibiotics started for community-acquired pneumonia.  Echocardiogram was obtained and showed preserved LV systolic function and moderate to large pericardial effusion, fibrinous material visualized in pericardial cavity.  Cardiology noted pericardial effusion likely related to ESRD and pericardiocentesis not recommended at this time.    Interval Followup:   Patient remains lethargic this morning.  Still on Levophed.  Tmax: 100.6 °F over the last 24 hours ([id number]).  Patient not answering questions.  Wearing BiPAP.  Did tolerate dialysis.    Antibiotics:   -Cefepime    Objective   Objective     Vitals:   Temp:  [99.9 °F (37.7 °C)-100.6 °F (38.1 °C)] 100 °F (37.8 °C)  Heart Rate:  [75-78] 75  Resp:  [16-28] 24  BP: ()/(40-95) 123/43  Flow (L/min) (Oxygen Therapy):  [8] 8  Physical Exam   Gen: Acutely ill-appearing chronically ill male,  lethargic, wearing BiPAP  Resp: Rhonchi noted bilaterally, normal respiratory effort, equal chest rise bilaterally  Card: Normal rate, regular rhythm  Abd: Soft, Nontender, Nondistended, + bowel sounds    Result Review    Result Review:  I have personally reviewed the results as below and agree with these findings:  []  Laboratory:   CMP          10/30/2024    03:08 10/31/2024    02:50 11/1/2024    03:19   CMP   Glucose 125  178     178  146    BUN 36  53     53  36    Creatinine 6.53  7.76     7.76  5.25    EGFR 8.2  6.7     6.7  10.7    Sodium 135  133     133  136    Potassium 4.5  5.6     5.6  4.1    Chloride 96  96     96  97    Calcium 8.7  9.1     9.1  9.0    Total Protein  6.9  6.6    Albumin  2.5     2.5  2.7    Globulin  4.4  3.9    Total Bilirubin  2.1  1.8    Alkaline Phosphatase  65  68    AST (SGOT)  22  25    ALT (SGPT)  10  11    Albumin/Globulin Ratio  0.6  0.7    BUN/Creatinine Ratio 5.5  6.8     6.8  6.9    Anion Gap 19.5  25.4     25.4  19.0      CBC          10/30/2024    03:08 10/31/2024    02:50 11/1/2024    03:19   CBC   WBC 15.86  16.23  14.52    RBC 3.59  3.51  3.58    Hemoglobin 10.5  10.3  10.6    Hematocrit 33.8  34.3  33.2    MCV 94.2  97.7  92.7    MCH 29.2  29.3  29.6    MCHC 31.1  30.0  31.9    RDW 16.1  17.1  16.7    Platelets 292  309  298    Blood glucose well-controlled.  Phosphorus slightly elevated.  Magnesium within normal limits.  [x]  Microbiology: Blood culture (10/29/2024): No growth to date.  Sputum culture (10/29/2024): Klebsiella  []  Radiology:   [x]  EKG/Telemetry:    []  Cardiology/Vascular:    []  Pathology:  []  Old records:  []  Other:    Assessment & Plan   Assessment / Plan     Assessment:  Community-acquired pneumonia due to Klebsiella  Septic shock secondary to above  Acute on chronic hypoxic respiratory failure  Acute on chronic heart failure with preserved ejection fraction  Acute COPD exacerbation  Moderate to large pericardial effusion  Volume  overload  Elevated proBNP  ESRD on hemodialysis  Hypothyroidism  Obesity (BMI: 30.99)    Plan:  -Pulmonology, Cardiology and Nephrology consulted and following, appreciate assistance and recommendations in the care of this patient.  -Continue Levophed to maintain MAP greater 65, wean as tolerated  -Due to patient's altered mental status he has not been able to receive midodrine.  Once core track is placed we will be able to start midodrine  -Continue cefepime  -Continue Brovana, Pulmicort and DuoNebs  -NIPPV therapy as tolerated  -Continue supplemental O2 to maintain sats greater than 90%, wean as tolerated  -Discussed placement of core track with family and they are agreeable.  Will have core track inserted and initiate tube feeds  -Management discussed with pulmonology.  Patient's remain on BiPAP at this time.  Continue cefepime for Klebsiella pneumonia  -Prognosis: Guarded  -Monitor electrolytes and renal function with BMP, phosphorus level and magnesium level in the AM  -Monitor WBC and Hgb with CBC in the AM  -Clinical course will dictate further management     DVT Prophylaxis: Heparin  Diet:   Diet Order   Procedures    NPO Diet NPO Type: Strict NPO     Dispo: Remain in ICU     Pt is critically ill due to septic shock, community-acquired pneumonia due to unknown infectious organism, acute on chronic complex respiratory failure, acute on chronic heart failure reduced ejection fraction, sick acute COPD exacerbation, ESRD on hemodialysis.  Critical care time spent in direct patient care: 35 minutes.  This included high complexity decision making to assess, manipulate and support vital organ system failure in this individual who has impairment of one or more vital organ systems such that there is a high probability of imminent or life threatening deterioration in the patient's condition.  Patient's management was discussed with the patient, her nurse at bedside and the following consultants: Pulmonology,  Cardiology and Nephrology.     Part of this note may be an electronic transcription/translation of spoken language to printed text using the Dragon dictation system.    VTE Prophylaxis:  Pharmacologic VTE prophylaxis orders are present.      CODE STATUS:   Medical Intervention Limits: No intubation (DNI)  Code Status (Patient has no pulse and is not breathing): No CPR (Do Not Attempt to Resuscitate)  Medical Interventions (Patient has pulse or is breathing): Limited Support      Electronically signed by Dillon Langford MD, 11/1/2024, 11:40 EDT.

## 2024-11-02 NOTE — PROGRESS NOTES
"Nutrition Services    Patient Name: Pedro Tilley  YOB: 1945  MRN: [id number]  Admission date: 10/28/2024    PROGRESS NOTE      Encounter Information: EN Follow Up       PO Diet: NPO Diet NPO Type: Strict NPO   PO Supplements: NPO   PO Intake:  NPO       Current nutrition support: [employer] Renal @ 50mL/hr, flush 200 q3h    Provides: 2200 kcal, 100 g PRO, 2381mL fluid (781 FW + 1600 flush)       Estimated Needs: 2090-2420kcal, 88-132g pro, 2090-2420mL fluid       Nutrition support review: EN infusing at goal rate. Pt hyponatremic, no s/s of intolerance. Getting HD at time of visit. Noted levo increased twice, MAP 73. Pt mental status improved.       Labs (reviewed below): Na 129, BUN 51, Cr 6.52, Phos 5.0        GI Function:  No concerns noted       Nutrition Intervention Updates: Decrease flushes 2/2 hyponatremia:    [employer] Renal @ 50mL/hr, flush 100mL q3h    Provides: 2200 kcal, 100 g PRO, 1581mL fluid (781 FW + 800 flush)     Results from last 7 days   Lab Units 11/02/24  0220 11/01/24  0319 10/31/24  0250   SODIUM mmol/L 129* 136 133*  133*   POTASSIUM mmol/L 3.8 4.1 5.6*  5.6*   CHLORIDE mmol/L 93* 97* 96*  96*   CO2 mmol/L 22.1 20.0* 11.6*  11.6*   BUN mg/dL 51* 36* 53*  53*   CREATININE mg/dL 6.52* 5.25* 7.76*  7.76*   CALCIUM mg/dL 8.8 9.0 9.1  9.1   BILIRUBIN mg/dL 1.4* 1.8* 2.1*   ALK PHOS U/L 87 68 65   ALT (SGPT) U/L 12 11 10   AST (SGOT) U/L 28 25 22   GLUCOSE mg/dL 214* 146* 178*  178*     Results from last 7 days   Lab Units 11/02/24  0220 11/01/24  0319 10/31/24  0250   MAGNESIUM mg/dL 2.0 2.0 2.2   PHOSPHORUS mg/dL 5.0* 4.9* 8.2*  8.2*   HEMOGLOBIN g/dL 10.6* 10.6* 10.3*   HEMATOCRIT % 32.5* 33.2* 34.3*     COVID19   Date Value Ref Range Status   10/29/2024 Not Detected Not Detected - Ref. Range Final     No results found for: \"HGBA1C\"    RD to follow up per protocol.    Electronically signed by:  Rosie Hernandez RD  11/02/24 14:05 EDT   "

## 2024-11-02 NOTE — PROGRESS NOTES
[facility]     Nephrology Progress Note      Patient Name: Pedro Tilley  : 1945  MRN: [id number]  Primary Care Physician:  Yessica Sr, WENDY  Date of admission: 10/28/2024    Subjective   Subjective     Interval History:   In bed, in ICU, low blood pressure.  On Levophed.  No nausea or vomiting.  Being treated for pneumonia and congestive heart failure.  On CPAP during sleep.  Seen on dialysis.    Review of Systems   All systems were reviewed and negative except for: Was mentioned above.    Objective   Objective     Vitals:   Temp:  [90.1 °F (32.3 °C)-100.2 °F (37.9 °C)] 100.2 °F (37.9 °C)  Heart Rate:  [75-82] 75  Resp:  [16-30] 20  BP: ()/() 88/41  Flow (L/min) (Oxygen Therapy):  [6-8] 6  Physical Exam:   Constitutional: Awake, somnolent, on CPAP.   Eyes: sclerae anicteric, no conjunctival injection   HENT: mucous membranes moist   Neck: Supple, no thyromegaly, no lymphadenopathy, trachea midline, No JVD, right IJ TDC.   Respiratory: Decreased to auscultation bilaterally, nonlabored respirations    Cardiovascular: RRR, no murmurs, rubs, or gallops.   Gastrointestinal: Positive bowel sounds, soft, nontender, nondistended   Musculoskeletal: No edema, no clubbing or cyanosis, right upper extremity AV fistula, patent.   Psychiatric: Appropriate affect, cooperative   Neurologic: Oriented x 3, moving all extremities, Cranial Nerves grossly intact, speech clear   Skin: warm and dry, no rashes     Result Review    Result Reviewed:  I have personally reviewed the results from the time of this admission to 2024 09:20 EDT and agree with these findings:  [x]  Laboratory  []  Microbiology  [x]  Radiology  []  EKG/Telemetry   []  Cardiology/Vascular   []  Pathology  []  Old records  []  Other:        Lab 24  0220 24  0319 10/31/24  0250 10/30/24  0308 10/29/24  1357 10/29/24  0226 10/28/24  2049   SODIUM 129* 136 133*  133* 135* 138 133* 131*   POTASSIUM 3.8 4.1 5.6*  5.6*  4.5 4.6 4.5 4.2   CHLORIDE 93* 97* 96*  96* 96* 99 93* 91*   CO2 22.1 20.0* 11.6*  11.6* 19.5* 20.1* 20.2* 20.8*   BUN 51* 36* 53*  53* 36* 51* 61* 59*   CREATININE 6.52* 5.25* 7.76*  7.76* 6.53* 8.70* 10.09* 9.74*   GLUCOSE 214* 146* 178*  178* 125* 184* 197* 171*   EGFR 8.2* 10.7* 6.7*  6.7* 8.2* 5.8* 4.9* 5.1*   ANION GAP 13.9 19.0* 25.4*  25.4* 19.5* 18.9* 19.8* 19.2*   MAGNESIUM 2.0 2.0 2.2 1.9  --  2.2 2.0   PHOSPHORUS 5.0* 4.9* 8.2*  8.2* 6.3*  --  8.2* 8.2*           Assessment & Plan   Assessment / Plan       Active Hospital Problems:  Active Hospital Problems    Diagnosis     **Septic shock     Acute on chronic respiratory failure     CAD (coronary artery disease)     DM2 (diabetes mellitus, type 2)     COPD with acute exacerbation     ESRD (end stage renal disease)        Assessment and Plan:    ESRD-usual dialysis m/w/f at [facility] in [place], right IJ TDC access.  RUE AVF is not ready yet.  Dialysis today.  D/w dialysis nursing, pulm would like to try volume removal.  Unlikely to tolerate much being on levophed.  Will try 1L with dialysis today.  Transition to MWF schedule next week.    Will need vascular follow-up with Dr. Montesinos after discharge.       PNA-treatment per pulmonary.      Sepsis-  cultures so far negative.  On levophed now.  Cortisol was normal.  Increase midodrine from 5mg to 10mg tid.     CHF-  volume overload but with hypotension limiting UF on dialysis.  2D echo showed normal EF, moderate to large pericardial effusion,  per cardiology.  Has been compliant with most dialysis treatments, I would not think uremia is a cause.  Has had chronic volume overload due to hypotension and not able to get fluid off with dialysis.    Anemia: Secondary to CKD, hemoglobin at goal.

## 2024-11-02 NOTE — PROGRESS NOTES
RT EQUIPMENT DEVICE RELATED - SKIN ASSESSMENT    RT Medical Equipment/Device:     NIV Mask:  Full-face    size: l    Skin Assessment:      Cheek:  Intact  Chin:  Intact  Nares:  Intact  Neck:  Intact  Mouth:  Intact    Device Skin Pressure Protection:  Pressure points protected    Nurse Notification:  Ernestina Frye, RRT

## 2024-11-02 NOTE — PLAN OF CARE
Problem: Adult Inpatient Plan of Care  Goal: Readiness for Transition of Care  Outcome: Not Progressing     Problem: Noninvasive Ventilation Acute  Goal: Effective Unassisted Ventilation and Oxygenation  Outcome: Not Progressing     Problem: Comorbidity Management  Goal: Blood Pressure in Desired Range  Outcome: Not Progressing  Intervention: Maintain Blood Pressure Management  Flowsheets (Taken 11/2/2024 0544)  Medication Review/Management: (Pt still dependent on levophed and this RN had to titrate it up overnight.) --     Problem: Hemodialysis  Goal: Absence of Infection Signs and Symptoms  Outcome: Not Progressing  Intervention: Prevent or Manage Infection  Recent Flowsheet Documentation  Taken 11/2/2024 0439 by Dede Art RN  Infection Prevention:   rest/sleep promoted   single patient room provided   personal protective equipment utilized   hand hygiene promoted   environmental surveillance performed  Taken 11/2/2024 0400 by Dede Art RN  Infection Prevention:   rest/sleep promoted   single patient room provided   personal protective equipment utilized   hand hygiene promoted   environmental surveillance performed  Taken 11/2/2024 0300 by Dede Art RN  Infection Prevention:   rest/sleep promoted   single patient room provided   personal protective equipment utilized   hand hygiene promoted   environmental surveillance performed  Taken 11/2/2024 0200 by Dede Art RN  Infection Prevention:   rest/sleep promoted   single patient room provided   personal protective equipment utilized   hand hygiene promoted   environmental surveillance performed  Taken 11/2/2024 0100 by Dede Art RN  Infection Prevention:   rest/sleep promoted   single patient room provided   personal protective equipment utilized   hand hygiene promoted   environmental surveillance performed  Taken 11/2/2024 0000 by Dede Art RN  Infection Prevention:   rest/sleep promoted   single patient room provided   personal  protective equipment utilized   hand hygiene promoted   environmental surveillance performed  Taken 11/1/2024 2352 by Dede Art RN  Infection Prevention:   rest/sleep promoted   single patient room provided   personal protective equipment utilized   hand hygiene promoted   environmental surveillance performed  Taken 11/1/2024 2300 by Dede Art RN  Infection Prevention:   rest/sleep promoted   single patient room provided   personal protective equipment utilized   hand hygiene promoted   environmental surveillance performed  Taken 11/1/2024 2200 by Dede Art RN  Infection Prevention:   rest/sleep promoted   single patient room provided   personal protective equipment utilized   hand hygiene promoted   environmental surveillance performed  Taken 11/1/2024 2100 by Dede Art RN  Infection Prevention:   rest/sleep promoted   single patient room provided   personal protective equipment utilized   hand hygiene promoted   environmental surveillance performed  Taken 11/1/2024 2000 by Dede Art RN  Infection Prevention:   rest/sleep promoted   single patient room provided   personal protective equipment utilized   hand hygiene promoted   environmental surveillance performed  Taken 11/1/2024 1923 by Dede Art RN  Infection Prevention:   rest/sleep promoted   single patient room provided   personal protective equipment utilized   hand hygiene promoted   environmental surveillance performed  Taken 11/1/2024 1900 by Dede Art RN  Infection Prevention:   rest/sleep promoted   single patient room provided   personal protective equipment utilized   hand hygiene promoted   environmental surveillance performed     Problem: Sepsis/Septic Shock  Goal: Optimal Coping  Outcome: Not Progressing  Goal: Absence of Infection Signs and Symptoms  Outcome: Not Progressing  Intervention: Initiate Sepsis Management  Recent Flowsheet Documentation  Taken 11/2/2024 0439 by Dede Art RN  Infection Prevention:    rest/sleep promoted   single patient room provided   personal protective equipment utilized   hand hygiene promoted   environmental surveillance performed  Taken 11/2/2024 0400 by Dede Art RN  Infection Prevention:   rest/sleep promoted   single patient room provided   personal protective equipment utilized   hand hygiene promoted   environmental surveillance performed  Taken 11/2/2024 0300 by Dede Art RN  Infection Prevention:   rest/sleep promoted   single patient room provided   personal protective equipment utilized   hand hygiene promoted   environmental surveillance performed  Taken 11/2/2024 0200 by Dede Art RN  Infection Prevention:   rest/sleep promoted   single patient room provided   personal protective equipment utilized   hand hygiene promoted   environmental surveillance performed  Taken 11/2/2024 0100 by Dede Art RN  Infection Prevention:   rest/sleep promoted   single patient room provided   personal protective equipment utilized   hand hygiene promoted   environmental surveillance performed  Taken 11/2/2024 0000 by Dede Art RN  Infection Prevention:   rest/sleep promoted   single patient room provided   personal protective equipment utilized   hand hygiene promoted   environmental surveillance performed  Taken 11/1/2024 2352 by Dede Art RN  Infection Prevention:   rest/sleep promoted   single patient room provided   personal protective equipment utilized   hand hygiene promoted   environmental surveillance performed  Taken 11/1/2024 2300 by Dede Art RN  Infection Prevention:   rest/sleep promoted   single patient room provided   personal protective equipment utilized   hand hygiene promoted   environmental surveillance performed  Taken 11/1/2024 2200 by Dede Art RN  Infection Prevention:   rest/sleep promoted   single patient room provided   personal protective equipment utilized   hand hygiene promoted   environmental surveillance performed  Taken  11/1/2024 2100 by Dede Art RN  Infection Prevention:   rest/sleep promoted   single patient room provided   personal protective equipment utilized   hand hygiene promoted   environmental surveillance performed  Taken 11/1/2024 2000 by Dede Art RN  Infection Prevention:   rest/sleep promoted   single patient room provided   personal protective equipment utilized   hand hygiene promoted   environmental surveillance performed  Taken 11/1/2024 1923 by Dede Art RN  Infection Prevention:   rest/sleep promoted   single patient room provided   personal protective equipment utilized   hand hygiene promoted   environmental surveillance performed  Taken 11/1/2024 1900 by Dede Art RN  Infection Prevention:   rest/sleep promoted   single patient room provided   personal protective equipment utilized   hand hygiene promoted   environmental surveillance performed  Intervention: Promote Recovery  Recent Flowsheet Documentation  Taken 11/2/2024 0439 by Dede Art RN  Activity Management: (pt moves all four extremities, but not to command.)   bedrest   other (see comments)  Taken 11/2/2024 0200 by Dede Art RN  Activity Management: bedrest  Taken 11/1/2024 2352 by Dede Art RN  Activity Management: bedrest  Taken 11/1/2024 2200 by Dede Art RN  Activity Management: bedrest  Taken 11/1/2024 1923 by Dede Art RN  Activity Management: bedrest  Taken 11/1/2024 1900 by Dede Art RN  Activity Management: bedrest     Problem: Adult Inpatient Plan of Care  Goal: Plan of Care Review  Outcome: Progressing  Flowsheets (Taken 11/2/2024 0542)  Outcome Evaluation:   Patient wore BiPAP entire night except for every two hour oral care   pt tongue coated with dried mucous.  FiO2 was increased overnight from 28 to 30% for desat down to 88%.  Levophed was increased right at change of shift from .06 to 0.1 and at around 0200, this RN had to increase it to 0.12 mcg/kg/min to maintain MAP greater than  65.  Pt followed commands off and on to squeeze this RN's hand, but would not follow any other commands.  Pushed RN away with oral care.  Wife at bedside most of night   family takes turns coming to see pt.  Plan of Care Reviewed With: spouse  Goal: Patient-Specific Goal (Individualized)  Outcome: Progressing  Flowsheets (Taken 11/2/2024 0544)  Patient/Family-Specific Goals (Include Timeframe): Goal for the night: promote rest/sleep and maintain BP on levophed.  Loss of IV site did cause temporary drop in BP, but new IV was started and other IV was trouble-shot and fixed.  Pt lethargic most of night.  Goal: Absence of Hospital-Acquired Illness or Injury  Outcome: Progressing  Intervention: Identify and Manage Fall Risk  Recent Flowsheet Documentation  Taken 11/2/2024 0439 by Dede Art RN  Safety Promotion/Fall Prevention:   safety round/check completed   fall prevention program maintained  Taken 11/2/2024 0400 by Dede Art RN  Safety Promotion/Fall Prevention:   safety round/check completed   fall prevention program maintained  Taken 11/2/2024 0300 by Dede Art RN  Safety Promotion/Fall Prevention:   safety round/check completed   fall prevention program maintained  Taken 11/2/2024 0200 by Dede Art RN  Safety Promotion/Fall Prevention:   safety round/check completed   fall prevention program maintained  Taken 11/2/2024 0100 by Dede Art RN  Safety Promotion/Fall Prevention:   safety round/check completed   fall prevention program maintained  Taken 11/2/2024 0000 by Dede Art RN  Safety Promotion/Fall Prevention:   safety round/check completed   fall prevention program maintained  Taken 11/1/2024 2352 by Dede Art RN  Safety Promotion/Fall Prevention:   safety round/check completed   fall prevention program maintained  Taken 11/1/2024 2300 by Dede Art RN  Safety Promotion/Fall Prevention:   safety round/check completed   fall prevention program maintained  Taken 11/1/2024  2200 by Rubens, Dede, RN  Safety Promotion/Fall Prevention:   safety round/check completed   fall prevention program maintained  Taken 11/1/2024 2100 by Dede Art RN  Safety Promotion/Fall Prevention:   safety round/check completed   fall prevention program maintained  Taken 11/1/2024 2000 by Dede Art RN  Safety Promotion/Fall Prevention:   safety round/check completed   fall prevention program maintained  Taken 11/1/2024 1923 by Dede Art RN  Safety Promotion/Fall Prevention:   safety round/check completed   fall prevention program maintained  Taken 11/1/2024 1900 by Dede Art RN  Safety Promotion/Fall Prevention:   safety round/check completed   fall prevention program maintained  Intervention: Prevent Skin Injury  Recent Flowsheet Documentation  Taken 11/2/2024 0400 by Dede Art RN  Body Position: (heels elevated off bed)   right   turned  Taken 11/2/2024 0200 by Dede Art RN  Body Position: (pillows between legs)   left   turned  Taken 11/1/2024 2327 by Dede Art RN  Body Position: (heels elevated off bed)   right   turned  Taken 11/1/2024 2200 by Dede Art RN  Body Position: (heels elevated off bed)   turned   supine  Taken 11/1/2024 2055 by Dede Art RN  Body Position: (pillow between his knees/legs)   left   turned  Intervention: Prevent Infection  Recent Flowsheet Documentation  Taken 11/2/2024 0439 by Dede Art RN  Infection Prevention:   rest/sleep promoted   single patient room provided   personal protective equipment utilized   hand hygiene promoted   environmental surveillance performed  Taken 11/2/2024 0400 by Dede Art RN  Infection Prevention:   rest/sleep promoted   single patient room provided   personal protective equipment utilized   hand hygiene promoted   environmental surveillance performed  Taken 11/2/2024 0300 by Dede Art RN  Infection Prevention:   rest/sleep promoted   single patient room provided   personal protective  equipment utilized   hand hygiene promoted   environmental surveillance performed  Taken 11/2/2024 0200 by Dede Art RN  Infection Prevention:   rest/sleep promoted   single patient room provided   personal protective equipment utilized   hand hygiene promoted   environmental surveillance performed  Taken 11/2/2024 0100 by Dede Art RN  Infection Prevention:   rest/sleep promoted   single patient room provided   personal protective equipment utilized   hand hygiene promoted   environmental surveillance performed  Taken 11/2/2024 0000 by Dede Art RN  Infection Prevention:   rest/sleep promoted   single patient room provided   personal protective equipment utilized   hand hygiene promoted   environmental surveillance performed  Taken 11/1/2024 2352 by Dede Art RN  Infection Prevention:   rest/sleep promoted   single patient room provided   personal protective equipment utilized   hand hygiene promoted   environmental surveillance performed  Taken 11/1/2024 2300 by Dede Art RN  Infection Prevention:   rest/sleep promoted   single patient room provided   personal protective equipment utilized   hand hygiene promoted   environmental surveillance performed  Taken 11/1/2024 2200 by Dede Art RN  Infection Prevention:   rest/sleep promoted   single patient room provided   personal protective equipment utilized   hand hygiene promoted   environmental surveillance performed  Taken 11/1/2024 2100 by Dede Art RN  Infection Prevention:   rest/sleep promoted   single patient room provided   personal protective equipment utilized   hand hygiene promoted   environmental surveillance performed  Taken 11/1/2024 2000 by Dede Art RN  Infection Prevention:   rest/sleep promoted   single patient room provided   personal protective equipment utilized   hand hygiene promoted   environmental surveillance performed  Taken 11/1/2024 1923 by Dede Art RN  Infection Prevention:   rest/sleep  promoted   single patient room provided   personal protective equipment utilized   hand hygiene promoted   environmental surveillance performed  Taken 11/1/2024 1900 by Dede Art RN  Infection Prevention:   rest/sleep promoted   single patient room provided   personal protective equipment utilized   hand hygiene promoted   environmental surveillance performed  Goal: Optimal Comfort and Wellbeing  Outcome: Progressing     Problem: Skin Injury Risk Increased  Goal: Skin Health and Integrity  Outcome: Progressing  Intervention: Optimize Skin Protection  Recent Flowsheet Documentation  Taken 11/2/2024 0439 by Dede Art RN  Activity Management: (pt moves all four extremities, but not to command.)   bedrest   other (see comments)  Taken 11/2/2024 0400 by Dede Art RN  Head of Bed (HOB) Positioning: HOB at 30 degrees  Taken 11/2/2024 0200 by Dede Art RN  Activity Management: bedrest  Head of Bed (HOB) Positioning: HOB at 30 degrees  Taken 11/1/2024 2352 by Dede Art RN  Activity Management: bedrest  Taken 11/1/2024 2327 by Dede Art RN  Head of Bed (HOB) Positioning: HOB at 30 degrees  Taken 11/1/2024 2200 by Dede Art RN  Activity Management: bedrest  Head of Bed (HOB) Positioning: HOB at 30 degrees  Taken 11/1/2024 2055 by Dede Art RN  Head of Bed (HOB) Positioning: HOB at 30 degrees  Taken 11/1/2024 1923 by Dede Art RN  Activity Management: bedrest  Taken 11/1/2024 1900 by Dede Art RN  Activity Management: bedrest     Problem: Comorbidity Management  Goal: Maintenance of COPD Symptom Control  Outcome: Progressing  Intervention: Maintain COPD (Chronic Obstructive Pulmonary Disease) Symptom Control  Recent Flowsheet Documentation  Taken 11/2/2024 0544 by Dede Art RN  Medication Review/Management: (Pt still dependent on levophed and this RN had to titrate it up overnight.) --  Goal: Blood Glucose Level Within Target Range  Outcome: Progressing  Intervention:  Monitor and Manage Glycemia  Recent Flowsheet Documentation  Taken 11/2/2024 0544 by Dede Art RN  Medication Review/Management: (Pt still dependent on levophed and this RN had to titrate it up overnight.) --     Problem: Fall Injury Risk  Goal: Absence of Fall and Fall-Related Injury  Outcome: Progressing  Intervention: Identify and Manage Contributors  Recent Flowsheet Documentation  Taken 11/2/2024 0544 by Dede Art RN  Medication Review/Management: (Pt still dependent on levophed and this RN had to titrate it up overnight.) --  Intervention: Promote Injury-Free Environment  Recent Flowsheet Documentation  Taken 11/2/2024 0439 by Dede Art RN  Safety Promotion/Fall Prevention:   safety round/check completed   fall prevention program maintained  Taken 11/2/2024 0400 by Dede Art RN  Safety Promotion/Fall Prevention:   safety round/check completed   fall prevention program maintained  Taken 11/2/2024 0300 by Dede Art RN  Safety Promotion/Fall Prevention:   safety round/check completed   fall prevention program maintained  Taken 11/2/2024 0200 by Dede Art RN  Safety Promotion/Fall Prevention:   safety round/check completed   fall prevention program maintained  Taken 11/2/2024 0100 by Dede Art RN  Safety Promotion/Fall Prevention:   safety round/check completed   fall prevention program maintained  Taken 11/2/2024 0000 by Dede Art RN  Safety Promotion/Fall Prevention:   safety round/check completed   fall prevention program maintained  Taken 11/1/2024 2352 by Dede Art RN  Safety Promotion/Fall Prevention:   safety round/check completed   fall prevention program maintained  Taken 11/1/2024 2300 by Dede Art RN  Safety Promotion/Fall Prevention:   safety round/check completed   fall prevention program maintained  Taken 11/1/2024 2200 by Dede Art RN  Safety Promotion/Fall Prevention:   safety round/check completed   fall prevention program maintained  Taken  11/1/2024 2100 by Dede Art RN  Safety Promotion/Fall Prevention:   safety round/check completed   fall prevention program maintained  Taken 11/1/2024 2000 by Dede Art RN  Safety Promotion/Fall Prevention:   safety round/check completed   fall prevention program maintained  Taken 11/1/2024 1923 by Dede Art RN  Safety Promotion/Fall Prevention:   safety round/check completed   fall prevention program maintained  Taken 11/1/2024 1900 by Dede Art RN  Safety Promotion/Fall Prevention:   safety round/check completed   fall prevention program maintained     Problem: Palliative Care  Goal: Enhanced Quality of Life  Outcome: Progressing  Intervention: Maximize Comfort  Flowsheets  Taken 11/2/2024 0544  Oral Care: (oral care done every two hours this shift.) --  Taken 11/2/2024 0439  Oral Care:   lip/mouth moisturizer applied   swabbed with antiseptic solution   tongue brushed  Taken 11/2/2024 0200  Oral Care: (continuing to get a lot of dried mucous off tongue with each mouth care administration)   lip/mouth moisturizer applied   swabbed with antiseptic solution   tongue brushed  Taken 11/1/2024 2200  Oral Care:   swabbed with antiseptic solution   tongue brushed   lip/mouth moisturizer applied  Taken 11/1/2024 1945  Oral Care:   lip/mouth moisturizer applied   swabbed with antiseptic solution   tongue brushed     Problem: Hemodialysis  Goal: Safe, Effective Therapy Delivery  Outcome: Progressing  Intervention: Optimize Device Care and Function  Recent Flowsheet Documentation  Taken 11/2/2024 0544 by Dede Art RN  Medication Review/Management: (Pt still dependent on levophed and this RN had to titrate it up overnight.) --  Goal: Effective Tissue Perfusion  Outcome: Progressing     Problem: Sepsis/Septic Shock  Goal: Absence of Bleeding  Outcome: Progressing  Goal: Blood Glucose Level Within Target Range  Outcome: Progressing  Goal: Optimal Nutrition Delivery  Outcome: Progressing  Intervention:  Optimize Nutrition Delivery  Flowsheets  Taken 11/2/2024 0577  Nutrition Support Management: (tube feeding now close to goal.  Infusing at 45 ml/hr and goal is 50 ml/hr.) tube feeding rate increased  Taken 11/1/2024 2300  Nutrition Support Management: (increased tube feeds to 35 ml/hr) tube feeding rate increased   Goal Outcome Evaluation:  Plan of Care Reviewed With: spouse           Outcome Evaluation: Patient wore BiPAP entire night except for every two hour oral care; pt tongue coated with dried mucous.  FiO2 was increased overnight from 28 to 30% for desat down to 88%.  Levophed was increased right at change of shift from .06 to 0.1 and at around 0200, this RN had to increase it to 0.12 mcg/kg/min to maintain MAP greater than 65.  Pt followed commands off and on to squeeze this RN's hand, but would not follow any other commands.  Pushed RN away with oral care.  Wife at bedside most of night; family takes turns coming to see pt.

## 2024-11-02 NOTE — PLAN OF CARE
Goal Outcome Evaluation:  Plan of Care Reviewed With: patient           Outcome Evaluation: tube feeds maintained, tolerating 3L nasal cannula,

## 2024-11-02 NOTE — PROGRESS NOTES
[facility]   Hospitalist Progress Note  Date: 2024  Patient Name: Pedro Tilley  : 1945  MRN: [id number]  Date of admission: 10/28/2024  Consultants:   -Pulmonology: Dr. Maya Bernal  -Nephrology: Dr. Nain Wetzel  -Cardiology: Dr. Fausto Joseph    Subjective   Subjective     Chief Complaint: Low blood pressure at dialysis    Summary:   Pedro Tilley is a 78 y.o. male with ESRD on hemodialysis, chronic systolic heart failure that presented to outside facility for dialysis was found to be hypotensive so transferred to [facility] for further evaluation and management.  Initially there was some concern for cardiogenic shock at outlying facility and patient was started on dobutamine, lactic acid was found to be normal and limited echo was obtained that showed moderate pericardial effusion without tamponade.  Cardiology [facility] contacted since lactic acid normal but there was low concern for cardiogenic shock and patient switched due to Levophed.  Pulmonology and nephrology services also consulted to assist in care of the patient.  Broad-spectrum antibiotics started for community-acquired pneumonia.  Echocardiogram was obtained and showed preserved LV systolic function and moderate to large pericardial effusion, fibrinous material visualized in pericardial cavity.  Cardiology noted pericardial effusion likely related to ESRD and pericardiocentesis not recommended at this time.    Interval Followup:   Patient still lethargic but did open eyes today on exam.  Continues on Levophed.  Tmax: 100.2 °F over the last 24 hours ([id number]).  Continues on BiPAP.    Antibiotics:   -Cefepime    Objective   Objective     Vitals:   Temp:  [90.1 °F (32.3 °C)-100.2 °F (37.9 °C)] 99.1 °F (37.3 °C)  Heart Rate:  [75-82] 75  Resp:  [16-30] 21  BP: ()/() 117/61  Flow (L/min) (Oxygen Therapy):  [4-6] 4  Physical Exam   Gen: Acutely ill-appearing chronically ill male, wearing BiPAP, opens eyes  but is still lethargic  Resp: Bilateral rhonchi appreciated, equal chest rise bilaterally, no increase in work of breathing  Card: Normal rate, regular rhythm  Abd: Soft, Nontender, Nondistended, + bowel sounds    Result Review    Result Review:  I have personally reviewed the results as below and agree with these findings:  []  Laboratory:   CMP          10/31/2024    02:50 11/1/2024    03:19 11/2/2024    02:20   CMP   Glucose 178     178  146  214    BUN 53     53  36  51    Creatinine 7.76     7.76  5.25  6.52    EGFR 6.7     6.7  10.7  8.2    Sodium 133     133  136  129    Potassium 5.6     5.6  4.1  3.8    Chloride 96     96  97  93    Calcium 9.1     9.1  9.0  8.8    Total Protein 6.9  6.6  6.3    Albumin 2.5     2.5  2.7  2.6    Globulin 4.4  3.9  3.7    Total Bilirubin 2.1  1.8  1.4    Alkaline Phosphatase 65  68  87    AST (SGOT) 22  25  28    ALT (SGPT) 10  11  12    Albumin/Globulin Ratio 0.6  0.7  0.7    BUN/Creatinine Ratio 6.8     6.8  6.9  7.8    Anion Gap 25.4     25.4  19.0  13.9      CBC          10/31/2024    02:50 11/1/2024    03:19 11/2/2024    02:20   CBC   WBC 16.23  14.52  13.53    RBC 3.51  3.58  3.53    Hemoglobin 10.3  10.6  10.6    Hematocrit 34.3  33.2  32.5    MCV 97.7  92.7  92.1    MCH 29.3  29.6  30.0    MCHC 30.0  31.9  32.6    RDW 17.1  16.7  16.8    Platelets 309  298  254    Glucose well-controlled.  Phosphorus elevated.  Magnesium within normal limits.  [x]  Microbiology: Blood culture (10/29/2024): No growth to date.  Sputum culture (10/29/2024): Klebsiella  []  Radiology:   [x]  EKG/Telemetry:    []  Cardiology/Vascular:    []  Pathology:  []  Old records:  []  Other:    Assessment & Plan   Assessment / Plan     Assessment:  Community-acquired pneumonia due to Klebsiella  Septic shock secondary to above  Acute on chronic hypoxic respiratory failure  Acute on chronic heart failure with preserved ejection fraction  Acute COPD exacerbation  Moderate to large pericardial  effusion  Volume overload  Elevated proBNP  ESRD on hemodialysis  Hypothyroidism  Obesity (BMI: 30.99)    Plan:  -Pulmonology, Cardiology and Nephrology consulted and following, appreciate assistance and recommendations in the care of this patient.  -Continue Levophed to maintain MAP greater 65, wean as tolerated  -Continue tube feeds  -Increase midodrine dose  -Continue cefepime  -Continue Brovana, Pulmicort and DuoNebs  -NIPPV therapy as tolerated  -Continue supplemental O2 to maintain sats greater than 90%, wean as tolerated  -Management discussed with nephrology.  Will attempt some removal of fluid today with dialysis  -Prognosis: Guarded  -Will monitor electrolytes and renal function with BMP, phosphorus level and magnesium level in the AM  -Will monitor WBC and Hgb with CBC in the AM  -Clinical course will dictate further management     DVT Prophylaxis: Heparin  Diet:   Diet Order   Procedures    NPO Diet NPO Type: Strict NPO     Dispo: Remain in ICU     Pt is critically ill due to septic shock, community-acquired pneumonia due to unknown infectious organism, acute on chronic complex respiratory failure, acute on chronic heart failure reduced ejection fraction, sick acute COPD exacerbation, ESRD on hemodialysis.  Critical care time spent in direct patient care: 36 minutes.  This included high complexity decision making to assess, manipulate and support vital organ system failure in this individual who has impairment of one or more vital organ systems such that there is a high probability of imminent or life threatening deterioration in the patient's condition.  Patient's management was discussed with the patient, her nurse at bedside and the following consultants: Pulmonology, Cardiology and Nephrology.     Part of this note may be an electronic transcription/translation of spoken language to printed text using the Dragon dictation system.    VTE Prophylaxis:  Pharmacologic VTE prophylaxis orders are  present.      CODE STATUS:   Medical Intervention Limits: No intubation (DNI)  Code Status (Patient has no pulse and is not breathing): No CPR (Do Not Attempt to Resuscitate)  Medical Interventions (Patient has pulse or is breathing): Limited Support      Electronically signed by Dillon Langford MD, 11/2/2024, 12:07 EDT.

## 2024-11-02 NOTE — PLAN OF CARE
Goal Outcome Evaluation:  Plan of Care Reviewed With: patient        Progress: improving  Outcome Evaluation: Patient has been wearing BiPAP all evening, currently on settings 12/5 and 30% FiO2. Patient appears to tolerate device well and is currently resting comfortably With SpO2 of 96%.

## 2024-11-02 NOTE — PROGRESS NOTES
RT EQUIPMENT DEVICE RELATED - SKIN ASSESSMENT    RT Medical Equipment/Device:     NIV Mask:  Full-face    size: L    Skin Assessment:      Cheek:  Intact  Chin:  Intact  Nose:  Intact  Mouth:  Intact    Device Skin Pressure Protection:  Pressure points protected    Nurse Notification:  Ernestina Barnes, CRT

## 2024-11-02 NOTE — PLAN OF CARE
Goal Outcome Evaluation:  Plan of Care Reviewed With: patient        Progress: no change  Outcome Evaluation: Patient wore bipap throughout the night and into this morning. Took patient off bipap and placed on 6L, tolerating well. Will continue to wean O2 per sats. Tolerated breathing treatment and percussion therapy this morning.

## 2024-11-02 NOTE — PROGRESS NOTES
Pulmonary / Critical Care Progress Note      Patient Name: Pedro Tilley  : 1945  MRN: [id number]  Attending:  Dillon Langford MD   Date of admission: 10/28/2024    Subjective   Subjective   Patient critically ill with shock    Over past 24 hours:  Tolerating BiPAP 12/5  Mentation slightly improved  Wife at bedside  Levo at 0.12  Tolerating tube feed via core track  Will be getting HD today    Review of Systems  Unable to obtain      Objective   Objective     Vitals:   Vital signs for last 24 hours:  Temp:  [90.1 °F (32.3 °C)-100.2 °F (37.9 °C)] 99.7 °F (37.6 °C)  Heart Rate:  [75-82] 77  Resp:  [16-30] 26  BP: ()/() 120/38    Intake/Output last 3 shifts:  I/O last 3 completed shifts:  In: 1988 [I.V.:1336; NG/GT:652]  Out: 0   Intake/Output this shift:  I/O this shift:  In: -   Out: 1000     Vent settings for last 24 hours:       Hemodynamic parameters for last 24 hours:       Physical Exam   Vital Signs Reviewed   General: Elderly male, lying in bed, lethargic and frail appearing  HEENT:  PERRL, EOMI.  OP  Chest:  Clear to auscultation bilaterally, no work of breathing noted on BiPAP  CV: RRR, no MGR, pulses 2+, equal.  Abd:  Soft, NT, ND, + BS, overweight  EXT:  no clubbing, no cyanosis, no edema  Neuro:  A&Ox1-2, slightly more awake today, CN grossly intact, no focal deficits.  Skin: No rashes or lesions noted    Result Review    Result Review:  I have personally reviewed the results from the time of this admission to 2024 14:59 EDT and agree with these findings:  []  Laboratory  []  Microbiology  []  Radiology  []  EKG/Telemetry   []  Cardiology/Vascular   []  Pathology  []  Old records  []  Other:  Most notable findings include:   -    Assessment & Plan   Assessment / Plan     Active Hospital Problems:  Active Hospital Problems    Diagnosis     **Septic shock     Acute on chronic respiratory failure     CAD (coronary artery disease)     DM2 (diabetes mellitus, type 2)     COPD with acute  exacerbation     ESRD (end stage renal disease)          Impression:  Septic shock  Acute hypoxic respiratory failure  Metabolic acidosis  Multifocal pneumonia  Klebsiella erogenous positive  Uremia  Volume overload  Pericardial effusion without tamponade physiology  Elevated proBNP  Community-acquired pneumonia, unspecified organism  Lactic acidosis  ESRD on HD     Plan:  -Tolerated BiPAP overnight.  ABG this morning improved  -Can transition to nasal cannula during the day and while awake  -Repeat chest x-ray 10/31 largely unchanged.  -Pressors: Continue norepinephrine to keep MAP greater than 65  -Continue midodrine 10 mg 3 times daily  -Cont aspiration precautions. Keep HOB 30 deg.   -Continue cefepime for Klebsiella pneumonia total of 7 days  -Continue with airway clearance  -Continue nebs and bronchopulmonary hygiene  -Random cortisol 39.  TSH 2.3.  Ammonia 20.  -2D echo with EF greater than 55% with diastolic dysfunction.  There is a large pericardial effusion without tamponade physiology.  -Cardiology aware, appreciate assistance  -ESRD on HD per nephrology.  Patient has a right subclavian TDC.  HD per them.  -Replace electrolytes PRN to keep K 4.0, Mag 2.0, Phos 4.0.  -Keep glucose 140-180 while in ICU. Cont SSI.  -Transfuse to keep Hgb >7  -Continue home levothyroxine  -DVT ppx: Subcutaneous heparin  -GI ppx: Continue home PPI  -Lines: Peripheral IV, right subclavian TDC  -PT/OT as tolerated  -Recommend palliative consult    VTE Prophylaxis:  Pharmacologic VTE prophylaxis orders are present.      CODE STATUS:   Medical Intervention Limits: No intubation (DNI)  Code Status (Patient has no pulse and is not breathing): No CPR (Do Not Attempt to Resuscitate)  Medical Interventions (Patient has pulse or is breathing): Limited Support    Patient is critically ill in the ICU with septic shock, acute hypoxic respiratory failure, encephalopathy, Klebsiella pneumonia in the setting of ESRD on HD. 33 minutes of  critical care time was spent managing this patient, excluding procedures. I, Dr. Maya Bernal, spent 33  minutes of critical care time. This included personally reviewing all pertinent labs, imaging, microbiology and documentation. Also discussing the case with the patient and any available family, the admitting physician and any available ancillary staff. Electronically signed by Maya Bernal MD, 11/02/24, 3:01 PM EDT.

## 2024-11-02 NOTE — NURSING NOTE
Pt completed 3.5 hour dialysis session. 84 BLP and 1L removed.    Pt tolerated treatment without difficulty. Pre treatment patient was overall unresponsive except to pain. Post dialysis treatment pt is able to follow commands of squeezing this RN hand and answering basic questions of name and . Pt stated to his daughter that he loved her and smiled and talked to her. Mentation improved.    Pt BP tolerated fluid removal with assistance of levophed.    Pt lungs sounded wet on exam and pt was suctioned by RT during treatment.     Post tx /96    Report given to [facility]. All questions addressed at this time.

## 2024-11-03 NOTE — THERAPY EVALUATION
RT EQUIPMENT DEVICE RELATED - SKIN ASSESSMENT    RT Medical Equipment/Device:     NIV Mask:  Under-the-nose   size:       Skin Assessment:      Head/neck/face:  Intact    Device Skin Pressure Protection:  Pressure points protected    Nurse Notification:  Ernestina Gray, RRT

## 2024-11-03 NOTE — PLAN OF CARE
Problem: Adult Inpatient Plan of Care  Goal: Plan of Care Review  Outcome: Progressing  Flowsheets (Taken 11/3/2024 1751)  Progress: improving  Outcome Evaluation: tube feeds maintained, tolerating 2L NC, pt agreeable to bipap today while sleeping  Plan of Care Reviewed With:   patient   spouse     Problem: Adult Inpatient Plan of Care  Goal: Absence of Hospital-Acquired Illness or Injury  Outcome: Progressing  Intervention: Identify and Manage Fall Risk  Recent Flowsheet Documentation  Taken 11/3/2024 1635 by Nevaeh Wise RN  Safety Promotion/Fall Prevention:   clutter free environment maintained   room organization consistent   safety round/check completed  Taken 11/3/2024 1415 by Nevaeh Wise RN  Safety Promotion/Fall Prevention:   clutter free environment maintained   room organization consistent   safety round/check completed  Taken 11/3/2024 1130 by Nevaeh Wise RN  Safety Promotion/Fall Prevention:   clutter free environment maintained   room organization consistent   safety round/check completed  Taken 11/3/2024 1000 by Nevaeh Wise RN  Safety Promotion/Fall Prevention:   room organization consistent   safety round/check completed  Taken 11/3/2024 0900 by Nevaeh Wise RN  Safety Promotion/Fall Prevention:   room organization consistent   safety round/check completed  Intervention: Prevent Skin Injury  Recent Flowsheet Documentation  Taken 11/3/2024 1635 by Nevaeh Wise RN  Body Position:   turned   left  Skin Protection: incontinence pads utilized  Taken 11/3/2024 1415 by Nevaeh Wise RN  Body Position:   turned   right  Taken 11/3/2024 1130 by Nevaeh Wise RN  Body Position:   left   heels elevated  Taken 11/3/2024 1000 by Nevaeh Wise RN  Body Position:   turned   left  Taken 11/3/2024 0900 by Nevaeh Wise RN  Body Position: legs elevated     Problem: Adult Inpatient Plan of Care  Goal: Optimal Comfort and Wellbeing  Outcome: Progressing  Intervention:  Provide Person-Centered Care  Recent Flowsheet Documentation  Taken 11/3/2024 1635 by Nevaeh Wise RN  Trust Relationship/Rapport:   care explained   choices provided  Taken 11/3/2024 1213 by Nevaeh Wise RN  Trust Relationship/Rapport:   care explained   choices provided  Taken 11/3/2024 0900 by Nevaeh Wise RN  Trust Relationship/Rapport:   care explained   choices provided     Problem: Skin Injury Risk Increased  Goal: Skin Health and Integrity  Outcome: Progressing  Intervention: Optimize Skin Protection  Recent Flowsheet Documentation  Taken 11/3/2024 1635 by Nevaeh Wise RN  Activity Management: bedrest  Pressure Reduction Techniques:   heels elevated off bed   weight shift assistance provided  Head of Bed (HOB) Positioning: HOB elevated  Pressure Reduction Devices: positioning supports utilized  Skin Protection: incontinence pads utilized  Taken 11/3/2024 1415 by Nevaeh Wise RN  Activity Management: bedrest  Head of Bed (HOB) Positioning: HOB elevated  Taken 11/3/2024 1130 by Nevaeh Wise RN  Activity Management: bedrest  Head of Bed (HOB) Positioning: HOB elevated  Taken 11/3/2024 1000 by Nevaeh Wise RN  Activity Management: bedrest  Head of Bed (HOB) Positioning: HOB elevated  Taken 11/3/2024 0900 by Nevaeh Wise RN  Activity Management: bedrest  Pressure Reduction Techniques:   pressure points protected   weight shift assistance provided  Head of Bed (HOB) Positioning: HOB elevated     Problem: Comorbidity Management  Goal: Maintenance of COPD Symptom Control  Outcome: Progressing  Intervention: Maintain COPD (Chronic Obstructive Pulmonary Disease) Symptom Control  Recent Flowsheet Documentation  Taken 11/3/2024 1635 by Nevaeh Wise RN  Medication Review/Management: medications reviewed  Taken 11/3/2024 1415 by Nevaeh Wise RN  Medication Review/Management: medications reviewed  Taken 11/3/2024 1130 by Nevaeh Wise RN  Medication Review/Management:  medications reviewed  Taken 11/3/2024 1000 by Nevaeh Wise RN  Medication Review/Management: medications reviewed  Taken 11/3/2024 0900 by Nevaeh Wise RN  Medication Review/Management: medications reviewed     Problem: Comorbidity Management  Goal: Blood Glucose Level Within Target Range  Outcome: Progressing  Intervention: Monitor and Manage Glycemia  Recent Flowsheet Documentation  Taken 11/3/2024 1635 by Nevaeh Wise RN  Medication Review/Management: medications reviewed  Taken 11/3/2024 1415 by Nevaeh Wise RN  Medication Review/Management: medications reviewed  Taken 11/3/2024 1130 by Nevaeh Wise RN  Medication Review/Management: medications reviewed  Taken 11/3/2024 1000 by Nevaeh Wise RN  Medication Review/Management: medications reviewed  Taken 11/3/2024 0900 by Nevaeh Wise RN  Medication Review/Management: medications reviewed     Problem: Comorbidity Management  Goal: Blood Pressure in Desired Range  Outcome: Progressing  Intervention: Maintain Blood Pressure Management  Recent Flowsheet Documentation  Taken 11/3/2024 1635 by Nevaeh Wise RN  Medication Review/Management: medications reviewed  Taken 11/3/2024 1415 by Nevaeh Wise RN  Medication Review/Management: medications reviewed  Taken 11/3/2024 1130 by Nevaeh Wise RN  Medication Review/Management: medications reviewed  Taken 11/3/2024 1000 by Nevaeh Wise RN  Medication Review/Management: medications reviewed  Taken 11/3/2024 0900 by Nevaeh Wise RN  Medication Review/Management: medications reviewed     Problem: Fall Injury Risk  Goal: Absence of Fall and Fall-Related Injury  Outcome: Progressing  Intervention: Identify and Manage Contributors  Recent Flowsheet Documentation  Taken 11/3/2024 1635 by Nevaeh Wise RN  Medication Review/Management: medications reviewed  Taken 11/3/2024 1415 by Nevaeh Wise RN  Medication Review/Management: medications reviewed  Taken 11/3/2024  1130 by Nevaeh Wise RN  Medication Review/Management: medications reviewed  Taken 11/3/2024 1000 by Nevaeh Wise RN  Medication Review/Management: medications reviewed  Taken 11/3/2024 0900 by Nevaeh Wise RN  Medication Review/Management: medications reviewed  Intervention: Promote Injury-Free Environment  Recent Flowsheet Documentation  Taken 11/3/2024 1635 by Nevaeh Wise RN  Safety Promotion/Fall Prevention:   clutter free environment maintained   room organization consistent   safety round/check completed  Taken 11/3/2024 1415 by Nevaeh Wise RN  Safety Promotion/Fall Prevention:   clutter free environment maintained   room organization consistent   safety round/check completed  Taken 11/3/2024 1130 by Nevaeh Wise RN  Safety Promotion/Fall Prevention:   clutter free environment maintained   room organization consistent   safety round/check completed  Taken 11/3/2024 1000 by Nevaeh Wise RN  Safety Promotion/Fall Prevention:   room organization consistent   safety round/check completed  Taken 11/3/2024 0900 by Nevaeh Wise RN  Safety Promotion/Fall Prevention:   room organization consistent   safety round/check completed     Problem: Sepsis/Septic Shock  Goal: Optimal Coping  Outcome: Progressing  Intervention: Support Patient and Family Response  Recent Flowsheet Documentation  Taken 11/3/2024 1635 by Nevaeh Wise RN  Family/Support System Care:   involvement promoted   support provided  Taken 11/3/2024 1213 by Nevaeh Wise RN  Family/Support System Care:   involvement promoted   support provided  Taken 11/3/2024 0900 by Nevaeh Wise RN  Family/Support System Care:   involvement promoted   support provided   Goal Outcome Evaluation:  Plan of Care Reviewed With: patient, spouse        Progress: improving  Outcome Evaluation: tube feeds maintained, tolerating 2L NC, pt agreeable to bipap today while sleeping

## 2024-11-03 NOTE — PROGRESS NOTES
[facility]     Nephrology Progress Note      Patient Name: Pedro Tilley  : 1945  MRN: [id number]  Primary Care Physician:  Yessica Sr, WENDY  Date of admission: 10/28/2024    Subjective   Subjective     Interval History:   In bed, in ICU, low blood pressure.  On Levophed.  No nausea or vomiting.  Being treated for pneumonia and congestive heart failure.  On CPAP during sleep.    Review of Systems   All systems were reviewed and negative except for: Was mentioned above.    Objective   Objective     Vitals:   Temp:  [97 °F (36.1 °C)-101.1 °F (38.4 °C)] 99.1 °F (37.3 °C)  Heart Rate:  [75-79] 75  Resp:  [15-31] 21  BP: ()/(38-96) 112/52  Flow (L/min) (Oxygen Therapy):  [2-6] 2  Physical Exam:   Constitutional: Awake, somnolent   Eyes: sclerae anicteric, no conjunctival injection   HENT: mucous membranes moist   Neck: Supple, no thyromegaly, no lymphadenopathy, trachea midline, No JVD, right IJ TDC.   Respiratory: Decreased to auscultation bilaterally, nonlabored respirations    Cardiovascular: RRR, no murmurs, rubs, or gallops.   Gastrointestinal: Positive bowel sounds, soft, nontender, nondistended   Musculoskeletal: No edema, no clubbing or cyanosis, right upper extremity AV fistula, patent.   Psychiatric: Appropriate affect, cooperative   Neurologic: Oriented x 3, moving all extremities, Cranial Nerves grossly intact, speech clear   Skin: warm and dry, no rashes     Result Review    Result Reviewed:  I have personally reviewed the results from the time of this admission to 11/3/2024 11:27 EST and agree with these findings:  [x]  Laboratory  []  Microbiology  [x]  Radiology  []  EKG/Telemetry   []  Cardiology/Vascular   []  Pathology  []  Old records  []  Other:        Lab 24  0339 24  0220 24  0319 10/31/24  0250 10/30/24  0308 10/29/24  1357 10/29/24  0226 10/28/24  2049   SODIUM 131* 129* 136 133*  133* 135* 138 133* 131*   POTASSIUM 3.8 3.8 4.1 5.6*  5.6* 4.5 4.6  4.5 4.2   CHLORIDE 94* 93* 97* 96*  96* 96* 99 93* 91*   CO2 22.7 22.1 20.0* 11.6*  11.6* 19.5* 20.1* 20.2* 20.8*   BUN 39* 51* 36* 53*  53* 36* 51* 61* 59*   CREATININE 4.71* 6.52* 5.25* 7.76*  7.76* 6.53* 8.70* 10.09* 9.74*   GLUCOSE 267* 214* 146* 178*  178* 125* 184* 197* 171*   EGFR 12.1* 8.2* 10.7* 6.7*  6.7* 8.2* 5.8* 4.9* 5.1*   ANION GAP 14.3 13.9 19.0* 25.4*  25.4* 19.5* 18.9* 19.8* 19.2*   MAGNESIUM 1.9 2.0 2.0 2.2 1.9  --  2.2 2.0   PHOSPHORUS 3.1 5.0* 4.9* 8.2*  8.2* 6.3*  --  8.2* 8.2*           Assessment & Plan   Assessment / Plan       Active Hospital Problems:  Active Hospital Problems    Diagnosis     **Septic shock     Acute on chronic respiratory failure     CAD (coronary artery disease)     DM2 (diabetes mellitus, type 2)     COPD with acute exacerbation     ESRD (end stage renal disease)        Assessment and Plan:    ESRD-usual dialysis m/w/f at [facility] in [place], right IJ TDC access.  RUE AVF is not ready yet.  Dialysis today.  D/w dialysis nursing, pulm would like to try volume removal.  Unlikely to tolerate much being on levophed.   D/w wife today, likely to move to more comfort measures soon which I think is appropriate.  Will not plan on dialysis again unless this changes.      PNA-treatment per pulmonary.      Sepsis-  cultures so far negative.  On levophed now.  Cortisol was normal.       CHF-  volume overload but with hypotension limiting UF on dialysis.  2D echo showed normal EF, moderate to large pericardial effusion,  per cardiology.      Anemia: Secondary to CKD, hemoglobin at goal.

## 2024-11-03 NOTE — PROGRESS NOTES
[facility]   Hospitalist Progress Note  Date: 11/3/2024  Patient Name: Pedro Tilley  : 1945  MRN: [id number]  Date of admission: 10/28/2024  Consultants:   -Pulmonology: Dr. Maya Bernal  -Nephrology: Dr. Nain Wetzel  -Cardiology: Dr. Fausto Joseph    Subjective   Subjective     Chief Complaint: Low blood pressure at dialysis    Summary:   Pedro Tilley is a 78 y.o. male with ESRD on hemodialysis, chronic systolic heart failure that presented to outside facility for dialysis was found to be hypotensive so transferred to [facility] for further evaluation and management.  Initially there was some concern for cardiogenic shock at outlying facility and patient was started on dobutamine, lactic acid was found to be normal and limited echo was obtained that showed moderate pericardial effusion without tamponade.  Cardiology [facility] contacted since lactic acid normal but there was low concern for cardiogenic shock and patient switched due to Levophed.  Pulmonology and nephrology services also consulted to assist in care of the patient.  Broad-spectrum antibiotics started for community-acquired pneumonia.  Echocardiogram was obtained and showed preserved LV systolic function and moderate to large pericardial effusion, fibrinous material visualized in pericardial cavity.  Cardiology noted pericardial effusion likely related to ESRD and pericardiocentesis not recommended at this time.  Antibiotic was changed from cefepime to Levaquin to see if this improve patient's mentation.    Interval Followup:   Patient still lethargic on exam but did open eyes.  Tmax: 101.1 °F over the last 24 hours (9531-0682).  Continues on Levophed.  Patient on supplemental O2.    Antibiotics:   -Cefepime    Objective   Objective     Vitals:   Temp:  [97 °F (36.1 °C)-101.1 °F (38.4 °C)] 99.7 °F (37.6 °C)  Heart Rate:  [75-78] 75  Resp:  [15-31] 25  BP: ()/(38-91) 121/51  Flow (L/min) (Oxygen Therapy):  [2-4]  2  Physical Exam   Gen: Acutely ill-appearing chronically ill male, sitting up in bed, supplemental O2 present, awake  Resp: Bilateral rhonchi, normal respiratory effort  Card: Normal rate, regular rhythm  Abd: Soft, Nontender, Nondistended, + bowel sounds    Result Review    Result Review:  I have personally reviewed the results as below and agree with these findings:  []  Laboratory:   CMP          2024    03:19 2024    02:20 11/3/2024    03:39   CMP   Glucose 146  214  267    BUN 36  51  39    Creatinine 5.25  6.52  4.71    EGFR 10.7  8.2  12.1    Sodium 136  129  131    Potassium 4.1  3.8  3.8    Chloride 97  93  94    Calcium 9.0  8.8  8.7    Total Protein 6.6  6.3  7.1    Albumin 2.7  2.6  2.8    Globulin 3.9  3.7  4.3    Total Bilirubin 1.8  1.4  1.7    Alkaline Phosphatase 68  87  197    AST (SGOT) 25  28  28    ALT (SGPT) 11  12  15    Albumin/Globulin Ratio 0.7  0.7  0.7    BUN/Creatinine Ratio 6.9  7.8  8.3    Anion Gap 19.0  13.9  14.3      CBC          2024    03:19 2024    02:20 11/3/2024    03:39   CBC   WBC 14.52  13.53  18.44    RBC 3.58  3.53  4.15    Hemoglobin 10.6  10.6  12.4    Hematocrit 33.2  32.5  39.8    MCV 92.7  92.1  95.9    MCH 29.6  30.0  29.9    MCHC 31.9  32.6  31.2    RDW 16.7  16.8  17.8    Platelets 298  254  235    AB.320/50.6/80.6. Magnesium and phosphorus within normal limits.  [x]  Microbiology: Blood culture (10/29/2024): No growth to date.  []  Radiology:   [x]  EKG/Telemetry:    []  Cardiology/Vascular:    []  Pathology:  []  Old records:  []  Other:    Assessment & Plan   Assessment / Plan     Assessment:  Community-acquired pneumonia due to Klebsiella  Septic shock secondary to above  Acute on chronic hypoxic respiratory failure  Acute on chronic heart failure with preserved ejection fraction  Acute COPD exacerbation  Moderate to large pericardial effusion  Volume overload  Elevated proBNP  ESRD on hemodialysis  Hypothyroidism  Obesity (BMI:  30.99)    Plan:  -Pulmonology, Cardiology and Nephrology consulted and following, appreciate assistance and recommendations in the care of this patient.  -Continue Levophed to maintain MAP greater 65, wean as tolerated  -Continue tube feeds  -Continue midodrine  -Cefepime discontinued.  Patient started on Levaquin  -Continue Brovana, Pulmicort and DuoNebs  -NIPPV therapy as tolerated  -Continue supplemental O2 to maintain sats greater than 90%, wean as tolerated  -Care discussed with nephrology.  Will attempt to remove some volume again today.  -Prognosis: Guarded  -Monitor electrolytes and renal function with BMP, phosphorus level and magnesium level in the AM  -Monitor WBC and Hgb with CBC in the AM  -Clinical course will dictate further management     DVT Prophylaxis: Heparin  Diet:   Diet Order   Procedures    NPO Diet NPO Type: Strict NPO     Dispo: Remain in ICU     Pt is critically ill due to septic shock, community-acquired pneumonia due to unknown infectious organism, acute on chronic complex respiratory failure, acute on chronic heart failure reduced ejection fraction, sick acute COPD exacerbation, ESRD on hemodialysis.  Critical care time spent in direct patient care: 37 minutes.  This included high complexity decision making to assess, manipulate and support vital organ system failure in this individual who has impairment of one or more vital organ systems such that there is a high probability of imminent or life threatening deterioration in the patient's condition.  Patient's management was discussed with the patient, her nurse at bedside and the following consultants: Pulmonology, Cardiology and Nephrology.     Part of this note may be an electronic transcription/translation of spoken language to printed text using the Dragon dictation system.    VTE Prophylaxis:  Pharmacologic VTE prophylaxis orders are present.      CODE STATUS:   Medical Intervention Limits: No intubation (DNI)  Code Status (Patient  has no pulse and is not breathing): No CPR (Do Not Attempt to Resuscitate)  Medical Interventions (Patient has pulse or is breathing): Limited Support      Electronically signed by Dillon Langford MD, 11/3/2024, 13:31 EST.

## 2024-11-03 NOTE — PROGRESS NOTES
Pulmonary / Critical Care Progress Note      Patient Name: Pedro Tilley  : 1945  MRN: [id number]  Attending:  Dillon Langford MD   Date of admission: 10/28/2024    Subjective   Subjective   Patient critically ill with shock    Over past 24 hours:  On 2 L nasal cannula  Resting in bed  Mentation slowly improving  Patient knows his name and date of birth  Underwent dialysis with -1 L removed  Tolerating tube feeds  Remains on Levophed  Remains on Levaquin    Objective   Objective     Vitals:   Vital signs for last 24 hours:  Temp:  [97 °F (36.1 °C)-101.1 °F (38.4 °C)] 97.4 °F (36.3 °C)  Heart Rate:  [75-79] 75  Resp:  [15-31] 25  BP: ()/(36-96) 118/53    Intake/Output last 3 shifts:  I/O last 3 completed shifts:  In: 3960 [I.V.:840; Other:1151; NG/GT:1969]  Out: 1000   Intake/Output this shift:  No intake/output data recorded.    Vent settings for last 24 hours:       Hemodynamic parameters for last 24 hours:       Physical Exam   Vital Signs Reviewed   General: Elderly male, lying in bed, lethargic and frail appearing  HEENT:  PERRL, EOMI.  OP  Chest:  Clear to auscultation bilaterally, no work of breathing noted on 2 L nasal cannula at rest  CV: RRR, no MGR, pulses 2+, equal.  Abd:  Soft, NT, ND, + BS, overweight  EXT:  no clubbing, no cyanosis, no edema  Neuro:  A&Ox1-2, slightly more awake today, CN grossly intact, no focal deficits.  Skin: No rashes or lesions noted    Result Review    Result Review:  I have personally reviewed the results from the time of this admission to 11/3/2024 07:19 EST and agree with these findings:  []  Laboratory  []  Microbiology  []  Radiology  []  EKG/Telemetry   []  Cardiology/Vascular   []  Pathology  []  Old records  []  Other:  Most notable findings include:   -    Assessment & Plan   Assessment / Plan     Active Hospital Problems:  Active Hospital Problems    Diagnosis     **Septic shock     Acute on chronic respiratory failure     CAD (coronary artery disease)      DM2 (diabetes mellitus, type 2)     COPD with acute exacerbation     ESRD (end stage renal disease)          Impression:  Septic shock  Acute hypoxic respiratory failure  Metabolic acidosis  Multifocal pneumonia  Klebsiella erogenous positive  Uremia  Volume overload  Pericardial effusion without tamponade physiology  Elevated proBNP  Community-acquired pneumonia, unspecified organism  Lactic acidosis  ESRD on HD     Plan:  -Tolerated BiPAP overnight.  ABG this morning improved  -Can transition to nasal cannula during the day and while awake  -Repeat chest x-ray 10/31 largely unchanged.  -Pressors: Continue norepinephrine to keep MAP greater than 65  -Continue midodrine 10 mg 3 times daily  -Cont aspiration precautions. Keep HOB 30 deg.   -Discontinue cefepime and transition to Levaquin for Klebsiella pneumonia total of 7 days  -Continue with airway clearance  -Continue nebs and bronchopulmonary hygiene  -Random cortisol 39.  TSH 2.3.  Ammonia 20.  -2D echo with EF greater than 55% with diastolic dysfunction.  There is a large pericardial effusion without tamponade physiology.  -Cardiology aware, appreciate assistance  -ESRD on HD per nephrology.  Patient has a right subclavian TDC.  HD per them.  -Replace electrolytes PRN to keep K 4.0, Mag 2.0, Phos 4.0.  -Keep glucose 140-180 while in ICU. Cont SSI.  -Transfuse to keep Hgb >7  -Continue home levothyroxine  -DVT ppx: Subcutaneous heparin  -GI ppx: Continue home PPI  -Lines: Peripheral IV, right subclavian TDC  -PT/OT as tolerated  -Palliative care on board, appreciate input      VTE Prophylaxis:  Pharmacologic VTE prophylaxis orders are present.      CODE STATUS:   Medical Intervention Limits: No intubation (DNI)  Code Status (Patient has no pulse and is not breathing): No CPR (Do Not Attempt to Resuscitate)  Medical Interventions (Patient has pulse or is breathing): Limited Support    Julio Cesar MARINO, APRN, spent 10 minutes critical care time in accordance  to split shared billing.  Electronically signed by WENDY Vaughan, 11/03/24, 2:51 PM EST.    Patient is critically ill with septic shock, encephalopathy, Klebsiella pneumonia in the setting of ESRD on HD. I, Dr. Maya Bernal, spent 21 minutes of critical care time. Total Critical Care time spent: 31 minutes. This included personally reviewing all pertinent labs, imaging, microbiology and documentation. Also discussing the case with the patient and any available family, the admitting physician and any available ancillary staff.   Electronically signed by Maya Bernal MD, 11/03/24, 6:17 PM EST.

## 2024-11-03 NOTE — PLAN OF CARE
Goal Outcome Evaluation:            Patient off bipap this morning on a 2lpm nasal cannula. Patient continues to have upper airway congestion, requiring frequent nt suctioning. Secretions are creamy, thick and frothy. Patient in no distress and no increase in work of breathing is noted. Patient needs encouragement to deep breathe and cough. Chest percussion with mechanical percussor, is being performed, scheduled.

## 2024-11-03 NOTE — PLAN OF CARE
Goal Outcome Evaluation:   PT WORE BIPAP 12/5 RR 14 ABOUT 20 MINUTES TONIGHT AND THEN RIPPED IT OFF. PT WAS PLACED BACK ON 3 LITER NASAL CANNULA AT THIS TIME. HAVE NT SUCTIONED PT AND GOT A LARGE AMOUNT OF CREAMY SECRETIONS. WILL CONTINUE TO MONITOR.

## 2024-11-03 NOTE — PLAN OF CARE
Goal Outcome Evaluation:                 Refused bipap overnight, on 3L NC, levo gtt

## 2024-11-03 NOTE — PROGRESS NOTES
RT EQUIPMENT DEVICE RELATED - SKIN ASSESSMENT    RT Medical Equipment/Device:     NIV Mask:  Under-the-nose   size:  .    Skin Assessment:      Cheek:  Intact  Neck:  Intact  Nose:  Intact    Device Skin Pressure Protection:  Skin-to-device areas padded:  None Required    Nurse Notification:  Ernestina Arora, RRT

## 2024-11-04 NOTE — PLAN OF CARE
Goal Outcome Evaluation:  Plan of Care Reviewed With: patient, spouse        Progress: no change  Outcome Evaluation: patient was a transfer from ICU to 4NT this shift. patient is on comfort measures only. showed nonverbal signs of pain, medicated prn per mar. Meeting with family and [facility] nurse to take place later tonight. no new issues at this time.

## 2024-11-04 NOTE — PLAN OF CARE
Goal Outcome Evaluation:                 One levo gtt, VSS, wore bipap throughout night

## 2024-11-04 NOTE — PROGRESS NOTES
RT EQUIPMENT DEVICE RELATED - SKIN ASSESSMENT    RT Medical Equipment/Device:     NIV Mask:  Full-face    size: .    Skin Assessment:      Cheek:  Intact  Forehead:  Intact  Neck:  Intact  Nose:  Intact    Device Skin Pressure Protection:  Skin-to-device areas padded:  None Required    Nurse Notification:  Ernestina Arora RRT

## 2024-11-04 NOTE — PROGRESS NOTES
[facility]     Nephrology Progress Note      Patient Name: Pedro Tilley  : 1945  MRN: [id number]  Primary Care Physician:  Yessica Sr, WENDY  Date of admission: 10/28/2024    Subjective   Subjective     Interval History:  Seen earlier this morning, this is a late entry.    Events noted.  Patient is very lethargic.  Family is discussing with palliative regarding comfort measures only.  On O2 per nasal cannula.  On Levophed.    Review of Systems   All systems were reviewed and negative except for: Was mentioned above.    Objective   Objective     Vitals:   Temp:  [97.9 °F (36.6 °C)-100.6 °F (38.1 °C)] 99.5 °F (37.5 °C)  Heart Rate:  [74-78] 75  Resp:  [19-30] 24  BP: ()/() 78/40  Flow (L/min) (Oxygen Therapy):  [2] 2  Physical Exam:   Constitutional: Lethargic, I did not attempt to wake him up,.  Moving extremities.     Eyes: sclerae anicteric, no conjunctival injection   HENT: mucous membranes moist   Neck: Supple, no thyromegaly, no lymphadenopathy, trachea midline, No JVD, right IJ TDC.   Respiratory: Decreased to auscultation bilaterally, nonlabored respirations    Cardiovascular: RRR, no murmurs, rubs, or gallops.   Gastrointestinal: Positive bowel sounds, soft, nontender, nondistended   Musculoskeletal: No edema, no clubbing or cyanosis, right upper extremity AV fistula, patent.   Psychiatric:    Neurologic:    Skin: warm and dry, no rashes     Result Review    Result Reviewed:  I have personally reviewed the results from the time of this admission to 2024 13:59 EST and agree with these findings:  [x]  Laboratory  []  Microbiology  [x]  Radiology  []  EKG/Telemetry   []  Cardiology/Vascular   []  Pathology  []  Old records  []  Other:        Lab 24  0624 24  0339 24  0220 24  0319 10/31/24  0250 10/30/24  0308 10/29/24  1357 10/29/24  0226 10/28/24  2049   SODIUM 128* 131* 129* 136 133*  133* 135* 138 133* 131*   POTASSIUM 4.1 3.8 3.8 4.1 5.6*   5.6* 4.5 4.6 4.5 4.2   CHLORIDE 94* 94* 93* 97* 96*  96* 96* 99 93* 91*   CO2 18.4* 22.7 22.1 20.0* 11.6*  11.6* 19.5* 20.1* 20.2* 20.8*   BUN 57* 39* 51* 36* 53*  53* 36* 51* 61* 59*   CREATININE 5.91* 4.71* 6.52* 5.25* 7.76*  7.76* 6.53* 8.70* 10.09* 9.74*   GLUCOSE 329* 267* 214* 146* 178*  178* 125* 184* 197* 171*   EGFR 9.2* 12.1* 8.2* 10.7* 6.7*  6.7* 8.2* 5.8* 4.9* 5.1*   ANION GAP 15.6* 14.3 13.9 19.0* 25.4*  25.4* 19.5* 18.9* 19.8* 19.2*   MAGNESIUM 2.0 1.9 2.0 2.0 2.2 1.9  --  2.2 2.0   PHOSPHORUS 3.2 3.1 5.0* 4.9* 8.2*  8.2* 6.3*  --  8.2* 8.2*           Assessment & Plan   Assessment / Plan       Active Hospital Problems:  Active Hospital Problems    Diagnosis     **Septic shock     Acute on chronic respiratory failure     CAD (coronary artery disease)     DM2 (diabetes mellitus, type 2)     COPD with acute exacerbation     ESRD (end stage renal disease)        Assessment and Plan:    ESRD-usual dialysis m/w/f at [facility] in [place], right IJ TDC access.  RUE AVF is not ready yet.  Being considered for comfort measures only.  Dialysis not ordered.  Discussed with ICU staff.      PNA-treatment per pulmonary.      Sepsis-  cultures so far negative.  On levophed now.  Cortisol was normal.       CHF-  volume overload but with hypotension limiting UF on dialysis.  2D echo showed normal EF, moderate to large pericardial effusion,  per cardiology.      Anemia: Secondary to CKD, hemoglobin at goal.    I asked to be updated on family discussion with palliative care.  I recommend comfort measures only.  Will follow.  Please call with any questions.

## 2024-11-04 NOTE — PROGRESS NOTES
[facility]   Hospitalist Progress Note  Date: 2024  Patient Name: Pedro Tilley  : 1945  MRN: [id number]  Date of admission: 10/28/2024  Consultants:   -Pulmonology: Dr. Maya Vazquez  -Nephrology: Dr. Nain Wetzel  -Cardiology: Dr. Fausto Joseph    Subjective   Subjective     Chief Complaint: Low blood pressure at dialysis    Summary:   Pedro Tilley is a 79 y.o. male with ESRD on hemodialysis, chronic systolic heart failure that presented to outside facility for dialysis was found to be hypotensive so transferred to [facility] for further evaluation and management.  Initially there was some concern for cardiogenic shock at outlying facility and patient was started on dobutamine, lactic acid was found to be normal and limited echo was obtained that showed moderate pericardial effusion without tamponade.  Cardiology [facility] contacted since lactic acid normal but there was low concern for cardiogenic shock and patient switched due to Levophed.  Pulmonology and nephrology services also consulted to assist in care of the patient.  Broad-spectrum antibiotics started for community-acquired pneumonia.  Echocardiogram was obtained and showed preserved LV systolic function and moderate to large pericardial effusion, fibrinous material visualized in pericardial cavity.  Cardiology noted pericardial effusion likely related to ESRD and pericardiocentesis not recommended at this time.  Antibiotic was changed from cefepime to Levaquin to see if this improve patient's mentation.  Patient's overall condition did not improve despite appropriate care measures and further discussions had with patient's family decision was made to discontinue invasive care measures and pursue comfort care measures only.    Interval Followup:   Patient remains lethargic.  Tmax: 100.6 °F over the last 24 hours (1088-6843).  Continues on tube feeds.  Alert but slow to respond to questions or commands.   Wife at bedside.    Antibiotics:   -Levaquin    Objective   Objective     Vitals:   Temp:  [97.9 °F (36.6 °C)-100.6 °F (38.1 °C)] 99.5 °F (37.5 °C)  Heart Rate:  [74-78] 75  Resp:  [19-30] 24  BP: ()/() 78/40  Flow (L/min) (Oxygen Therapy):  [2] 2  Physical Exam   Gen: Acutely ill-appearing chronically ill male, awake, core track present  Resp: Rhonchi bilaterally, equal chest rise bilaterally  Card: Normal rate, regular rhythm  Abd: Soft, Nontender, Nondistended, + bowel sounds    Result Review    Result Review:  I have personally reviewed the results as below and agree with these findings:  []  Laboratory:   CMP          11/2/2024    02:20 11/3/2024    03:39 11/4/2024    06:24   CMP   Glucose 214  267  329    BUN 51  39  57    Creatinine 6.52  4.71  5.91    EGFR 8.2  12.1  9.2    Sodium 129  131  128    Potassium 3.8  3.8  4.1    Chloride 93  94  94    Calcium 8.8  8.7  8.5    Total Protein 6.3  7.1     Albumin 2.6  2.8     Globulin 3.7  4.3     Total Bilirubin 1.4  1.7     Alkaline Phosphatase 87  197     AST (SGOT) 28  28     ALT (SGPT) 12  15     Albumin/Globulin Ratio 0.7  0.7     BUN/Creatinine Ratio 7.8  8.3  9.6    Anion Gap 13.9  14.3  15.6      CBC          11/2/2024    02:20 11/3/2024    03:39 11/4/2024    06:24   CBC   WBC 13.53  18.44  18.62    RBC 3.53  4.15  3.91    Hemoglobin 10.6  12.4  11.8    Hematocrit 32.5  39.8  38.3    MCV 92.1  95.9  98.0    MCH 30.0  29.9  30.2    MCHC 32.6  31.2  30.8    RDW 16.8  17.8  18.2    Platelets 254  235  185    Hyperglycemia.  proBNP: 41,690.  Phosphorus and magnesium within normal limits.  [x]  Microbiology: Blood culture (10/29/2024): No growth to date.  []  Radiology:   [x]  EKG/Telemetry:    []  Cardiology/Vascular:    []  Pathology:  []  Old records:  []  Other:    Assessment & Plan   Assessment / Plan     Assessment:  Community-acquired pneumonia due to Klebsiella  Septic shock secondary to above  Acute on chronic hypoxic respiratory  failure  Acute on chronic heart failure with preserved ejection fraction  Acute COPD exacerbation  Moderate to large pericardial effusion  Volume overload  Elevated proBNP  ESRD on hemodialysis  Hypothyroidism  Obesity (BMI: 30.99)    Plan:  -Pulmonology, Cardiology and Nephrology consulted and following, appreciate assistance and recommendations in the care of this patient.  -After further discussions with patient's family decision made to discontinue invasive care measures and pursue comfort care measures only  -As needed Ativan and morphine added for symptom management  -Palliative care following.  Hospice referral made.  -Transfer out of ICU  -Clinical course will dictate further management     DVT Prophylaxis: Heparin  Diet:   Diet Order   Procedures    Diet: Regular/House; Fluid Consistency: Thin (IDDSI 0)     Dispo: Transfer out of ICU      Time spent personally reviewing patient's chart, labs and imaging, evaluating/examining the patient, discussing care plan with patient and nurse at bedside and discussing management with consultants (Pulmonology, Cardiology and Nephrology): 52 minutes.     Part of this note may be an electronic transcription/translation of spoken language to printed text using the Dragon dictation system.     Part of this note may be an electronic transcription/translation of spoken language to printed text using the Dragon dictation system.    VTE Prophylaxis:  Mechanical VTE prophylaxis orders are present.      CODE STATUS:   Code Status (Patient has no pulse and is not breathing): No CPR (Do Not Attempt to Resuscitate)  Medical Interventions (Patient has pulse or is breathing): Comfort Measures      Electronically signed by Dillon Langford MD, 11/4/2024, 11:25 EST.

## 2024-11-04 NOTE — PLAN OF CARE
Goal Outcome Evaluation:   PT HAS BEEN ON BIPAP 12/5 RR 14 CONTINUOUSLY MY ENTIRE SHIFT. PT IS TOLERATING WELL. NO CHANGES OR ISSUES AT THIS TIME.

## 2024-11-04 NOTE — CONSULTS
Spoke with oldest daughter about family wanting pt to be comfort measures. Youngest sister entered during discussion clarified need for [facility] consult as spouse mentioned in first meeting trying to get pt home to pass but understood if he was not stable that passing in the hospital may happen. Have sent consult just waiting for EOS. Spoke with primary RN and MD. No needs at this time. Palliative care will continue to follow and provide updates on plans with [facility] if pt is stable enough.

## 2024-11-04 NOTE — PROGRESS NOTES
Pulmonary / Critical Care Progress Note      Patient Name: Pedro Tilley  : 1945  MRN: [id number]  Attending:  Dillon Langford MD   Date of admission: 10/28/2024    Subjective   Subjective   Patient critically ill with shock    Family members at bedside  Receiving Cortrak tube feeds and 2 L of oxygen  Still having trouble weaning off pressors  Requiring NIPPV overnight  On dialysis however still remains volume overloaded  Multiple family members considering comfort care  Patient is lethargic arousable, very weak and fatigued      Objective   Objective     Vitals:   Vital signs for last 24 hours:  Temp:  [97.9 °F (36.6 °C)-100.6 °F (38.1 °C)] 99.5 °F (37.5 °C)  Heart Rate:  [74-78] 75  Resp:  [19-30] 24  BP: ()/() 78/40    Intake/Output last 3 shifts:  I/O last 3 completed shifts:  In: 3141 [I.V.:462; Other:1152; NG/GT:1527]  Out: -   Intake/Output this shift:  No intake/output data recorded.    Vent settings for last 24 hours:       Hemodynamic parameters for last 24 hours:       Physical Exam   Vital Signs Reviewed   General: Elderly male, lying in bed, lethargic and frail appearing  HEENT:  PERRL, EOMI.  OP  Chest:   Poor aeration, crackles bilaterally, no work of breathing noted on 2 L nasal cannula at rest  CV: RRR, no MGR, pulses 2+, equal.  Abd:  Soft, NT, ND, + BS, overweight  EXT:  no clubbing, no cyanosis, BLE edema  Neuro:  A&Ox2, lethargic but arousable, CN grossly intact, no focal deficits.  Skin: No rashes or lesions noted    Result Review    Result Review:  I have personally reviewed the results from the time of this admission to 2024 13:49 EST and agree with these findings:  [x]  Laboratory  [x]  Microbiology  [x]  Radiology  [x]  EKG/Telemetry   [x]  Cardiology/Vascular   []  Pathology  []  Old records  []  Other:  Most notable findings include:       Lab 24  0624 24  0339 24  0220 24  0319 10/31/24  0250 10/30/24  0308 10/29/24  1357 10/29/24  0222    WBC 18.62* 18.44* 13.53* 14.52* 16.23* 15.86*  --  14.27*   HEMOGLOBIN 11.8* 12.4* 10.6* 10.6* 10.3* 10.5*  --  11.0*   HEMATOCRIT 38.3 39.8 32.5* 33.2* 34.3* 33.8*  --  34.5*   PLATELETS 185 235 254 298 309 292  --  271   SODIUM 128* 131* 129* 136 133*  133* 135* 138 133*   POTASSIUM 4.1 3.8 3.8 4.1 5.6*  5.6* 4.5 4.6 4.5   CHLORIDE 94* 94* 93* 97* 96*  96* 96* 99 93*   CO2 18.4* 22.7 22.1 20.0* 11.6*  11.6* 19.5* 20.1* 20.2*   BUN 57* 39* 51* 36* 53*  53* 36* 51* 61*   CREATININE 5.91* 4.71* 6.52* 5.25* 7.76*  7.76* 6.53* 8.70* 10.09*   GLUCOSE 329* 267* 214* 146* 178*  178* 125* 184* 197*   CALCIUM 8.5* 8.7 8.8 9.0 9.1  9.1 8.7 8.0* 8.5*   PHOSPHORUS 3.2 3.1 5.0* 4.9* 8.2*  8.2* 6.3*  --  8.2*   TOTAL PROTEIN  --  7.1 6.3 6.6 6.9  --   --   --    ALBUMIN  --  2.8* 2.6* 2.7* 2.5*  2.5*  --  3.1*  --    GLOBULIN  --  4.3 3.7 3.9 4.4  --   --   --      NT BNP elevated  Chest x-ray with bilateral airspace disease  Respiratory culture with Klebsiella      Assessment & Plan   Assessment / Plan     Active Hospital Problems:  Active Hospital Problems    Diagnosis     **Septic shock     Acute on chronic respiratory failure     CAD (coronary artery disease)     DM2 (diabetes mellitus, type 2)     COPD with acute exacerbation     ESRD (end stage renal disease)      Impression:  Septic shock  Acute hypoxic respiratory failure  Metabolic acidosis  Klebsiella pneumonia  Pulmonary edema  Question left greater than right pleural effusions  Uremia  Volume overload  Pericardial effusion without tamponade physiology  Elevated proBNP  Lactic acidosis  ESRD on HD  Altered mental status  Toxic/metabolic encephalopathy  Obesity BMI 31.7     Plan:  Continue BiPAP nightly with naps on current settings  Wean off pressors to keep mean arterial pressure greater than 65.  Increase midodrine to 15 mg 3 times daily to facilitate this  Check noncontrast chest CT to assess for any pleural effusions amenable to draining  Agree with  changing antibiotics to Levaquin.  Complete 7 days of antibiotics in total.  Patient has woken up some after doing this and I suspect the patient had a component of cefepime induced encephalopathy  Continue aspiration precautions  Continue nebulizers and aggressive bronchopulmonary hygiene  DNR/DNI.  Family is considering comfort care  Appreciate cardiology input.  Had large pericardial effusion but no evidence of tamponade  Continue sliding scale insulin  Tube feeds to goal per dietitian recommendations  Continue Synthroid  Timing of dialysis per nephrology.  Appreciate input  Trend troponin  Appreciate palliative care assistance.  Overall prognosis is poor    VTE Prophylaxis:  Mechanical VTE prophylaxis orders are present.    CODE STATUS:   Code Status (Patient has no pulse and is not breathing): No CPR (Do Not Attempt to Resuscitate)  Medical Interventions (Patient has pulse or is breathing): Comfort Measures      The patient is critically ill in the ICU with septic shock, Klebsiella pneumonia, volume overload, decompensated heart failure, end-stage renal disease on dialysis, altered mental status. Bedside rounds were performed with nursing staff, respiratory therapy, pharmacy, nutritional services, social work. I have personally reviewed the chart, labs and any pertinent imaging available.  I have spent 34 minutes of critical care time, excluding procedures, in the care of this patient.    Addendum:  Family has decided on comfort care  Patient now comfort measures.  Transfer out of ICU to [facility] for comfort bed  Continue medications for pain, secretion management, anxiety, air hunger, delirium  Oxygen for comfort  Rest per primary     I will sign off.  Please call with questions.    Electronically signed by David Vazquez MD, 11/04/24, 1:52 PM EST.

## 2024-11-04 NOTE — NURSING NOTE
Met with patient's family at bedside this evening. EOS provided and all questions answered. Family expressed gratitude for our willingness to help but declined our services at this time as they state they feel he is very close to EOL (too close to be transported home) and they are content with his care at this time. This RN did give them EOS folder with a contact number should they change their minds. They are very satisfied with the staff here at [facility] and had wonderful things to say. Thank you for allowing us to see this patient and meet with this family this evening. Please call us with any questions or concerns.

## 2024-11-05 NOTE — SIGNIFICANT NOTE
11/04/24 1100   Coping/Psychosocial   Observed Emotional State other (see comments)  (the Pt is with Comfort Care Suppoort)   Verbalized Emotional State other (see comments)  (The Pt is dying)   Family/Support Persons daughter;family   Involvement in Care no interaction with patient   Family/Support System Care support provided   Diversional Activities smartphone;other (see comments)  (Read Bible Psalm 23)   Additional Documentation Spiritual Care (Group)   Spiritual Care   Use of Spiritual Resources prayer;spirituality for coping, indicated strong use of   Spiritual Care Source  initiative   Spiritual Care Follow-Up follow-up, none required as presently assessed   Response to Spiritual Care receptive of support;thanks expressed   Spiritual Care Interventions prayer support provided;end-of-life care assistance provided;scripture assistance provided   Spiritual Care Visit Type initial   Spiritual Care Request family support;prayer support;scripture support   Receptivity to Spiritual Care visit welcomed

## 2024-11-06 LAB
A PHAGOCYTOPH DNA BLD QL NAA+PROBE: NEGATIVE
EHRLICHIA DNA SPEC QL NAA+PROBE: NEGATIVE
RICKETTSIA RICKETTSII DNA, RT: NOT DETECTED

## 2024-11-08 LAB
BACTERIA SPEC AEROBE CULT: NORMAL
BACTERIA SPEC AEROBE CULT: NORMAL

## 2024-11-12 NOTE — DISCHARGE SUMMARY
[facility]         HOSPITALIST  DEATH SUMMARY    Patient Name: Pedro Tilley  : 1945  MRN: [id number]    Date of Admission: 10/28/2024  Time of Death:  2024 at 19:13  Primary Care Physician: Yessica Sr APRN    Consultants:  -Pulmonology: Dr. Maya Vazquez  -Nephrology: Dr. Nain Wetzel  -Cardiology: Dr. Fausto Joseph    Hospital Problems:  Community-acquired pneumonia due to Klebsiella  Septic shock secondary to above  Acute on chronic hypoxic respiratory failure  Acute on chronic heart failure with preserved ejection fraction  Acute COPD exacerbation  Moderate to large pericardial effusion  Volume overload  Elevated proBNP  ESRD on hemodialysis  Hypothyroidism  Obesity (BMI: 30.99)    Hospital Course     Hospital Course:  Pedro Tilley is a 79 y.o. male with ESRD on hemodialysis, chronic systolic heart failure that presented to outside facility for dialysis was found to be hypotensive so transferred to [facility] for further evaluation and management.  Initially there was some concern for cardiogenic shock at outlying facility and patient was started on dobutamine, lactic acid was found to be normal and limited echo was obtained that showed moderate pericardial effusion without tamponade.  Cardiology [facility] contacted since lactic acid normal but there was low concern for cardiogenic shock and patient switched due to Levophed.  Pulmonology and nephrology services also consulted to assist in care of the patient.  Broad-spectrum antibiotics started for community-acquired pneumonia.  Echocardiogram was obtained and showed preserved LV systolic function and moderate to large pericardial effusion, fibrinous material visualized in pericardial cavity.  Cardiology noted pericardial effusion likely related to ESRD and pericardiocentesis not recommended at this time.  Antibiotic was changed from cefepime to Levaquin to see if this improve  patient's mentation.  Patient's overall condition did not improve despite appropriate care measures and further discussions had with patient's family decision was made to discontinue invasive care measures and pursue comfort care measures only. Time of death: 11/04/2024 at 19:13.    Pertinent  and/or Most Recent Results     RADIOLOGY:  XR Chest 1 View [[id number]] Adalberto as Reviewed   Order Status: Completed Collected: 11/04/24 0816    Updated: 11/04/24 0819   Narrative:     XR CHEST 1 VW    Date of Exam: 11/4/2024 7:54 AM EST    Indication: possible aspiration    Comparison: 11/2/2024    Findings:  Cardiac size is markedly enlarged. There is a biventricular pacing device in place. There is a dependent moderate size left pleural effusion with atelectasis in the left lung base. There is some airspace disease at the right lung base which has  decreased. A right-sided tunneled dialysis catheter is in place with the tip in the right atrium.   Impression:     Impression:    1. Slight decrease in right basilar airspace disease compared with the last exam otherwise no significant change.      Electronically Signed: Bill Baron MD   11/4/2024 8:17 AM EST   Workstation ID: [id number]    XR Chest 1 View [[id number]] Adalberto as Reviewed   Order Status: Completed Collected: 11/02/24 1611    Updated: 11/02/24 1615   Narrative:     XR CHEST 1 VW    Date of Exam: 11/2/2024 9:15 AM EDT    Indication: follow up    Comparison: Chest radiograph 10/31/2024.    Findings:  Left chest pacemaker/defibrillator. Feeding catheter courses below the diaphragm with tip excluded from the field-of-view. Right IJ approach send venous catheter terminates over the right atrium. Enlarged cardiac silhouette, unchanged. Moderate left  pleural effusion with adjacent airspace disease, unchanged. Unchanged low-grade right basilar airspace disease. No distinct right pleural effusion. No pneumothorax. Osseous structures are unchanged.   Impression:      Impression:  No significant interval change, with persistent moderate left pleural effusion and adjacent airspace disease, as well as low-grade right basilar airspace disease.      Electronically Signed: José Miguel Marino MD   11/2/2024 4:13 PM EDT   Workstation ID: [id number]    XR Chest 1 View [[id number]] Adalberto as Reviewed   Order Status: Completed Collected: 10/31/24 0957    Updated: 10/31/24 1002   Narrative:     XR CHEST 1 VW    Date of Exam: 10/31/2024 9:35 AM EDT    Indication: HYPOXIA    Comparison: Chest radiograph 10/28/2024, chest CT 9/3/2024    Findings:  Multiple leads project over the chest. Multichamber AICD. Stable right IJ central venous catheter position terminating over the right atrium. Cardiomegaly without significant change from prior. Moderate size left effusion with adjacent retrocardiac  consolidation presumed atelectasis however infection may have a similar appearance. Mild patchy airspace opacities in the right lower lobe similar to prior, differential includes atelectasis versus infection. Similarly prominent central pulmonary  vasculature without overt edema. No pneumothorax. No significant right effusion. Degenerative elated osseous changes. Surgical clips in the upper abdomen noted. Aortic atherosclerotic disease.   Impression:     Impression:  No significant radiographic change since 10/28/2024 comparison.      Electronically Signed: Michael Paris MD   10/31/2024 10:00 AM EDT   Workstation ID: [id number]    XR Chest 1 View [[id number]] Adalberto as Reviewed   Order Status: Completed Collected: 10/28/24 2204    Updated: 10/28/24 2208   Narrative:     XR CHEST 1 VW    Date of Exam: 10/28/2024 8:50 PM EDT    Indication: CHF    Comparison: 9/2/2024    Findings:  Heart remains quite enlarged. Left-sided pacer/defibrillator is again seen. There is atherosclerotic disease in the aorta. Central venous catheter on the right has the distal tip at the level of the right atrium. There is vascular  congestion. There are  infiltrates in the mid to lower lungs that could reflect edema or pneumonia. No pneumothorax. Small left effusion may be present.   Impression:     Cardiomegaly and vascular congestion. Mid to lower lung infiltrates could reflect pneumonia or pulmonary edema. Small left effusion may be present as well.      Electronically Signed: Lam Reed MD   10/28/2024 10:06 PM EDT   Workstation ID: [id number]       LAB RESULTS:                              Brief Urine Lab Results       None          Microbiology Results (last 10 days)       Procedure Component Value - Date/Time    Blood Culture - Blood, Arm, Left [[id number]]  (Normal) Collected: 11/03/24 1441    Lab Status: Final result Specimen: Blood from Arm, Left Updated: 11/08/24 1500     Blood Culture No growth at 5 days    Blood Culture - Blood, Hand, Left [[id number]]  (Normal) Collected: 11/03/24 1441    Lab Status: Final result Specimen: Blood from Hand, Left Updated: 11/08/24 1500     Blood Culture No growth at 5 days                      Results for orders placed during the hospital encounter of 10/28/24    Adult Transthoracic Echo Complete w/ Color, Spectral and Contrast if necessary per protocol    Interpretation Summary  Technically difficult study.  All structures were not well-visualized.    LV has normal size.  Moderate concentric hypertrophy is noted.  Systolic function is preserved.  No regional wall motion abnormalities are noted.  Calculated LVEF is greater than 55%.  Diastolic function cannot be assessed due to underlying rhythm.  Diastolic dysfunction however is present.  Bright echodensity visualized in right ventricle consistent with pacer/ICD wire.  Aortic valve is not well-visualized.  Mitral valve has thickened leaflets.  MAC is present.  Tricuspid and pulmonic valve are not well-visualized.  Trace TR is noted.  IVC is not well-visualized visualized.  Large pericardial effusion is noted.  However no echocardiographic or  Doppler signs are noted for hemodynamic compromise.  Compared to echocardiogram from 2/24/2020 LVH is now moderate        Electronically signed by Dillon Langford MD, 11/12/24, 5:50 PM EST.